# Patient Record
Sex: FEMALE | Race: WHITE | NOT HISPANIC OR LATINO | Employment: OTHER | URBAN - METROPOLITAN AREA
[De-identification: names, ages, dates, MRNs, and addresses within clinical notes are randomized per-mention and may not be internally consistent; named-entity substitution may affect disease eponyms.]

---

## 2020-09-01 ENCOUNTER — TRANSCRIBE ORDERS (OUTPATIENT)
Dept: ADMINISTRATIVE | Facility: HOSPITAL | Age: 75
End: 2020-09-01

## 2020-09-01 ENCOUNTER — LAB (OUTPATIENT)
Dept: LAB | Facility: HOSPITAL | Age: 75
End: 2020-09-01

## 2020-09-01 DIAGNOSIS — N18.9 ACUTE RENAL FAILURE SUPERIMPOSED ON CHRONIC KIDNEY DISEASE, UNSPECIFIED CKD STAGE, UNSPECIFIED ACUTE RENAL FAILURE TYPE (HCC): Primary | ICD-10-CM

## 2020-09-01 DIAGNOSIS — D64.9 ANEMIA, UNSPECIFIED TYPE: ICD-10-CM

## 2020-09-01 DIAGNOSIS — N17.9 ACUTE RENAL FAILURE SUPERIMPOSED ON CHRONIC KIDNEY DISEASE, UNSPECIFIED CKD STAGE, UNSPECIFIED ACUTE RENAL FAILURE TYPE (HCC): ICD-10-CM

## 2020-09-01 DIAGNOSIS — N18.9 ACUTE RENAL FAILURE SUPERIMPOSED ON CHRONIC KIDNEY DISEASE, UNSPECIFIED CKD STAGE, UNSPECIFIED ACUTE RENAL FAILURE TYPE (HCC): ICD-10-CM

## 2020-09-01 DIAGNOSIS — N17.9 ACUTE RENAL FAILURE SUPERIMPOSED ON CHRONIC KIDNEY DISEASE, UNSPECIFIED CKD STAGE, UNSPECIFIED ACUTE RENAL FAILURE TYPE (HCC): Primary | ICD-10-CM

## 2020-09-01 LAB
ALBUMIN SERPL-MCNC: 3.5 G/DL (ref 3.5–5.2)
ALBUMIN/GLOB SERPL: 1.1 G/DL
ALP SERPL-CCNC: 144 U/L (ref 39–117)
ALT SERPL W P-5'-P-CCNC: 14 U/L (ref 1–33)
ANION GAP SERPL CALCULATED.3IONS-SCNC: 11.3 MMOL/L (ref 5–15)
AST SERPL-CCNC: 14 U/L (ref 1–32)
BILIRUB SERPL-MCNC: 0.3 MG/DL (ref 0–1.2)
BUN SERPL-MCNC: 71 MG/DL (ref 8–23)
BUN/CREAT SERPL: 13.2 (ref 7–25)
CALCIUM SPEC-SCNC: 10.5 MG/DL (ref 8.6–10.5)
CHLORIDE SERPL-SCNC: 97 MMOL/L (ref 98–107)
CO2 SERPL-SCNC: 21.7 MMOL/L (ref 22–29)
CREAT SERPL-MCNC: 5.39 MG/DL (ref 0.57–1)
DEPRECATED RDW RBC AUTO: 40.8 FL (ref 37–54)
ERYTHROCYTE [DISTWIDTH] IN BLOOD BY AUTOMATED COUNT: 13.1 % (ref 12.3–15.4)
GFR SERPL CREATININE-BSD FRML MDRD: 8 ML/MIN/1.73
GFR SERPL CREATININE-BSD FRML MDRD: ABNORMAL ML/MIN/{1.73_M2}
GLOBULIN UR ELPH-MCNC: 3.2 GM/DL
GLUCOSE SERPL-MCNC: 195 MG/DL (ref 65–99)
HCT VFR BLD AUTO: 29.5 % (ref 34–46.6)
HGB BLD-MCNC: 9.8 G/DL (ref 12–15.9)
MCH RBC QN AUTO: 28.5 PG (ref 26.6–33)
MCHC RBC AUTO-ENTMCNC: 33.2 G/DL (ref 31.5–35.7)
MCV RBC AUTO: 85.8 FL (ref 79–97)
PLATELET # BLD AUTO: 266 10*3/MM3 (ref 140–450)
PMV BLD AUTO: 10.2 FL (ref 6–12)
POTASSIUM SERPL-SCNC: 4.3 MMOL/L (ref 3.5–5.2)
PROT SERPL-MCNC: 6.7 G/DL (ref 6–8.5)
RBC # BLD AUTO: 3.44 10*6/MM3 (ref 3.77–5.28)
SODIUM SERPL-SCNC: 130 MMOL/L (ref 136–145)
WBC # BLD AUTO: 8.39 10*3/MM3 (ref 3.4–10.8)

## 2020-09-01 PROCEDURE — 80053 COMPREHEN METABOLIC PANEL: CPT

## 2020-09-01 PROCEDURE — 85027 COMPLETE CBC AUTOMATED: CPT

## 2020-09-01 PROCEDURE — 36415 COLL VENOUS BLD VENIPUNCTURE: CPT

## 2023-07-04 PROBLEM — E11.628 DIABETIC FOOT INFECTION: Status: ACTIVE | Noted: 2023-07-04

## 2023-07-04 PROBLEM — I48.0 PAF (PAROXYSMAL ATRIAL FIBRILLATION): Status: ACTIVE | Noted: 2023-07-04

## 2023-07-04 PROBLEM — G62.9 PERIPHERAL NEUROPATHY: Status: ACTIVE | Noted: 2023-07-04

## 2023-07-04 PROBLEM — J44.9 COPD (CHRONIC OBSTRUCTIVE PULMONARY DISEASE): Status: ACTIVE | Noted: 2023-07-04

## 2023-07-04 PROBLEM — Z85.038 HISTORY OF COLON CANCER: Status: ACTIVE | Noted: 2023-07-04

## 2023-07-04 PROBLEM — E11.9 DM (DIABETES MELLITUS): Status: ACTIVE | Noted: 2023-07-04

## 2023-07-04 PROBLEM — I10 HTN (HYPERTENSION): Status: ACTIVE | Noted: 2023-07-04

## 2023-07-04 PROBLEM — L08.9 DIABETIC FOOT INFECTION: Status: ACTIVE | Noted: 2023-07-04

## 2023-07-04 PROBLEM — D64.9 ANEMIA: Status: ACTIVE | Noted: 2023-07-04

## 2023-07-04 PROBLEM — N18.6 ESRD (END STAGE RENAL DISEASE): Status: ACTIVE | Noted: 2023-07-04

## 2023-07-06 PROBLEM — S91.339A PENETRATING FOOT WOUND: Status: ACTIVE | Noted: 2023-07-06

## 2023-07-10 ENCOUNTER — APPOINTMENT (OUTPATIENT)
Dept: CT IMAGING | Facility: HOSPITAL | Age: 78
DRG: 417 | End: 2023-07-10
Payer: MEDICARE

## 2023-07-10 ENCOUNTER — HOSPITAL ENCOUNTER (INPATIENT)
Facility: HOSPITAL | Age: 78
LOS: 8 days | Discharge: NURSING, REHAB OR LONG-TERM CARE | DRG: 417 | End: 2023-07-18
Attending: EMERGENCY MEDICINE | Admitting: INTERNAL MEDICINE
Payer: MEDICARE

## 2023-07-10 DIAGNOSIS — Z99.2 ESRD ON DIALYSIS: ICD-10-CM

## 2023-07-10 DIAGNOSIS — K81.0 ACUTE CHOLECYSTITIS: ICD-10-CM

## 2023-07-10 DIAGNOSIS — R10.11 ABDOMINAL PAIN, ACUTE, RIGHT UPPER QUADRANT: Primary | ICD-10-CM

## 2023-07-10 DIAGNOSIS — N18.6 ESRD ON DIALYSIS: ICD-10-CM

## 2023-07-10 DIAGNOSIS — K80.63 CALCULUS OF GALLBLADDER AND BILE DUCT WITH ACUTE CHOLECYSTITIS, WITH OBSTRUCTION: ICD-10-CM

## 2023-07-10 PROBLEM — I50.32 DIASTOLIC CHF, CHRONIC: Status: ACTIVE | Noted: 2023-07-10

## 2023-07-10 LAB
ALBUMIN SERPL-MCNC: 3.6 G/DL (ref 3.5–5.2)
ALBUMIN/GLOB SERPL: 1.1 G/DL
ALP SERPL-CCNC: 679 U/L (ref 39–117)
ALT SERPL W P-5'-P-CCNC: 132 U/L (ref 1–33)
ANION GAP SERPL CALCULATED.3IONS-SCNC: 13.3 MMOL/L (ref 5–15)
AST SERPL-CCNC: 48 U/L (ref 1–32)
BASOPHILS # BLD AUTO: 0.05 10*3/MM3 (ref 0–0.2)
BASOPHILS NFR BLD AUTO: 0.8 % (ref 0–1.5)
BILIRUB SERPL-MCNC: 1.3 MG/DL (ref 0–1.2)
BUN SERPL-MCNC: 18 MG/DL (ref 8–23)
BUN/CREAT SERPL: 5 (ref 7–25)
CALCIUM SPEC-SCNC: 10 MG/DL (ref 8.6–10.5)
CHLORIDE SERPL-SCNC: 96 MMOL/L (ref 98–107)
CO2 SERPL-SCNC: 29.7 MMOL/L (ref 22–29)
CREAT SERPL-MCNC: 3.61 MG/DL (ref 0.57–1)
D-LACTATE SERPL-SCNC: 1.1 MMOL/L (ref 0.5–2)
DEPRECATED RDW RBC AUTO: 47.5 FL (ref 37–54)
EGFRCR SERPLBLD CKD-EPI 2021: 12.4 ML/MIN/1.73
EOSINOPHIL # BLD AUTO: 0.3 10*3/MM3 (ref 0–0.4)
EOSINOPHIL NFR BLD AUTO: 4.7 % (ref 0.3–6.2)
ERYTHROCYTE [DISTWIDTH] IN BLOOD BY AUTOMATED COUNT: 14.7 % (ref 12.3–15.4)
GEN 5 2HR TROPONIN T REFLEX: 227 NG/L
GLOBULIN UR ELPH-MCNC: 3.2 GM/DL
GLUCOSE BLDC GLUCOMTR-MCNC: 103 MG/DL (ref 70–130)
GLUCOSE BLDC GLUCOMTR-MCNC: 85 MG/DL (ref 70–130)
GLUCOSE SERPL-MCNC: 147 MG/DL (ref 65–99)
HCT VFR BLD AUTO: 35.3 % (ref 34–46.6)
HGB BLD-MCNC: 11.3 G/DL (ref 12–15.9)
IMM GRANULOCYTES # BLD AUTO: 0.04 10*3/MM3 (ref 0–0.05)
IMM GRANULOCYTES NFR BLD AUTO: 0.6 % (ref 0–0.5)
LIPASE SERPL-CCNC: 38 U/L (ref 13–60)
LYMPHOCYTES # BLD AUTO: 0.7 10*3/MM3 (ref 0.7–3.1)
LYMPHOCYTES NFR BLD AUTO: 10.9 % (ref 19.6–45.3)
MCH RBC QN AUTO: 28.8 PG (ref 26.6–33)
MCHC RBC AUTO-ENTMCNC: 32 G/DL (ref 31.5–35.7)
MCV RBC AUTO: 89.8 FL (ref 79–97)
MONOCYTES # BLD AUTO: 0.53 10*3/MM3 (ref 0.1–0.9)
MONOCYTES NFR BLD AUTO: 8.2 % (ref 5–12)
NEUTROPHILS NFR BLD AUTO: 4.81 10*3/MM3 (ref 1.7–7)
NEUTROPHILS NFR BLD AUTO: 74.8 % (ref 42.7–76)
NRBC BLD AUTO-RTO: 0 /100 WBC (ref 0–0.2)
PLATELET # BLD AUTO: 183 10*3/MM3 (ref 140–450)
PMV BLD AUTO: 10.2 FL (ref 6–12)
POTASSIUM SERPL-SCNC: 4.3 MMOL/L (ref 3.5–5.2)
PROT SERPL-MCNC: 6.8 G/DL (ref 6–8.5)
QT INTERVAL: 462 MS
QT INTERVAL: 476 MS
RBC # BLD AUTO: 3.93 10*6/MM3 (ref 3.77–5.28)
SODIUM SERPL-SCNC: 139 MMOL/L (ref 136–145)
TROPONIN T DELTA: 2 NG/L
TROPONIN T SERPL HS-MCNC: 209 NG/L
TROPONIN T SERPL HS-MCNC: 225 NG/L
WBC NRBC COR # BLD: 6.43 10*3/MM3 (ref 3.4–10.8)

## 2023-07-10 PROCEDURE — 93010 ELECTROCARDIOGRAM REPORT: CPT | Performed by: INTERNAL MEDICINE

## 2023-07-10 PROCEDURE — 93005 ELECTROCARDIOGRAM TRACING: CPT | Performed by: EMERGENCY MEDICINE

## 2023-07-10 PROCEDURE — 85025 COMPLETE CBC W/AUTO DIFF WBC: CPT | Performed by: EMERGENCY MEDICINE

## 2023-07-10 PROCEDURE — 80053 COMPREHEN METABOLIC PANEL: CPT | Performed by: EMERGENCY MEDICINE

## 2023-07-10 PROCEDURE — 25010000002 HYDROMORPHONE PER 4 MG: Performed by: EMERGENCY MEDICINE

## 2023-07-10 PROCEDURE — 83690 ASSAY OF LIPASE: CPT | Performed by: EMERGENCY MEDICINE

## 2023-07-10 PROCEDURE — 25010000002 ONDANSETRON PER 1 MG: Performed by: EMERGENCY MEDICINE

## 2023-07-10 PROCEDURE — 36415 COLL VENOUS BLD VENIPUNCTURE: CPT | Performed by: EMERGENCY MEDICINE

## 2023-07-10 PROCEDURE — 84484 ASSAY OF TROPONIN QUANT: CPT | Performed by: EMERGENCY MEDICINE

## 2023-07-10 PROCEDURE — 25010000002 CEFTRIAXONE PER 250 MG: Performed by: EMERGENCY MEDICINE

## 2023-07-10 PROCEDURE — 99221 1ST HOSP IP/OBS SF/LOW 40: CPT | Performed by: SURGERY

## 2023-07-10 PROCEDURE — 84484 ASSAY OF TROPONIN QUANT: CPT | Performed by: INTERNAL MEDICINE

## 2023-07-10 PROCEDURE — 99284 EMERGENCY DEPT VISIT MOD MDM: CPT

## 2023-07-10 PROCEDURE — 82948 REAGENT STRIP/BLOOD GLUCOSE: CPT

## 2023-07-10 PROCEDURE — 87040 BLOOD CULTURE FOR BACTERIA: CPT | Performed by: EMERGENCY MEDICINE

## 2023-07-10 PROCEDURE — 93005 ELECTROCARDIOGRAM TRACING: CPT | Performed by: INTERNAL MEDICINE

## 2023-07-10 PROCEDURE — 83605 ASSAY OF LACTIC ACID: CPT | Performed by: EMERGENCY MEDICINE

## 2023-07-10 PROCEDURE — 74176 CT ABD & PELVIS W/O CONTRAST: CPT

## 2023-07-10 PROCEDURE — 99222 1ST HOSP IP/OBS MODERATE 55: CPT | Performed by: NURSE PRACTITIONER

## 2023-07-10 RX ORDER — HYDROCODONE BITARTRATE AND ACETAMINOPHEN 7.5; 325 MG/1; MG/1
1 TABLET ORAL EVERY 4 HOURS PRN
Status: DISCONTINUED | OUTPATIENT
Start: 2023-07-10 | End: 2023-07-18 | Stop reason: HOSPADM

## 2023-07-10 RX ORDER — PRAVASTATIN SODIUM 40 MG
40 TABLET ORAL NIGHTLY
Status: DISCONTINUED | OUTPATIENT
Start: 2023-07-10 | End: 2023-07-11

## 2023-07-10 RX ORDER — ONDANSETRON 2 MG/ML
4 INJECTION INTRAMUSCULAR; INTRAVENOUS EVERY 6 HOURS PRN
Status: DISCONTINUED | OUTPATIENT
Start: 2023-07-10 | End: 2023-07-18 | Stop reason: HOSPADM

## 2023-07-10 RX ORDER — SODIUM CHLORIDE 0.9 % (FLUSH) 0.9 %
10 SYRINGE (ML) INJECTION EVERY 12 HOURS SCHEDULED
Status: DISCONTINUED | OUTPATIENT
Start: 2023-07-10 | End: 2023-07-18 | Stop reason: HOSPADM

## 2023-07-10 RX ORDER — ONDANSETRON 2 MG/ML
4 INJECTION INTRAMUSCULAR; INTRAVENOUS ONCE
Status: COMPLETED | OUTPATIENT
Start: 2023-07-10 | End: 2023-07-10

## 2023-07-10 RX ORDER — FERROUS SULFATE 325(65) MG
325 TABLET ORAL EVERY OTHER DAY
Status: DISCONTINUED | OUTPATIENT
Start: 2023-07-10 | End: 2023-07-18 | Stop reason: HOSPADM

## 2023-07-10 RX ORDER — NITROGLYCERIN 0.4 MG/1
0.4 TABLET SUBLINGUAL
Status: DISCONTINUED | OUTPATIENT
Start: 2023-07-10 | End: 2023-07-18 | Stop reason: HOSPADM

## 2023-07-10 RX ORDER — BUMETANIDE 1 MG/1
1 TABLET ORAL DAILY
Status: DISCONTINUED | OUTPATIENT
Start: 2023-07-10 | End: 2023-07-13

## 2023-07-10 RX ORDER — HYDROMORPHONE HYDROCHLORIDE 1 MG/ML
0.5 INJECTION, SOLUTION INTRAMUSCULAR; INTRAVENOUS; SUBCUTANEOUS ONCE
Status: COMPLETED | OUTPATIENT
Start: 2023-07-10 | End: 2023-07-10

## 2023-07-10 RX ORDER — SODIUM CHLORIDE 0.9 % (FLUSH) 0.9 %
10 SYRINGE (ML) INJECTION AS NEEDED
Status: DISCONTINUED | OUTPATIENT
Start: 2023-07-10 | End: 2023-07-18 | Stop reason: HOSPADM

## 2023-07-10 RX ORDER — ACETAMINOPHEN 325 MG/1
650 TABLET ORAL EVERY 4 HOURS PRN
Status: DISCONTINUED | OUTPATIENT
Start: 2023-07-10 | End: 2023-07-18 | Stop reason: HOSPADM

## 2023-07-10 RX ORDER — ACETAMINOPHEN 650 MG/1
650 SUPPOSITORY RECTAL EVERY 4 HOURS PRN
Status: DISCONTINUED | OUTPATIENT
Start: 2023-07-10 | End: 2023-07-18 | Stop reason: HOSPADM

## 2023-07-10 RX ORDER — ALBUTEROL SULFATE 90 UG/1
2 AEROSOL, METERED RESPIRATORY (INHALATION) EVERY 4 HOURS PRN
COMMUNITY
End: 2023-07-18 | Stop reason: HOSPADM

## 2023-07-10 RX ORDER — SODIUM CHLORIDE 9 MG/ML
40 INJECTION, SOLUTION INTRAVENOUS AS NEEDED
Status: DISCONTINUED | OUTPATIENT
Start: 2023-07-10 | End: 2023-07-18 | Stop reason: HOSPADM

## 2023-07-10 RX ORDER — SENNOSIDES A AND B 8.6 MG/1
1 TABLET, FILM COATED ORAL DAILY
Status: DISCONTINUED | OUTPATIENT
Start: 2023-07-10 | End: 2023-07-10 | Stop reason: SDUPTHER

## 2023-07-10 RX ORDER — BISACODYL 5 MG/1
5 TABLET, DELAYED RELEASE ORAL DAILY PRN
Status: DISCONTINUED | OUTPATIENT
Start: 2023-07-10 | End: 2023-07-18 | Stop reason: HOSPADM

## 2023-07-10 RX ORDER — CARVEDILOL 6.25 MG/1
6.25 TABLET ORAL 2 TIMES DAILY WITH MEALS
Status: DISCONTINUED | OUTPATIENT
Start: 2023-07-10 | End: 2023-07-12

## 2023-07-10 RX ORDER — HYDROMORPHONE HYDROCHLORIDE 1 MG/ML
0.5 INJECTION, SOLUTION INTRAMUSCULAR; INTRAVENOUS; SUBCUTANEOUS
Status: DISCONTINUED | OUTPATIENT
Start: 2023-07-10 | End: 2023-07-13

## 2023-07-10 RX ORDER — POLYETHYLENE GLYCOL 3350 17 G/17G
17 POWDER, FOR SOLUTION ORAL DAILY PRN
Status: DISCONTINUED | OUTPATIENT
Start: 2023-07-10 | End: 2023-07-18 | Stop reason: HOSPADM

## 2023-07-10 RX ORDER — AMOXICILLIN 250 MG
2 CAPSULE ORAL 2 TIMES DAILY
Status: DISCONTINUED | OUTPATIENT
Start: 2023-07-10 | End: 2023-07-18 | Stop reason: HOSPADM

## 2023-07-10 RX ORDER — MELATONIN
2000 DAILY
Status: DISCONTINUED | OUTPATIENT
Start: 2023-07-10 | End: 2023-07-18 | Stop reason: HOSPADM

## 2023-07-10 RX ORDER — ACETAMINOPHEN 160 MG
2000 TABLET,DISINTEGRATING ORAL DAILY
Status: DISCONTINUED | OUTPATIENT
Start: 2023-07-10 | End: 2023-07-10

## 2023-07-10 RX ORDER — BISACODYL 10 MG
10 SUPPOSITORY, RECTAL RECTAL DAILY PRN
Status: DISCONTINUED | OUTPATIENT
Start: 2023-07-10 | End: 2023-07-18 | Stop reason: HOSPADM

## 2023-07-10 RX ORDER — ACETAMINOPHEN 160 MG/5ML
650 SOLUTION ORAL EVERY 4 HOURS PRN
Status: DISCONTINUED | OUTPATIENT
Start: 2023-07-10 | End: 2023-07-18 | Stop reason: HOSPADM

## 2023-07-10 RX ADMIN — Medication 1 APPLICATION: at 17:47

## 2023-07-10 RX ADMIN — PRAVASTATIN SODIUM 40 MG: 40 TABLET ORAL at 21:08

## 2023-07-10 RX ADMIN — BUMETANIDE 1 MG: 1 TABLET ORAL at 17:48

## 2023-07-10 RX ADMIN — Medication 2000 UNITS: at 17:47

## 2023-07-10 RX ADMIN — ONDANSETRON 4 MG: 2 INJECTION INTRAMUSCULAR; INTRAVENOUS at 10:50

## 2023-07-10 RX ADMIN — HYDROMORPHONE HYDROCHLORIDE 0.5 MG: 1 INJECTION, SOLUTION INTRAMUSCULAR; INTRAVENOUS; SUBCUTANEOUS at 10:50

## 2023-07-10 RX ADMIN — FERROUS SULFATE TAB 325 MG (65 MG ELEMENTAL FE) 325 MG: 325 (65 FE) TAB at 17:47

## 2023-07-10 RX ADMIN — CARVEDILOL 6.25 MG: 6.25 TABLET, FILM COATED ORAL at 17:47

## 2023-07-10 RX ADMIN — CEFTRIAXONE SODIUM 1 G: 1 INJECTION, POWDER, FOR SOLUTION INTRAMUSCULAR; INTRAVENOUS at 14:04

## 2023-07-10 RX ADMIN — Medication 10 ML: at 21:08

## 2023-07-10 NOTE — ED NOTES
"Nursing report ED to floor  Kalyn Mejia  78 y.o.  female    HPI :   Chief Complaint   Patient presents with    Abdominal Pain       Admitting doctor:   James Kowalski MD    Admitting diagnosis:   The primary encounter diagnosis was Abdominal pain, acute, right upper quadrant. Diagnoses of Calculus of gallbladder and bile duct with acute cholecystitis, with obstruction and ESRD on dialysis were also pertinent to this visit.    Code status:   Current Code Status       Date Active Code Status Order ID Comments User Context       Prior            Allergies:   Ativan [lorazepam], Sulfa antibiotics, and Vancomycin    Isolation:   No active isolations    Intake and Output  No intake or output data in the 24 hours ending 07/10/23 1301    Weight:       07/10/23  1007   Weight: 55.3 kg (122 lb)       Most recent vitals:   Vitals:    07/10/23 1007 07/10/23 1131   BP: 108/70 (!) 185/85   Pulse: 69 75   Resp: 16 16   Temp: 97.8 °F (36.6 °C)    TempSrc: Tympanic    SpO2: 96% 98%   Weight: 55.3 kg (122 lb)    Height: 157.5 cm (62\")        Active LDAs/IV Access:   Lines, Drains & Airways       Active LDAs       Name Placement date Placement time Site Days    Peripheral IV 07/10/23 1045 Left Antecubital 07/10/23  1045  Antecubital  less than 1                    Labs (abnormal labs have a star):   Labs Reviewed   COMPREHENSIVE METABOLIC PANEL - Abnormal; Notable for the following components:       Result Value    Glucose 147 (*)     Creatinine 3.61 (*)     Chloride 96 (*)     CO2 29.7 (*)     ALT (SGPT) 132 (*)     AST (SGOT) 48 (*)     Alkaline Phosphatase 679 (*)     Total Bilirubin 1.3 (*)     BUN/Creatinine Ratio 5.0 (*)     eGFR 12.4 (*)     All other components within normal limits    Narrative:     GFR Normal >60  Chronic Kidney Disease <60  Kidney Failure <15    The GFR formula is only valid for adults with stable renal function between ages 18 and 70.   CBC WITH AUTO DIFFERENTIAL - Abnormal; Notable for the " following components:    Hemoglobin 11.3 (*)     Lymphocyte % 10.9 (*)     Immature Grans % 0.6 (*)     All other components within normal limits   TROPONIN - Abnormal; Notable for the following components:    HS Troponin T 225 (*)     All other components within normal limits    Narrative:     High Sensitive Troponin T Reference Range:  <10.0 ng/L- Negative Female for AMI  <15.0 ng/L- Negative Male for AMI  >=10 - Abnormal Female indicating possible myocardial injury.  >=15 - Abnormal Male indicating possible myocardial injury.   Clinicians would have to utilize clinical acumen, EKG, Troponin, and serial changes to determine if it is an Acute Myocardial Infarction or myocardial injury due to an underlying chronic condition.        LIPASE - Normal   BLOOD CULTURE   BLOOD CULTURE   HIGH SENSITIVITIY TROPONIN T 2HR   LACTIC ACID, PLASMA   CBC AND DIFFERENTIAL    Narrative:     The following orders were created for panel order CBC & Differential.  Procedure                               Abnormality         Status                     ---------                               -----------         ------                     CBC Auto Differential[[id number]]        Abnormal            Final result                 Please view results for these tests on the individual orders.       EKG:   ECG 12 Lead QT Measurement   Preliminary Result   HEART RATE= 74  bpm   RR Interval= 811  ms   CA Interval= 156  ms   P Horizontal Axis= -3  deg   P Front Axis= 86  deg   QRSD Interval= 103  ms   QT Interval= 476  ms   QRS Axis= 266  deg   T Wave Axis= -61  deg   - ABNORMAL ECG -   Sinus rhythm   Right superior axis   Consider RVH w/ secondary repol abnormality   Prolonged QT interval   Electronically Signed By:    Date and Time of Study: 2023-07-10 10:38:45          Meds given in ED:   Medications   sodium chloride 0.9 % flush 10 mL (has no administration in time range)   cefTRIAXone (ROCEPHIN) 1 g in sodium chloride 0.9 % 100 mL IVPB-VTB (has  no administration in time range)   ondansetron (ZOFRAN) injection 4 mg (4 mg Intravenous Given 7/10/23 1050)   HYDROmorphone (DILAUDID) injection 0.5 mg (0.5 mg Intravenous Given 7/10/23 1050)       Imaging results:  CT Abdomen Pelvis Without Contrast    Result Date: 7/10/2023  Limited in the absence of contrast and extensive artifact from patient's bilateral hip hardware. There is distention of the gallbladder with wall thickening and containing calculi. There is mild intrahepatic and proximal common bile duct dilatation with suggestion of choledocholithiasis. Correlation with serum bilirubin alkaline phosphatase and further evaluation with MRCP as indicated. 2. CT findings of gastritis. 3. Constipation.  These findings were discussed with Dr. Corona by telephone at the time of dictation.  Radiation dose reduction techniques were utilized, including automated exposure control and exposure modulation based on body size.        Ambulatory status:   Wheelchair required     Social issues:   Social History     Socioeconomic History    Marital status:    Tobacco Use    Smoking status: Former     Types: Cigarettes    Smokeless tobacco: Former   Vaping Use    Vaping Use: Never used   Substance and Sexual Activity    Drug use: Never       NIH Stroke Scale:       Stefanie Renenr RN  07/10/23 13:01 EDT

## 2023-07-10 NOTE — CONSULTS
Cc: Abdominal pain, concern for cholecystitis    History of presenting illness:   This is a nice and chronically ill 78-year-old female with a history of AFib (on eliquis), CRF on HD, stroke and DVT who presents with upper abdominal pain which began while on dialysis today. There was no radiation, but there was nausea and heaving.  Since getting dilaudid in the ER her pain is much improved.  CT obtained in the ER suggested acute cholecystitis.  She did not complete dialysis today.    Past Medical History: colon cancer, CHF, DM2, gastroparesis, DVT, GERD, HTN, CRF, stroke, spinal stenosis     Past Surgical History: Left foot incision and drainage, history of rectosigmoid resection for what sounds like a cancerous polyp done approximately 20 years ago.  She is also had an appendectomy and hysterectomy..  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    Medications: Reviewed and reconciled in epic.  Noted to include Eliquis most recently taken last night    Allergies: Ativan, sulfa drugs, vancomycin    Social History: Former smoker who quit in 1986.  She is severely debilitated, essentially unable to get out of a wheelchair.    Family History: Significant for multiple family members with gallbladder disease, negative for known colon cancer    Review of Systems:  Constitutional: Positive for decreased appetite, denies fever or chills  Neck: no swollen glands or dysphagia or odynophagia  Respiratory: negative for SOB, cough, hemoptysis or wheezing  Cardiovascular: negative for chest pain, palpitations or peripheral edema  Gastrointestinal: Positive for abdominal pain, nausea, heaving      Physical Exam:  BMI: 22.3  Temperature 97.8 heart rate 69 blood pressure 132/61 oxygenation 91% on 2 L nasal cannula  General: alert and oriented, appropriate, no acute distress  Eyes: No scleral icterus, extraocular movements are intact  Neck: Supple without lymphadenopathy or thyromegaly, trachea is in the  midline  Respiratory: There is good bilateral chest expansion, no use of accessory muscles is noted  Cardiovascular: No jugular venous distention or peripheral edema is seen  Gastrointestinal: Soft with mild tenderness in the epigastrium, no mass or hernia felt, well-healed surgical scars in the lower abdomen are noted    Laboratory data: White blood cell count of 6.4, hemoglobin 11.3 creatinine 3.6 alkaline phosphatase 679 total bilirubin 1.3 AST 48  lactate 1.1    Imaging data: CT images reviewed by me.  Lack of contrast makes interpretation challenging, but the wall of the gallbladder does appear to be distended and there is some thickening and obvious stones noted.  Difficult for me to assess for biliary ductal dilatation but it is read as such.  I cannot clearly make out any stones in the bile ducts.      Assessment and plan:   -Upper abdominal pain in the setting of elevated liver function studies and stones in a pattern consistent with that of acute cholecystitis, difficult to rule out choledocholithiasis.  -Multiple other medical illnesses including renal failure on hemodialysis, diabetes, stroke, atrial fibrillation (on Eliquis)  -I think she is going to require cholecystectomy during this admission.  I think the question is whether or not there is evidence of choledocholithiasis.  At the moment I do not see any signs of cholangitis.  I recommend we follow her total bilirubin and if it goes up from the 1.3 is currently at, GI consultation and possible MRCP could be beneficial.  -Continue to hold Eliquis for now  -Multiple prior surgeries certainly increase the risk the patient would need open cholecystectomy, though I think it is still unlikely  -Obviously a very high risk patient given her multiple illnesses and her severe debilitation    Risks associated with the procedure are noted to include, but not be limited to, bleeding, infection, injury to intra-abdominal structures including the liver,  small and large intestine, stomach, duodenum, common bile duct and major vascular structures.  Possible need for conversion to open surgery, further revisional surgery, postoperative ERCP discussed.      Rolando Ivey MD, FACS  General, Minimally Invasive and Endoscopic Surgery  [facility], [place], operated by [facility]    [address], [address]  [place], [place], [address]  P: [phone]  F: [phone]

## 2023-07-10 NOTE — ED NOTES
Pt was at dialysis and started having abd pain.  She didn't get her full treatment r/t abd pain.  It circles around her trunk.  She has nausea

## 2023-07-10 NOTE — ED NOTES
PT reports she is having abd pain while at dialysis. PT reports she is a MWF, has not missed a treatment but was unable to finish her full treatment today because of pain and SOA during her treatment. Pt reports she was seen here last week as well. Pt is A&OX4 and restricted on the R arm.    Bulmaro Laboy RN

## 2023-07-10 NOTE — ED NOTES
Received OK from MD Corona to start antibiotics w/out 2nd set of blood cultures d/t difficult stick, limb restriction.

## 2023-07-10 NOTE — CONSULTS
Patient Name: Kalyn Mejia  :1945  78 y.o.    Date of Admission: 7/10/2023  Date of Consultation:  07/10/23  Encounter Provider: CINDY Morales  Place of Service: [facility]  Referring Provider: James Kowalski MD  Patient Care Team:  Provider, No Known as PCP - General  Edward Mcdonnell MD (Inactive) as PCP - Family Medicine      Chief complaint: abdominal pain    History of Present Illness: Ms. Mejia is a 78 year old woman who lives in [place]. She is a brittle type I diabetes and has end stage renal disease on hemodialysis. She has a history of diastolic heart failure and mitral valve repair. She has paroxysmal atrial fibrillation and is anticoagulated with apixaban. She has hypertension, previous stroke, hyperlipidemia and prior DVT. Her primary cardiologist is Dr. [facility].     She had a a stress test earlier this year that had a lot of artifact and was not interpretable. She went on to have cardiac catheterization that showed moderate disease of the distal LAD but was otherwise unremarkable. She had an echo that showed preserved LV function. Mitral valve repair appeared stable.     She is visiting [place].     She was admitted last week with foot pain found to have very large callus. She underwent excisional debridement . Appears to be initiated by foreign body. She was discharged home after an uneventful hospitalization.     She was in dialysis this morning. She had a sudden onset of severe abdominal pain. Dialysis was stopped early and she was taking to the emergency room.     Imaging showed possible cholelithiasis and fluid around the gallbladder suggestive of acute cholecystitis. AC held. General surgery consulted. Normal white count ,  increased liver function tests noted.    She has not had any chest pain or pressure. No shortness of breath, PND, orthopnea. No syncope or near syncope.       Cardiac cath 23  1.  Hemodynamics    A. Right heart catheterization     1. RA: mean 12 mmHg     2. RV: 65/4     3. PA: 70/22, mean 41 mmHg, TPG 26, PVR 7     4: PCWP: mean 15 mmHg     5: Saturations: Arterial 93.9%, PA 56.2%     6: Irma CO/CI: 3.7 L/min, 2.4    B. Opening arterial pressure: 116/45    C. LVEDP: 5    D. Aortic valve: has no significant disease     2. Coronary anatomy    A. Left Main artery: The left main artery bifurcates into the left anterior descending artery and left circumflex artery. The left main artery is patent      B. Left anterior descending artery: Transapical vessel which gives rise to 2 diagonal arteries. The left anterior descending artery has mild luminal irregularities proximal and mid, with a focal 20-30% distal stenosis. The diagonal arteries have no significant disease      C. Left circumflex artery: small and non-dominant and gives rise to 2 obtuse marginal arteries. The left circumflex has no significant disease. The obtuse marginal arteries have no significant disease      D. Right coronary artery: dominant and gives rise to the posterior descending artery and posterolateral branch. The right coronary artery has no significant disease. The posterior descending artery has no significant disease. The PLB has moderate distal disease     No Intervention performed     Complications: none       Impression:   1. No significant CAD - non obstructive disease in distal LAD, distal PLB   2. Moderate pulmonary HTN, primarily pre-capillary (TPG = 26, PVR = 7)   3. Mildly elevated left and right heart filling pressures   4. Low normal cardiac output/index   5. No significant aortic valve gradient     Echo 2/3/23  STUDY CONCLUSIONS:   SUMMARY:   - Left ventricle: The cavity size was normal. Wall thickness was normal.     Global systolic function is normal. The estimated ejection fraction is 65%.     Diastolic function assessment consistent with increased left atrial     pressure.   - Aortic valve: No significant  regurgitation.   - Mitral valve: No significant regurgitation. Posterior MAC. MV repair. The     mean diastolic gradient is 9mm Hg, consistent with atleast moderate MS     (prior mean gradient 8mmHg).   - Left atrium: The atrium is dilated.   - Right ventricle: The cavity size is normal. Systolic function is normal.   - Right atrium: The atrium was dilated.   - Tricuspid valve: Moderate regurgitation.   - Pulmonary arteries: The peak systolic pressure is 65mm Hg.   - Pericardium: There is no pericardial effusion.   Past Medical History:   Diagnosis Date    Cancer     CHF (congestive heart failure)     Diabetes mellitus     DVT (deep venous thrombosis)     Gastroparesis     GERD (gastroesophageal reflux disease)     Hyperlipidemia     Hypertension     Kidney transplant failure     Neuropathy     Osteoporosis     Pulmonary nodules     Renal failure     Rheumatoid arthritis     Spinal stenosis     Stroke        Past Surgical History:   Procedure Laterality Date    INCISION AND DRAINAGE LEG Left 7/5/2023    Procedure: LEFT INCISION AND DRAINAGE FOOT;  Surgeon: Justice Rosario MD;  Location: [facility];  Service: Vascular;  Laterality: Left;         Prior to Admission medications    Medication Sig Start Date End Date Taking? Authorizing Provider   albuterol sulfate  (90 Base) MCG/ACT inhaler Inhale 2 puffs Every 4 (Four) Hours As Needed for Wheezing.   Yes Saturnino Barrow MD   apixaban (ELIQUIS) 2.5 MG tablet tablet Take 1 tablet by mouth Daily. Resume on Monday.  Patient taking differently: Take 1 tablet by mouth Every Night. Resume on Monday. 7/6/23  Yes Gt Valladares MD   Biotin 17474 MCG tablet Take 1 tablet by mouth Daily.   Yes Saturnino Barrow MD   bumetanide (BUMEX) 1 MG tablet Take 1 tablet by mouth Daily.   Yes Saturnino Barrow MD   carvedilol (COREG) 6.25 MG tablet Take 1 tablet by mouth 2 (Two) Times a Day With Meals.   Yes Saturnino Barrow MD   cetirizine (zyrTEC) 10  MG tablet Take 1 tablet by mouth Daily.   Yes Saturnino Barrow MD   Cholecalciferol (Vitamin D3) 50 MCG (2000 UT) capsule Take 1 capsule by mouth Daily.   Yes Saturnino Barrow MD   ferrous sulfate 325 (65 FE) MG tablet Take 1 tablet by mouth Every Other Day. 7/6/23  Yes Gt Valladares MD   Insulin Aspart (novoLOG) 100 UNIT/ML injection Inject  under the skin into the appropriate area as directed 3 (Three) Times a Day Before Meals. 1 unit for every 50 above 150   Yes Saturnino Barrow MD   insulin glargine (LANTUS, SEMGLEE) 100 UNIT/ML injection Inject 11 Units under the skin into the appropriate area as directed Daily.   Yes Saturnino Barrow MD   Multiple Vitamins-Minerals (b complex-C-E-zinc) tablet Take 1 tablet by mouth Daily.   Yes Saturnino Barrow MD   omeprazole (PriLOSEC) 40 MG capsule Take 1 capsule by mouth daily. NEEDS APPT FOR FURTHER REFILLS  Patient taking differently: Take 20 mg by mouth Daily. NEEDS APPT FOR FURTHER REFILLS 8/10/16  Yes Edward Mcdonnell MD   Patiromer Sorbitex Calcium (VELTASSA PO) Take 8.4 g by mouth Every Other Day.   Yes Saturnino Barrow MD   pravastatin (PRAVACHOL) 40 MG tablet Take 1 tablet by mouth daily.  Patient taking differently: Take 1 tablet by mouth Every Night. 7/26/16  Yes Federica Escalante MD   senna (SENOKOT) 8.6 MG tablet Take 1 tablet by mouth daily. 3/18/16  Yes Edward Mcdonnell MD   insulin glargine (LANTUS, SEMGLEE) 100 UNIT/ML injection Inject 10 Units under the skin into the appropriate area as directed Every Night.    ProviderSaturnino MD       Allergies   Allergen Reactions    Ativan [Lorazepam] Unknown - High Severity    Sulfa Antibiotics Nausea And Vomiting    Vancomycin Itching       Social History     Socioeconomic History    Marital status:    Tobacco Use    Smoking status: Former     Packs/day: 1.50     Years: 20.00     Pack years: 30.00     Types: Cigarettes     Quit date: 1986     Years since  quittin.5    Smokeless tobacco: Former   Vaping Use    Vaping Use: Never used   Substance and Sexual Activity    Alcohol use: Yes     Alcohol/week: 1.0 standard drink     Types: 1 Shots of liquor per week     Comment: 1 conor every other week    Drug use: Never       Family History   Problem Relation Age of Onset    Malig Hyperthermia Neg Hx        REVIEW OF SYSTEMS:   All systems reviewed.  Pertinent positives identified in HPI.  All other systems are negative.      Objective:     Vitals:    07/10/23 1450 07/10/23 1547 07/10/23 1607 07/10/23 1609   BP: 142/61      BP Location: Left arm      Patient Position: Lying      Pulse: 69      Resp: 16      Temp:       TempSrc:       SpO2:  93% (!) 82% 91%   Weight:       Height:         Body mass index is 22.31 kg/m².    Physical Exam:  Constitutional: She is oriented to person, place, and time. She appears well-developed. She does not appear ill.   HENT:   Head: Normocephalic and atraumatic. Head is without contusion.   Right Ear: Hearing normal. No drainage.   Left Ear: Hearing normal. No drainage.   Nose: No nasal deformity. No epistaxis.   Eyes: Lids are normal. Right eye exhibits no exudate. Left eye exhibits no exudate.  Neck: No JVD present. Carotid bruit is not present. No tracheal deviation present. No thyroid mass and no thyromegaly present.   Cardiovascular: Normal rate, regular rhythm and normal heart sounds.    Pulses:       Posterior tibial pulses are 2+ on the right side, and 2+ on the left side.   Pulmonary/Chest: Effort normal and breath sounds normal.   Abdominal: Soft. Normal appearance and bowel sounds are normal. There is no tenderness.   Musculoskeletal: Normal range of motion.        Right shoulder: She exhibits no deformity.        Left shoulder: She exhibits no deformity.   Neurological: She is alert and oriented to person, place, and time. She has normal strength.   Skin: Skin is warm, dry and intact. No rash noted.   Psychiatric: She has  a normal mood and affect. Her behavior is normal. Thought content normal.   Vitals reviewed      Lab Review:     Results from last 7 days   Lab Units 07/10/23  1043   SODIUM mmol/L 139   POTASSIUM mmol/L 4.3   CHLORIDE mmol/L 96*   CO2 mmol/L 29.7*   BUN mg/dL 18   CREATININE mg/dL 3.61*   CALCIUM mg/dL 10.0   BILIRUBIN mg/dL 1.3*   ALK PHOS U/L 679*   ALT (SGPT) U/L 132*   AST (SGOT) U/L 48*   GLUCOSE mg/dL 147*     Results from last 7 days   Lab Units 07/10/23  1535 07/10/23  1351 07/10/23  1044   HSTROP T ng/L 209* 227* 225*     Results from last 7 days   Lab Units 07/10/23  1043   WBC 10*3/mm3 6.43   HEMOGLOBIN g/dL 11.3*   HEMATOCRIT % 35.3   PLATELETS 10*3/mm3 183               Current Facility-Administered Medications:     acetaminophen (TYLENOL) tablet 650 mg, 650 mg, Oral, Q4H PRN **OR** acetaminophen (TYLENOL) 160 MG/5ML solution 650 mg, 650 mg, Oral, Q4H PRN **OR** acetaminophen (TYLENOL) suppository 650 mg, 650 mg, Rectal, Q4H PRN, Judy Deng APRN    sennosides-docusate (PERICOLACE) 8.6-50 MG per tablet 2 tablet, 2 tablet, Oral, BID **AND** polyethylene glycol (MIRALAX) packet 17 g, 17 g, Oral, Daily PRN **AND** bisacodyl (DULCOLAX) EC tablet 5 mg, 5 mg, Oral, Daily PRN **AND** bisacodyl (DULCOLAX) suppository 10 mg, 10 mg, Rectal, Daily PRN, Judy Deng APRN    bumetanide (BUMEX) tablet 1 mg, 1 mg, Oral, Daily, Judy Deng APRN    cadexomer iodine (IODOSORB) 0.9 % gel 1 application , 1 application , Topical, Daily, James Kowalski MD    carvedilol (COREG) tablet 6.25 mg, 6.25 mg, Oral, BID With Meals, Judy Deng APRN    cholecalciferol (VITAMIN D3) tablet 2,000 Units, 2,000 Units, Oral, Daily, James Kowalski MD    ferrous sulfate tablet 325 mg, 325 mg, Oral, Every Other Day, Judy Deng APRN    nitroglycerin (NITROSTAT) SL tablet 0.4 mg, 0.4 mg, Sublingual, Q5 Min PRN, James Kowalski MD    ondansetron (ZOFRAN) injection 4 mg, 4 mg,  Intravenous, Q6H PRN, Judy Deng APRN    pravastatin (PRAVACHOL) tablet 40 mg, 40 mg, Oral, Nightly, Judy Deng APRN    [COMPLETED] Insert Peripheral IV, , , Once **AND** sodium chloride 0.9 % flush 10 mL, 10 mL, Intravenous, PRN, Silvestre Corona MD    sodium chloride 0.9 % flush 10 mL, 10 mL, Intravenous, Q12H, Judy Deng APRN    sodium chloride 0.9 % flush 10 mL, 10 mL, Intravenous, PRN, Judy Deng APRN    sodium chloride 0.9 % infusion 40 mL, 40 mL, Intravenous, PRN, Judy Deng APRN    Assessment and Plan:       Active Hospital Problems    Diagnosis  POA    **Acute cholecystitis [K81.0]  Yes    Diastolic CHF, chronic [I50.32]  Yes    Anemia [D64.9]  Yes    COPD (chronic obstructive pulmonary disease) [J44.9]  Yes    DM (diabetes mellitus) [E11.9]  Yes    ESRD (end stage renal disease) [N18.6]  Yes    History of colon cancer [Z85.038]  Yes    HTN (hypertension) [I10]  Yes    PAF (paroxysmal atrial fibrillation) [I48.0]  Yes    Peripheral neuropathy [G62.9]  Yes      Resolved Hospital Problems   No resolved problems to display.     1. Acute cholecystitis  2. Paroxysmal atrial fibrillation - in SR , apixaban held.   3. Mitral valve regurgitation status post mitral valve repair  4. Chronic diastolic congestive heart failure  5. Coronary artery disease, cardiac catheterization in February 2023 with moderate distal LAD disease and otherwise unremarkable.   6. Hypertension  7. Diabetes mellitus type I  8. End stage renal disease on HD  9. Recent debridement of large callus due to foreign body    Ms. Mejia is a 78 year old women with insulin dependent diabetes mellitus type I, end stage renal disease on HD, chronic diastolic heart failure, PAF, and mitral valve repair.     By definition she is increased risk due to diabetes and renal disease, however this is not modifiable. No additional cardiac testing needed at this time. She had recent cardiac testing that was  stable. No evidence of decompensated heart failure or acute coronary syndrome at this time. HS troponin elevated most certainly due to kidney disease. Flat trend noted.     Volume status managed by HD and nephrology.     Acceptable risk for surgery.     Will follow.     Quyen Jeffery, CINDY  07/10/23  16:57 EDT

## 2023-07-10 NOTE — NURSING NOTE
Cwon consult received. Patient just recently here and had an excisional debridement of a callus to the left plantar foot.  This was performed on 7/5/23. She reports using Silvasorb gel with daily dressing changes. Today the wound appears stable with moderate amt of thin serous drainage. The wound edges are macerated and I feel the current wound care and making the wound too moist.  I will recommend starting Iodosorb gel with daily dressing changes and have placed orders for this wound care in [facility].  I discussed with patient and daughter and they are agreeable. I have also discussed with primary RN.  Please re-consult with any questions or needs.

## 2023-07-10 NOTE — ED PROVIDER NOTES
EMERGENCY DEPARTMENT ENCOUNTER    Room Number:  20/20  PCP: Provider, No Known  Patient Care Team:  Provider, No Known as PCP - Edward Lundberg MD (Inactive) as PCP - Family Medicine   Independent Historians: Patient, EMS    HPI:  Chief Complaint: Acute abdominal pain    A complete HPI/ROS/PMH/PSH/SH/FH are unobtainable due to: Nothing    Chronic or social conditions impacting patient care (Social Determinants of Health): None  (Financial Resource Strain / Food Insecurity / Transportation Needs / Physical Activity / Stress / Social Connections / Intimate Partner Violence / Housing Stability)    Context: Kalyn Mejia is a 78 y.o. female who presents to the ED c/o acute abdominal pain.  She reports at 730 this morning she developed upper abdominal pain in the center of her abdomen that radiated bilaterally.  She reports that it is associated with nausea.  She states that she had about an hour and 45 minutes of dialysis this morning before she had to stop due to the pain.  She reports that the pain started before dialysis.  She states that she typically gets 3-1/2-hour treatment of dialysis.  She states she has never had similar abdomen pain before.  She has not had any treatment for her symptoms.  She reports she is from [place].  She recently came here to visit and has already been admitted to the hospital 1 time.  She denies any diarrhea.  She denies any chest pain.  She does report some shortness of breath.  She states she has had a history of an appendectomy in the past but has not had a cholecystectomy.  She states the pain is sharp.  She states that it originated in her left upper quadrant.    Review of prior external notes (non-ED) -and- Review of prior external test results outside of this encounter: Discharge summary dated 7/6/2023 with a left plantar wound and a large callus.  She had excisional debridement.    Prescription drug monitoring program review:         PAST MEDICAL HISTORY  Active  Ambulatory Problems     Diagnosis Date Noted    Diabetic foot infection 07/04/2023    HTN (hypertension) 07/04/2023    ESRD (end stage renal disease) 07/04/2023    Peripheral neuropathy 07/04/2023    DM (diabetes mellitus) 07/04/2023    Anemia 07/04/2023    COPD (chronic obstructive pulmonary disease) 07/04/2023    PAF (paroxysmal atrial fibrillation) 07/04/2023    History of colon cancer 07/04/2023    Penetrating foot wound 07/06/2023     Resolved Ambulatory Problems     Diagnosis Date Noted    No Resolved Ambulatory Problems     Past Medical History:   Diagnosis Date    Cancer     CHF (congestive heart failure)     Diabetes mellitus     DVT (deep venous thrombosis)     Gastroparesis     GERD (gastroesophageal reflux disease)     Kidney transplant failure     Neuropathy     Osteoporosis     Pulmonary nodules     Renal failure     Rheumatoid arthritis     Spinal stenosis          PAST SURGICAL HISTORY  Past Surgical History:   Procedure Laterality Date    INCISION AND DRAINAGE LEG Left 7/5/2023    Procedure: LEFT INCISION AND DRAINAGE FOOT;  Surgeon: Justice Rosario MD;  Location: [facility];  Service: Vascular;  Laterality: Left;         FAMILY HISTORY  Family History   Problem Relation Age of Onset    Malig Hyperthermia Neg Hx          SOCIAL HISTORY  Social History     Socioeconomic History    Marital status:    Tobacco Use    Smoking status: Former     Types: Cigarettes    Smokeless tobacco: Former   Vaping Use    Vaping Use: Never used   Substance and Sexual Activity    Drug use: Never         ALLERGIES  Ativan [lorazepam], Sulfa antibiotics, and Vancomycin        REVIEW OF SYSTEMS  Review of Systems  Included in HPI  All systems reviewed and negative except for those discussed in HPI.      PHYSICAL EXAM    I have reviewed the triage vital signs and nursing notes.    ED Triage Vitals [07/10/23 1007]   Temp Heart Rate Resp BP SpO2   97.8 °F (36.6 °C) 69 16 108/70 96 %      Temp src Heart Rate  Source Patient Position BP Location FiO2 (%)   Tympanic Monitor -- -- --       Physical Exam  GENERAL: Awake, alert, no acute distress, appears uncomfortable  SKIN: Warm, dry  HENT: Normocephalic, atraumatic  EYES: no scleral icterus  CV: regular rhythm, regular rate  RESPIRATORY: normal effort, lungs clear  ABDOMEN: soft, moderate tenderness throughout her upper abdomen in the midline and bilaterally, nondistended.  No lower abdominal tenderness  MUSCULOSKELETAL: no deformity  NEURO: alert, moves all extremities, follows commands                                                               LAB RESULTS  Recent Results (from the past 24 hour(s))   ECG 12 Lead QT Measurement    Collection Time: 07/10/23 10:38 AM   Result Value Ref Range    QT Interval 476 ms   Comprehensive Metabolic Panel    Collection Time: 07/10/23 10:43 AM    Specimen: Blood   Result Value Ref Range    Glucose 147 (H) 65 - 99 mg/dL    BUN 18 8 - 23 mg/dL    Creatinine 3.61 (H) 0.57 - 1.00 mg/dL    Sodium 139 136 - 145 mmol/L    Potassium 4.3 3.5 - 5.2 mmol/L    Chloride 96 (L) 98 - 107 mmol/L    CO2 29.7 (H) 22.0 - 29.0 mmol/L    Calcium 10.0 8.6 - 10.5 mg/dL    Total Protein 6.8 6.0 - 8.5 g/dL    Albumin 3.6 3.5 - 5.2 g/dL    ALT (SGPT) 132 (H) 1 - 33 U/L    AST (SGOT) 48 (H) 1 - 32 U/L    Alkaline Phosphatase 679 (H) 39 - 117 U/L    Total Bilirubin 1.3 (H) 0.0 - 1.2 mg/dL    Globulin 3.2 gm/dL    A/G Ratio 1.1 g/dL    BUN/Creatinine Ratio 5.0 (L) 7.0 - 25.0    Anion Gap 13.3 5.0 - 15.0 mmol/L    eGFR 12.4 (L) >60.0 mL/min/1.73   Lipase    Collection Time: 07/10/23 10:43 AM    Specimen: Blood   Result Value Ref Range    Lipase 38 13 - 60 U/L   CBC Auto Differential    Collection Time: 07/10/23 10:43 AM    Specimen: Blood   Result Value Ref Range    WBC 6.43 3.40 - 10.80 10*3/mm3    RBC 3.93 3.77 - 5.28 10*6/mm3    Hemoglobin 11.3 (L) 12.0 - 15.9 g/dL    Hematocrit 35.3 34.0 - 46.6 %    MCV 89.8 79.0 - 97.0 fL    MCH 28.8 26.6 - 33.0 pg    MCHC  32.0 31.5 - 35.7 g/dL    RDW 14.7 12.3 - 15.4 %    RDW-SD 47.5 37.0 - 54.0 fl    MPV 10.2 6.0 - 12.0 fL    Platelets 183 140 - 450 10*3/mm3    Neutrophil % 74.8 42.7 - 76.0 %    Lymphocyte % 10.9 (L) 19.6 - 45.3 %    Monocyte % 8.2 5.0 - 12.0 %    Eosinophil % 4.7 0.3 - 6.2 %    Basophil % 0.8 0.0 - 1.5 %    Immature Grans % 0.6 (H) 0.0 - 0.5 %    Neutrophils, Absolute 4.81 1.70 - 7.00 10*3/mm3    Lymphocytes, Absolute 0.70 0.70 - 3.10 10*3/mm3    Monocytes, Absolute 0.53 0.10 - 0.90 10*3/mm3    Eosinophils, Absolute 0.30 0.00 - 0.40 10*3/mm3    Basophils, Absolute 0.05 0.00 - 0.20 10*3/mm3    Immature Grans, Absolute 0.04 0.00 - 0.05 10*3/mm3    nRBC 0.0 0.0 - 0.2 /100 WBC   High Sensitivity Troponin T    Collection Time: 07/10/23 10:44 AM    Specimen: Blood   Result Value Ref Range    HS Troponin T 225 (C) <10 ng/L       Ordered the above labs and independently reviewed the results.        RADIOLOGY  CT Abdomen Pelvis Without Contrast    Result Date: 7/10/2023  CT ABDOMEN AND PELVIS WITHOUT CONTRAST  HISTORY: 78-year-old female with left upper quadrant abdominal pain and nausea. Patient is on dialysis.  TECHNIQUE: Axial CT images of the abdomen and pelvis were obtained without administration of intravenous contrast. The patient was not given oral contrast. Coronal and sagittal reformats were obtained.  COMPARISON: None  FINDINGS: Small perihepatic fluid is present. The liver otherwise demonstrates normal attenuation. There is mild bilobar intrahepatic biliary prominence with a distended gallbladder that demonstrates wall thickening and intraluminal hyperdensity suggestive of stones. Small hyperdense foci are seen in the region of the pancreatic head may lie within the distal common bile duct as there is prominence of the proximal common bile duct and mild dilatation of the intrahepatic biliary radicles. This will need further evaluation with MRCP. The pancreatic duct is not well imaged and thus suspected to be  nondilated. The spleen is normal in size. Both native kidneys are atrophic with numerous cortical hypoattenuating foci not completely characterized due to their small size. There are in addition bilateral nonobstructing renal calculi and renal vascular calcification. A transplant kidney seen within the left superior pelvis without hydronephrosis.  Evaluation of pelvis is limited by streak artifact from patient's bilateral hip hardware. The urinary bladder is not well imaged. There is no evidence of bowel obstruction. Large amount of formed stool is seen throughout the colon suggesting constipation. Diffuse stomach wall thickening that may suggest gastritis.  Cardiomegaly is present. Bibasilar atelectatic change. Calcified atherosclerotic plaque within the abdominal aorta and its branches. Degenerative disc disease within the spine with vacuum disc phenomena at multiple levels.      Limited in the absence of contrast and extensive artifact from patient's bilateral hip hardware. There is distention of the gallbladder with wall thickening and containing calculi. There is mild intrahepatic and proximal common bile duct dilatation with suggestion of choledocholithiasis. Correlation with serum bilirubin alkaline phosphatase and further evaluation with MRCP as indicated. 2. CT findings of gastritis. 3. Constipation.  These findings were discussed with Dr. Corona by telephone at the time of dictation.  Radiation dose reduction techniques were utilized, including automated exposure control and exposure modulation based on body size.        I ordered the above noted radiological studies. Reviewed by me and discussed with radiologist.  See dictation for official radiology interpretation.      PROCEDURES    Procedures      MEDICATIONS GIVEN IN ER    Medications   sodium chloride 0.9 % flush 10 mL (has no administration in time range)   cefTRIAXone (ROCEPHIN) 1 g in sodium chloride 0.9 % 100 mL IVPB-VTB (has no administration  in time range)   ondansetron (ZOFRAN) injection 4 mg (4 mg Intravenous Given 7/10/23 1050)   HYDROmorphone (DILAUDID) injection 0.5 mg (0.5 mg Intravenous Given 7/10/23 1050)         ORDERS PLACED DURING THIS VISIT:  Orders Placed This Encounter   Procedures    Blood Culture - Blood,    Blood Culture - Blood,    CT Abdomen Pelvis Without Contrast    Comprehensive Metabolic Panel    Lipase    CBC Auto Differential    High Sensitivity Troponin T    High Sensitivity Troponin T 2Hr    Lactic Acid, Plasma    Monitor Blood Pressure    Pulse Oximetry, Continuous    Surgery (on-call MD unless specified)    [facility] (on-call MD unless specified) Details    ECG 12 Lead QT Measurement    Insert Peripheral IV    Inpatient Admission    CBC & Differential         PROGRESS, DATA ANALYSIS, CONSULTS, AND MEDICAL DECISION MAKING    All labs have been independently interpreted by me.  All radiology studies have been reviewed by me and discussed with radiologist dictating the report.   EKG's independently viewed and interpreted by me.  Discussion below represents my analysis of pertinent findings related to patient's condition, differential diagnosis, treatment plan and final disposition.    Differential diagnosis includes but is not limited to cholecystitis, pancreatitis, acute coronary syndrome, acute aortic syndrome, pneumonia.    ED Course as of 07/10/23 1351   Mon Jul 10, 2023   1049 EKG          EKG time: 1038  Rhythm/Rate: Normal sinus, rate 74  P waves and OH: Normal P, normal OH  QRS, axis: Narrow QRS, normal axis  ST and T waves: Nonspecific ST change in the lateral leads and possibly inferior leads although they are obscured by artifact and baseline wander    Independently Interpreted by me  Not significantly changed compared to prior 7/5/2023   [TR]   1212 CT Abdomen Pelvis Without Contrast  My independent interpretation of the CT of the abdomen pelvis is cholelithiasis with some pericholecystic fluid [TR]   1213 The patient now  reports she does not make any urine.  I am canceling the urinalysis. [TR]   1214 AST (SGOT)(!): 48 [TR]   1214 Alkaline Phosphatase(!): 679 [TR]   1214 Total Bilirubin(!): 1.3  New transaminase elevation from July 5. [TR]   1223 Discussing with the radiologist by phone.  CT of the abdomen pelvis shows likely cholelithiasis with cholecystitis.  It is hampered by lack of IV contrast. [TR]   1247 I reviewed the work-up and findings with the patient and family at the bedside.  Answered all questions.  Plan admission.  She has been n.p.o. since last night.  She last took her Xarelto last night.  They do request Dr. Arteaga if he is available. [TR]   1247 Discussing with Dr. Llamas with A.  He agrees to admit. [TR]   1247 HS Troponin T(!!): 225  I suspect that her troponin is related to end-stage renal disease and not related to acute coronary syndrome.  We will trend. [TR]   1351 Discussing with Dr. Ivey with general surgery.  He agrees to consult. [TR]      ED Course User Index  [TR] Silvestre Corona MD       I interpreted the cardiac monitor rhythm and my independent interpretation is: normal sinus rhythm, rate of 72. The patient presents with upper abdominal in the setting of end-stage renal disease pain and the cardiac monitor was ordered to monitor for arrhythmias.          AS OF 13:51 EDT VITALS:    BP - 163/79  HR - 72  TEMP - 97.8 °F (36.6 °C) (Tympanic)  O2 SATS - 100%        DIAGNOSIS  Final diagnoses:   Abdominal pain, acute, right upper quadrant   Calculus of gallbladder and bile duct with acute cholecystitis, with obstruction   ESRD on dialysis         DISPOSITION  ED Disposition       ED Disposition   Decision to Admit    Condition   --    Comment   Level of Care: Telemetry [5]   Diagnosis: Acute cholecystitis [575.0.ICD-9-CM]   Admitting Physician: ISMAEL LLAMAS [[id number]]   Attending Physician: ISMAEL LLAMAS [[id number]]   Certification: I Certify That Inpatient Hospital Services Are Medically  Necessary For Greater Than 2 Midnights                    Note Disclaimer: At [facility], we believe that sharing information builds trust and better relationships. You are receiving this note because you recently visited [facility]. It is possible you will see health information before a provider has talked with you about it. This kind of information can be easy to misunderstand. To help you fully understand what it means for your health, we urge you to discuss this note with your provider.         Silvestre Corona MD  07/10/23 4599

## 2023-07-10 NOTE — H&P
Patient Name:  Kalyn Mejia  YOB: 1945  MRN:  [id number]  Admit Date:  7/10/2023  Patient Care Team:  Provider, No Known as PCP - General  Edward Mcdonnell MD (Inactive) as PCP - Family Medicine      Subjective   History Present Illness     Chief Complaint   Patient presents with    Abdominal Pain       Ms. Mejia is a 78 y.o. that resides in [place] but is visiting family. She has a history of COPD, CHF, end-stage renal disease on hemodialysis, history of rejected renal transplant, HTN paroxysmal atrial fibrillation, polyneuropathy and type 1 diabetes.  Patient presents this morning with upper abdominal pain radiating to her back with no alleviating or exacerbating factors.  She was in dialysis whish had to be stopped early due to the pain.  Associated symptoms include nausea but she denies fever, chills, chest pain or diarrhea.   She was just discharged from this facility following treatment of left plantar foot wound requiring excisional debridement on 7/5/23.  There is no evidence of underlying abscess or signs of infections and she was not discharged on antibiotics due to history of C. difficile colitis.      History of Present Illness  Review of Systems   Constitutional:  Negative for appetite change and unexpected weight change.   HENT:  Negative for trouble swallowing.    Respiratory:  Negative for choking and shortness of breath.    Cardiovascular:  Negative for chest pain.   Gastrointestinal:  Positive for abdominal pain and nausea. Negative for abdominal distention, blood in stool, constipation, diarrhea and vomiting.   Musculoskeletal:  Negative for back pain.   Skin:  Negative for color change.   Neurological:  Negative for dizziness.   Hematological:  Does not bruise/bleed easily.   Psychiatric/Behavioral: Negative.        Personal History     Past Medical History:   Diagnosis Date    Cancer     CHF (congestive heart failure)     Diabetes mellitus     DVT (deep venous  thrombosis)     Gastroparesis     GERD (gastroesophageal reflux disease)     Kidney transplant failure     Neuropathy     Osteoporosis     Pulmonary nodules     Renal failure     Rheumatoid arthritis     Spinal stenosis      Past Surgical History:   Procedure Laterality Date    INCISION AND DRAINAGE LEG Left 7/5/2023    Procedure: LEFT INCISION AND DRAINAGE FOOT;  Surgeon: Justice Rosario MD;  Location: [facility];  Service: Vascular;  Laterality: Left;     Family History   Problem Relation Age of Onset    Malig Hyperthermia Neg Hx      Social History     Tobacco Use    Smoking status: Former     Types: Cigarettes    Smokeless tobacco: Former   Vaping Use    Vaping Use: Never used   Substance Use Topics    Drug use: Never     No current facility-administered medications on file prior to encounter.     Current Outpatient Medications on File Prior to Encounter   Medication Sig Dispense Refill    apixaban (ELIQUIS) 2.5 MG tablet tablet Take 1 tablet by mouth Daily. Resume on Monday. 60 tablet     Biotin 20328 MCG tablet Take 1 tablet by mouth Daily.      bumetanide (BUMEX) 1 MG tablet Take 1 tablet by mouth Daily.      carvedilol (COREG) 6.25 MG tablet Take 1 tablet by mouth 2 (Two) Times a Day With Meals.      cetirizine (zyrTEC) 10 MG tablet Take 1 tablet by mouth Daily.      Cholecalciferol (Vitamin D3) 50 MCG (2000 UT) capsule Take 1 capsule by mouth 2 (Two) Times a Day.      ferrous sulfate 325 (65 FE) MG tablet Take 1 tablet by mouth Every Other Day.      Insulin Aspart (novoLOG) 100 UNIT/ML injection Inject  under the skin into the appropriate area as directed 3 (Three) Times a Day Before Meals. 1 unit for every 50 above 150      insulin glargine (LANTUS, SEMGLEE) 100 UNIT/ML injection Inject 11 Units under the skin into the appropriate area as directed Daily.      insulin glargine (LANTUS, SEMGLEE) 100 UNIT/ML injection Inject 10 Units under the skin into the appropriate area as directed Every Night.       Multiple Vitamins-Minerals (b complex-C-E-zinc) tablet Take 1 tablet by mouth Daily.      omeprazole (PriLOSEC) 40 MG capsule Take 1 capsule by mouth daily. NEEDS APPT FOR FURTHER REFILLS 30 capsule 0    Patiromer Sorbitex Calcium (VELTASSA PO) Take 8.4 g by mouth Every Other Day.      pravastatin (PRAVACHOL) 40 MG tablet Take 1 tablet by mouth daily. 30 tablet 3    senna (SENOKOT) 8.6 MG tablet Take 1 tablet by mouth daily. 90 tablet 0     Allergies   Allergen Reactions    Ativan [Lorazepam] Unknown - High Severity    Sulfa Antibiotics Nausea And Vomiting    Vancomycin Itching       Objective    Objective     Vital Signs  Temp:  [97.8 °F (36.6 °C)] 97.8 °F (36.6 °C)  Heart Rate:  [69-75] 70  Resp:  [16] 16  BP: (108-185)/(65-85) 159/65  SpO2:  [96 %-100 %] 98 %  on   ;   Device (Oxygen Therapy): room air  Body mass index is 22.31 kg/m².    Physical Exam  Vitals and nursing note reviewed.   Constitutional:       Appearance: She is well-developed. She is ill-appearing.   HENT:      Head: Normocephalic and atraumatic.      Mouth/Throat:      Mouth: Mucous membranes are moist.   Cardiovascular:      Rate and Rhythm: Normal rate and regular rhythm.      Heart sounds: Normal heart sounds.   Pulmonary:      Effort: Pulmonary effort is normal.      Breath sounds: Normal breath sounds.   Abdominal:      General: Bowel sounds are normal. There is no distension or abdominal bruit.      Palpations: Abdomen is soft. Abdomen is not rigid. There is no shifting dullness, fluid wave, mass or pulsatile mass.      Tenderness: There is abdominal tenderness. There is no guarding.      Hernia: No hernia is present.   Musculoskeletal:         General: Normal range of motion.   Skin:     General: Skin is warm and dry.      Comments: LLE Wound, foot wrapped    Neurological:      General: No focal deficit present.      Mental Status: She is alert.   Psychiatric:         Mood and Affect: Mood normal.         Behavior: Behavior normal.          Thought Content: Thought content normal.       Results Review:  I reviewed the patient's new clinical results.  I reviewed the patient's new imaging results and agree with the interpretation.  I reviewed the patient's other test results and agree with the interpretation  I personally viewed and interpreted the patient's EKG/Telemetry data  Discussed with ED provider.    Lab Results (last 24 hours)       Procedure Component Value Units Date/Time    CBC & Differential [[id number]]  (Abnormal) Collected: 07/10/23 1043    Specimen: Blood Updated: 07/10/23 1054    Narrative:      The following orders were created for panel order CBC & Differential.  Procedure                               Abnormality         Status                     ---------                               -----------         ------                     CBC Auto Differential[[id number]]        Abnormal            Final result                 Please view results for these tests on the individual orders.    Comprehensive Metabolic Panel [[id number]]  (Abnormal) Collected: 07/10/23 1043    Specimen: Blood Updated: 07/10/23 1115     Glucose 147 mg/dL      BUN 18 mg/dL      Creatinine 3.61 mg/dL      Sodium 139 mmol/L      Potassium 4.3 mmol/L      Comment: Slight hemolysis detected by analyzer. Results may be affected.        Chloride 96 mmol/L      CO2 29.7 mmol/L      Calcium 10.0 mg/dL      Total Protein 6.8 g/dL      Albumin 3.6 g/dL      ALT (SGPT) 132 U/L      AST (SGOT) 48 U/L      Comment: Slight hemolysis detected by analyzer. Results may be affected.        Alkaline Phosphatase 679 U/L      Total Bilirubin 1.3 mg/dL      Globulin 3.2 gm/dL      A/G Ratio 1.1 g/dL      BUN/Creatinine Ratio 5.0     Anion Gap 13.3 mmol/L      eGFR 12.4 mL/min/1.73      Comment: <15 Indicative of kidney failure       Narrative:      GFR Normal >60  Chronic Kidney Disease <60  Kidney Failure <15    The GFR formula is only valid for adults with stable renal function  between ages 18 and 70.    Lipase [[id number]]  (Normal) Collected: 07/10/23 1043    Specimen: Blood Updated: 07/10/23 1114     Lipase 38 U/L     CBC Auto Differential [[id number]]  (Abnormal) Collected: 07/10/23 1043    Specimen: Blood Updated: 07/10/23 1054     WBC 6.43 10*3/mm3      RBC 3.93 10*6/mm3      Hemoglobin 11.3 g/dL      Hematocrit 35.3 %      MCV 89.8 fL      MCH 28.8 pg      MCHC 32.0 g/dL      RDW 14.7 %      RDW-SD 47.5 fl      MPV 10.2 fL      Platelets 183 10*3/mm3      Neutrophil % 74.8 %      Lymphocyte % 10.9 %      Monocyte % 8.2 %      Eosinophil % 4.7 %      Basophil % 0.8 %      Immature Grans % 0.6 %      Neutrophils, Absolute 4.81 10*3/mm3      Lymphocytes, Absolute 0.70 10*3/mm3      Monocytes, Absolute 0.53 10*3/mm3      Eosinophils, Absolute 0.30 10*3/mm3      Basophils, Absolute 0.05 10*3/mm3      Immature Grans, Absolute 0.04 10*3/mm3      nRBC 0.0 /100 WBC     High Sensitivity Troponin T [[id number]]  (Abnormal) Collected: 07/10/23 1044    Specimen: Blood Updated: 07/10/23 1245     HS Troponin T 225 ng/L     Narrative:      High Sensitive Troponin T Reference Range:  <10.0 ng/L- Negative Female for AMI  <15.0 ng/L- Negative Male for AMI  >=10 - Abnormal Female indicating possible myocardial injury.  >=15 - Abnormal Male indicating possible myocardial injury.   Clinicians would have to utilize clinical acumen, EKG, Troponin, and serial changes to determine if it is an Acute Myocardial Infarction or myocardial injury due to an underlying chronic condition.         High Sensitivity Troponin T 2Hr [[id number]]  (Abnormal) Collected: 07/10/23 1351    Specimen: Blood Updated: 07/10/23 1429     HS Troponin T 227 ng/L      Troponin T Delta 2 ng/L     Narrative:      High Sensitive Troponin T Reference Range:  <10.0 ng/L- Negative Female for AMI  <15.0 ng/L- Negative Male for AMI  >=10 - Abnormal Female indicating possible myocardial injury.  >=15 - Abnormal Male indicating possible myocardial  injury.   Clinicians would have to utilize clinical acumen, EKG, Troponin, and serial changes to determine if it is an Acute Myocardial Infarction or myocardial injury due to an underlying chronic condition.         Lactic Acid, Plasma [[id number]]  (Normal) Collected: 07/10/23 1351    Specimen: Blood Updated: 07/10/23 1423     Lactate 1.1 mmol/L     Blood Culture - Blood, Arm, Left [[id number]] Collected: 07/10/23 1351    Specimen: Blood from Arm, Left Updated: 07/10/23 1357            Imaging Results (Last 24 Hours)       Procedure Component Value Units Date/Time    CT Abdomen Pelvis Without Contrast [[id number]] Collected: 07/10/23 1246     Updated: 07/10/23 1246    Narrative:      CT ABDOMEN AND PELVIS WITHOUT CONTRAST     HISTORY: 78-year-old female with left upper quadrant abdominal pain and  nausea. Patient is on dialysis.     TECHNIQUE: Axial CT images of the abdomen and pelvis were obtained  without administration of intravenous contrast. The patient was not  given oral contrast. Coronal and sagittal reformats were obtained.     COMPARISON: None     FINDINGS: Small perihepatic fluid is present. The liver otherwise  demonstrates normal attenuation. There is mild bilobar intrahepatic  biliary prominence with a distended gallbladder that demonstrates wall  thickening and intraluminal hyperdensity suggestive of stones. Small  hyperdense foci are seen in the region of the pancreatic head may lie  within the distal common bile duct as there is prominence of the  proximal common bile duct and mild dilatation of the intrahepatic  biliary radicles. This will need further evaluation with MRCP. The  pancreatic duct is not well imaged and thus suspected to be nondilated.  The spleen is normal in size. Both native kidneys are atrophic with  numerous cortical hypoattenuating foci not completely characterized due  to their small size. There are in addition bilateral nonobstructing  renal calculi and renal vascular  calcification. A transplant kidney seen  within the left superior pelvis without hydronephrosis.     Evaluation of pelvis is limited by streak artifact from patient's  bilateral hip hardware. The urinary bladder is not well imaged. There is  no evidence of bowel obstruction. Large amount of formed stool is seen  throughout the colon suggesting constipation. Diffuse stomach wall  thickening that may suggest gastritis.     Cardiomegaly is present. Bibasilar atelectatic change. Calcified  atherosclerotic plaque within the abdominal aorta and its branches.  Degenerative disc disease within the spine with vacuum disc phenomena at  multiple levels.       Impression:      Limited in the absence of contrast and extensive artifact  from patient's bilateral hip hardware. There is distention of the  gallbladder with wall thickening and containing calculi. There is mild  intrahepatic and proximal common bile duct dilatation with suggestion of  choledocholithiasis. Correlation with serum bilirubin alkaline  phosphatase and further evaluation with MRCP as indicated.  2. CT findings of gastritis.  3. Constipation.     These findings were discussed with Dr. Corona by telephone at the time  of dictation.     Radiation dose reduction techniques were utilized, including automated  exposure control and exposure modulation based on body size.                  Results for orders placed in visit on 06/30/14    SCANNED - ECHOCARDIOGRAM      ECG 12 Lead QT Measurement   Final Result   HEART RATE= 74  bpm   RR Interval= 811  ms   DC Interval= 156  ms   P Horizontal Axis= -3  deg   P Front Axis= 86  deg   QRSD Interval= 103  ms   QT Interval= 476  ms   QRS Axis= 266  deg   T Wave Axis= -61  deg   - ABNORMAL ECG -   Sinus rhythm   Incomplete right bundle branch block   Prolonged QT interval   No change from previous tracing   Electronically Signed By: Pretty Jenkins ([facility]) 10-Jul-2023 14:29:46   Date and Time of Study: 2023-07-10 10:38:45            Assessment/Plan     Active Hospital Problems    Diagnosis  POA    **Acute cholecystitis [K81.0]  Yes    Diastolic CHF, chronic [I50.32]  Yes    Anemia [D64.9]  Yes    COPD (chronic obstructive pulmonary disease) [J44.9]  Yes    DM (diabetes mellitus) [E11.9]  Yes    ESRD (end stage renal disease) [N18.6]  Yes    History of colon cancer [Z85.038]  Yes    HTN (hypertension) [I10]  Yes    PAF (paroxysmal atrial fibrillation) [I48.0]  Yes    Peripheral neuropathy [G62.9]  Yes      Resolved Hospital Problems   No resolved problems to display.       Ms. Mejia is a 78 y.o. with ESRD admitted with abdominal pain.  CT abdomen pelvis with cholelithiasis with cholecystitis but imaging limited by lack of contrast.  She is on anticoagulation with Xarelto taken last night.  Troponin elevated 225 setting of ESRD.    Acute cholecystitis  Consult placed to general surgery. NPO. Provide symptomatic care. Normal lactic acid, WBC and afebrile. Continue IV abx    ESRD  She did not complete dialysis this monring due to pain. Consult Dr. Sheth who followed her last admission.     Left plantar foot wound with large callus:  Status post excisional debridement 7/5/2023.  Doing well postop. Wound care to follow.      Diabetes type 1/Peripheral neuropathy:  Glucose previously difficult to control. Avoid hypoglycemia.   Continue sliding scale.  hold Lantus while NPO.     Paroxysmal atrial fibrillation: Hold anticoagulation. Rate controlled    Gastritis- per CT. Add pepcid IV    Anemia: Likely secondary to ESRD.  Monitor.    COPD: Clinically stable.    Hypertension: Continue present regimen.  Monitor.     CHF:  manage with dialysis   No available echocardiogram for reference.    Continue home medications if appropriate when med rec reviewed by nursing staff.   I discussed the patient's findings and my recommendations with patient, family, ED provider, and Dr Kowalski .    VTE Prophylaxis - SCDs.  Code Status - Full code.       Judy HICKS  CINDY Deng  [facility]  07/10/23  14:39 EDT

## 2023-07-11 ENCOUNTER — APPOINTMENT (OUTPATIENT)
Dept: ULTRASOUND IMAGING | Facility: HOSPITAL | Age: 78
DRG: 417 | End: 2023-07-11
Payer: MEDICARE

## 2023-07-11 ENCOUNTER — APPOINTMENT (OUTPATIENT)
Dept: GENERAL RADIOLOGY | Facility: HOSPITAL | Age: 78
DRG: 417 | End: 2023-07-11
Payer: MEDICARE

## 2023-07-11 LAB
ALBUMIN SERPL-MCNC: 3 G/DL (ref 3.5–5.2)
ALBUMIN/GLOB SERPL: 1.2 G/DL
ALP SERPL-CCNC: 989 U/L (ref 39–117)
ALT SERPL W P-5'-P-CCNC: 226 U/L (ref 1–33)
ANION GAP SERPL CALCULATED.3IONS-SCNC: 13.1 MMOL/L (ref 5–15)
AST SERPL-CCNC: 250 U/L (ref 1–32)
BASOPHILS # BLD AUTO: 0.06 10*3/MM3 (ref 0–0.2)
BASOPHILS NFR BLD AUTO: 1.2 % (ref 0–1.5)
BILIRUB SERPL-MCNC: 2.6 MG/DL (ref 0–1.2)
BUN SERPL-MCNC: 22 MG/DL (ref 8–23)
BUN/CREAT SERPL: 4.7 (ref 7–25)
CALCIUM SPEC-SCNC: 9.1 MG/DL (ref 8.6–10.5)
CHLORIDE SERPL-SCNC: 99 MMOL/L (ref 98–107)
CO2 SERPL-SCNC: 23.9 MMOL/L (ref 22–29)
CREAT SERPL-MCNC: 4.72 MG/DL (ref 0.57–1)
DEPRECATED RDW RBC AUTO: 46.3 FL (ref 37–54)
EGFRCR SERPLBLD CKD-EPI 2021: 9 ML/MIN/1.73
EOSINOPHIL # BLD AUTO: 0.26 10*3/MM3 (ref 0–0.4)
EOSINOPHIL NFR BLD AUTO: 5.1 % (ref 0.3–6.2)
ERYTHROCYTE [DISTWIDTH] IN BLOOD BY AUTOMATED COUNT: 14.5 % (ref 12.3–15.4)
GLOBULIN UR ELPH-MCNC: 2.6 GM/DL
GLUCOSE BLDC GLUCOMTR-MCNC: 169 MG/DL (ref 70–130)
GLUCOSE BLDC GLUCOMTR-MCNC: 59 MG/DL (ref 70–130)
GLUCOSE BLDC GLUCOMTR-MCNC: 86 MG/DL (ref 70–130)
GLUCOSE SERPL-MCNC: 79 MG/DL (ref 65–99)
HCT VFR BLD AUTO: 30.2 % (ref 34–46.6)
HGB BLD-MCNC: 9.7 G/DL (ref 12–15.9)
IMM GRANULOCYTES # BLD AUTO: 0.03 10*3/MM3 (ref 0–0.05)
IMM GRANULOCYTES NFR BLD AUTO: 0.6 % (ref 0–0.5)
LYMPHOCYTES # BLD AUTO: 0.87 10*3/MM3 (ref 0.7–3.1)
LYMPHOCYTES NFR BLD AUTO: 17.1 % (ref 19.6–45.3)
MAGNESIUM SERPL-MCNC: 2.3 MG/DL (ref 1.6–2.4)
MCH RBC QN AUTO: 28.3 PG (ref 26.6–33)
MCHC RBC AUTO-ENTMCNC: 32.1 G/DL (ref 31.5–35.7)
MCV RBC AUTO: 88 FL (ref 79–97)
MONOCYTES # BLD AUTO: 0.68 10*3/MM3 (ref 0.1–0.9)
MONOCYTES NFR BLD AUTO: 13.4 % (ref 5–12)
NEUTROPHILS NFR BLD AUTO: 3.18 10*3/MM3 (ref 1.7–7)
NEUTROPHILS NFR BLD AUTO: 62.6 % (ref 42.7–76)
NRBC BLD AUTO-RTO: 0 /100 WBC (ref 0–0.2)
PHOSPHATE SERPL-MCNC: 5 MG/DL (ref 2.5–4.5)
PLATELET # BLD AUTO: 154 10*3/MM3 (ref 140–450)
PMV BLD AUTO: 10.3 FL (ref 6–12)
POTASSIUM SERPL-SCNC: 4.5 MMOL/L (ref 3.5–5.2)
PROT SERPL-MCNC: 5.6 G/DL (ref 6–8.5)
RBC # BLD AUTO: 3.43 10*6/MM3 (ref 3.77–5.28)
SODIUM SERPL-SCNC: 136 MMOL/L (ref 136–145)
WBC NRBC COR # BLD: 5.08 10*3/MM3 (ref 3.4–10.8)

## 2023-07-11 PROCEDURE — 25010000002 PIPERACILLIN SOD-TAZOBACTAM PER 1 G: Performed by: STUDENT IN AN ORGANIZED HEALTH CARE EDUCATION/TRAINING PROGRAM

## 2023-07-11 PROCEDURE — 5A1D70Z PERFORMANCE OF URINARY FILTRATION, INTERMITTENT, LESS THAN 6 HOURS PER DAY: ICD-10-PCS | Performed by: INTERNAL MEDICINE

## 2023-07-11 PROCEDURE — 99232 SBSQ HOSP IP/OBS MODERATE 35: CPT | Performed by: INTERNAL MEDICINE

## 2023-07-11 PROCEDURE — 84100 ASSAY OF PHOSPHORUS: CPT | Performed by: STUDENT IN AN ORGANIZED HEALTH CARE EDUCATION/TRAINING PROGRAM

## 2023-07-11 PROCEDURE — 71045 X-RAY EXAM CHEST 1 VIEW: CPT

## 2023-07-11 PROCEDURE — 25010000002 PROCHLORPERAZINE 10 MG/2ML SOLUTION: Performed by: NURSE PRACTITIONER

## 2023-07-11 PROCEDURE — 85025 COMPLETE CBC W/AUTO DIFF WBC: CPT | Performed by: INTERNAL MEDICINE

## 2023-07-11 PROCEDURE — 25010000002 ONDANSETRON PER 1 MG: Performed by: NURSE PRACTITIONER

## 2023-07-11 PROCEDURE — 82948 REAGENT STRIP/BLOOD GLUCOSE: CPT

## 2023-07-11 PROCEDURE — 99222 1ST HOSP IP/OBS MODERATE 55: CPT | Performed by: INTERNAL MEDICINE

## 2023-07-11 PROCEDURE — 25010000002 EPOETIN ALFA-EPBX 3000 UNIT/ML SOLUTION: Performed by: INTERNAL MEDICINE

## 2023-07-11 PROCEDURE — 83735 ASSAY OF MAGNESIUM: CPT | Performed by: STUDENT IN AN ORGANIZED HEALTH CARE EDUCATION/TRAINING PROGRAM

## 2023-07-11 PROCEDURE — 99232 SBSQ HOSP IP/OBS MODERATE 35: CPT | Performed by: SURGERY

## 2023-07-11 PROCEDURE — 80053 COMPREHEN METABOLIC PANEL: CPT | Performed by: INTERNAL MEDICINE

## 2023-07-11 PROCEDURE — 76700 US EXAM ABDOM COMPLETE: CPT

## 2023-07-11 RX ORDER — NICOTINE POLACRILEX 4 MG
15 LOZENGE BUCCAL
Status: DISCONTINUED | OUTPATIENT
Start: 2023-07-11 | End: 2023-07-18 | Stop reason: HOSPADM

## 2023-07-11 RX ORDER — MULTIPLE VITAMINS W/ MINERALS TAB 9MG-400MCG
1 TAB ORAL DAILY
Status: DISCONTINUED | OUTPATIENT
Start: 2023-07-11 | End: 2023-07-18 | Stop reason: HOSPADM

## 2023-07-11 RX ORDER — DEXTROSE MONOHYDRATE 25 G/50ML
25 INJECTION, SOLUTION INTRAVENOUS
Status: DISCONTINUED | OUTPATIENT
Start: 2023-07-11 | End: 2023-07-18 | Stop reason: HOSPADM

## 2023-07-11 RX ORDER — PROCHLORPERAZINE EDISYLATE 5 MG/ML
5 INJECTION INTRAMUSCULAR; INTRAVENOUS ONCE
Status: COMPLETED | OUTPATIENT
Start: 2023-07-11 | End: 2023-07-11

## 2023-07-11 RX ORDER — IBUPROFEN 600 MG/1
1 TABLET ORAL
Status: DISCONTINUED | OUTPATIENT
Start: 2023-07-11 | End: 2023-07-18 | Stop reason: HOSPADM

## 2023-07-11 RX ADMIN — PIPERACILLIN SODIUM AND TAZOBACTAM SODIUM 3.38 G: 3; .375 INJECTION, SOLUTION INTRAVENOUS at 17:04

## 2023-07-11 RX ADMIN — DEXTROSE MONOHYDRATE 25 G: 25 INJECTION, SOLUTION INTRAVENOUS at 21:04

## 2023-07-11 RX ADMIN — Medication 10 ML: at 08:12

## 2023-07-11 RX ADMIN — CARVEDILOL 6.25 MG: 6.25 TABLET, FILM COATED ORAL at 17:16

## 2023-07-11 RX ADMIN — Medication 2000 UNITS: at 08:09

## 2023-07-11 RX ADMIN — CARVEDILOL 6.25 MG: 6.25 TABLET, FILM COATED ORAL at 08:09

## 2023-07-11 RX ADMIN — BUMETANIDE 1 MG: 1 TABLET ORAL at 08:09

## 2023-07-11 RX ADMIN — PROCHLORPERAZINE EDISYLATE 5 MG: 5 INJECTION INTRAMUSCULAR; INTRAVENOUS at 21:29

## 2023-07-11 RX ADMIN — SENNOSIDES AND DOCUSATE SODIUM 2 TABLET: 50; 8.6 TABLET ORAL at 08:09

## 2023-07-11 RX ADMIN — EPOETIN ALFA-EPBX 6000 UNITS: 3000 INJECTION, SOLUTION INTRAVENOUS; SUBCUTANEOUS at 17:02

## 2023-07-11 RX ADMIN — ONDANSETRON 4 MG: 2 INJECTION INTRAMUSCULAR; INTRAVENOUS at 19:03

## 2023-07-11 RX ADMIN — Medication 1 APPLICATION: at 17:17

## 2023-07-11 RX ADMIN — MULTIPLE VITAMINS W/ MINERALS TAB 1 TABLET: TAB at 17:02

## 2023-07-11 NOTE — PROGRESS NOTES
Name: Kalyn Mejia ADMIT: 7/10/2023   : 1945  PCP: Provider, No Known    MRN: [id number] LOS: 1 days   AGE/SEX: 78 y.o. female  ROOM: [facility]     Subjective   Subjective   Laying in bed.  Feeling better.  Abdominal pain much better, no nausea no vomiting, fevers or chills.       Review of Systems   As above  Objective   Objective   Vital Signs  Temp:  [97.6 °F (36.4 °C)-98.3 °F (36.8 °C)] 97.6 °F (36.4 °C)  Heart Rate:  [62-75] 62  Resp:  [16] 16  BP: (121-185)/(60-85) 145/67  SpO2:  [82 %-100 %] 98 %  on  Flow (L/min):  [1-2] 2;   Device (Oxygen Therapy): nasal cannula  Body mass index is 22.31 kg/m².  Physical Exam    General alert, oriented x3, laying in bed  HEENT: Normocephalic, atraumatic  CV: Regular rate and rhythm, no murmurs   Lungs: Clear to auscultation bilaterally, no crackles or wheezes  Abdomen: Soft, symptoms, mild tenderness epigastric region,  Extremities: No significant peripheral edema , no cyanosis     Results Review     I reviewed the patient's new clinical results.  Results from last 7 days   Lab Units 07/11/23  0622 07/10/23  1043 23  0648 23  0645   WBC 10*3/mm3 5.08 6.43 6.54 10.43   HEMOGLOBIN g/dL 9.7* 11.3* 10.2* 10.5*   PLATELETS 10*3/mm3 154 183 170 203     Results from last 7 days   Lab Units 23  0622 07/10/23  1043 23  0648 23  0939   SODIUM mmol/L 136 139 139 131*   POTASSIUM mmol/L 4.5 4.3 4.2 5.0   CHLORIDE mmol/L 99 96* 100 93*   CO2 mmol/L 23.9 29.7* 25.6 23.2   BUN mg/dL 22 18 14 30*   CREATININE mg/dL 4.72* 3.61* 2.77* 4.95*   GLUCOSE mg/dL 79 147* 104* 112*   Estimated Creatinine Clearance: 8.6 mL/min (A) (by C-G formula based on SCr of 4.72 mg/dL (H)).  Results from last 7 days   Lab Units 23  0622 07/10/23  1043 23  0939 23  1829   ALBUMIN g/dL 3.0* 3.6 3.4* 3.7   BILIRUBIN mg/dL 2.6* 1.3* 0.5 0.3   ALK PHOS U/L 989* 679* 96 118*   AST (SGOT) U/L 250* 48* 17 14   ALT (SGPT) U/L 226* 132* 15 13     Results from  last 7 days   Lab Units 07/11/23  0622 07/10/23  1043 07/06/23  0648 07/05/23  0939 07/04/23  1829   CALCIUM mg/dL 9.1 10.0 9.3 8.8 9.6   ALBUMIN g/dL 3.0* 3.6  --  3.4* 3.7   MAGNESIUM mg/dL 2.3  --   --   --   --    PHOSPHORUS mg/dL 5.0*  --   --   --   --      Results from last 7 days   Lab Units 07/10/23  1351 07/04/23  2129 07/04/23  1829   LACTATE mmol/L 1.1 0.9 2.4*   No results found for: COVID19  Glucose   Date/Time Value Ref Range Status   07/11/2023 0620 86 70 - 130 mg/dL Final     Comment:     Meter: [id number] : [id number] Ramon Lucero NA   07/10/2023 2009 85 70 - 130 mg/dL Final     Comment:     Meter: [id number] : [id number] Elizabeth Sherman NA   07/10/2023 1642 103 70 - 130 mg/dL Final     Comment:     Meter: [id number] : [id number] Zahra Davenport NA           CT Abdomen Pelvis Without Contrast  Narrative: CT ABDOMEN AND PELVIS WITHOUT CONTRAST     HISTORY: 78-year-old female with left upper quadrant abdominal pain and  nausea. Patient is on dialysis.     TECHNIQUE: Axial CT images of the abdomen and pelvis were obtained  without administration of intravenous contrast. The patient was not  given oral contrast. Coronal and sagittal reformats were obtained.     COMPARISON: None     FINDINGS: Small perihepatic fluid is present. The liver otherwise  demonstrates normal attenuation. There is mild bilobar intrahepatic  biliary prominence with a distended gallbladder that demonstrates wall  thickening and intraluminal hyperdensity suggestive of stones. Small  hyperdense foci are seen in the region of the pancreatic head may lie  within the distal common bile duct as there is prominence of the  proximal common bile duct and mild dilatation of the intrahepatic  biliary radicles. This will need further evaluation with MRCP. The  pancreatic duct is not well imaged and thus suspected to be nondilated.  The spleen is normal in size. Both native kidneys are atrophic with  numerous cortical  hypoattenuating foci not completely characterized due  to their small size. There are in addition bilateral nonobstructing  renal calculi and renal vascular calcification. A transplant kidney seen  within the left superior pelvis without hydronephrosis.     Evaluation of pelvis is limited by streak artifact from patient's  bilateral hip hardware. The urinary bladder is not well imaged. There is  no evidence of bowel obstruction. Large amount of formed stool is seen  throughout the colon suggesting constipation. Diffuse stomach wall  thickening that may suggest gastritis.     Cardiomegaly is present. Bibasilar atelectatic change. Calcified  atherosclerotic plaque within the abdominal aorta and its branches.  Degenerative disc disease within the spine with vacuum disc phenomena at  multiple levels.     Impression: Limited in the absence of contrast and extensive artifact  from patient's bilateral hip hardware. There is distention of the  gallbladder with wall thickening and containing calculi. There is mild  intrahepatic and proximal common bile duct dilatation with suggestion of  choledocholithiasis. Correlation with serum bilirubin alkaline  phosphatase and further evaluation with MRCP as indicated.  2. CT findings of gastritis.  3. Constipation.     These findings were discussed with Dr. Corona by telephone at the time  of dictation.     Radiation dose reduction techniques were utilized, including automated  exposure control and exposure modulation based on body size.          Scheduled Medications  bumetanide, 1 mg, Oral, Daily  cadexomer iodine, 1 application , Topical, Daily  carvedilol, 6.25 mg, Oral, BID With Meals  cholecalciferol, 2,000 Units, Oral, Daily  ferrous sulfate, 325 mg, Oral, Every Other Day  PATIENT SUPPLIED MEDICATION, 1 tablet, Oral, Daily  piperacillin-tazobactam, 3.375 g, Intravenous, Once  piperacillin-tazobactam, 3.375 g, Intravenous, Q8H  senna-docusate sodium, 2 tablet, Oral, BID  sodium  chloride, 10 mL, Intravenous, Q12H    Infusions   Diet  Diet: Liquid Diets; Clear Liquid; Texture: Regular Texture (IDDSI 7); Fluid Consistency: Thin (IDDSI 0)    I have personally reviewed     [x]  Laboratory   [x]  Microbiology   [x]  Radiology   [x]  EKG/Telemetry  []  Cardiology/Vascular   []  Pathology    []  Records       Assessment/Plan     Active Hospital Problems    Diagnosis  POA    **Acute cholecystitis [K81.0]  Yes    Diastolic CHF, chronic [I50.32]  Yes    Anemia [D64.9]  Yes    COPD (chronic obstructive pulmonary disease) [J44.9]  Yes    DM (diabetes mellitus) [E11.9]  Yes    ESRD (end stage renal disease) [N18.6]  Yes    History of colon cancer [Z85.038]  Yes    HTN (hypertension) [I10]  Yes    PAF (paroxysmal atrial fibrillation) [I48.0]  Yes    Peripheral neuropathy [G62.9]  Yes      Resolved Hospital Problems   No resolved problems to display.       78 y.o. female admitted with Acute cholecystitis.    Ms. Mejia is a 78 y.o. with ESRD admitted with abdominal pain.  CT abdomen pelvis with cholelithiasis with cholecystitis but imaging limited by lack of contrast.       Acute cholecystitis-initiated on IV Zosyn.  Surgery and gastroenterology consults.  LFTs trending up, hold statin.  Will need cholecystectomy.  Cardiology evaluated for preop risk assessment.       ESRD-volume management dialysis, appreciate recs.  -     Left plantar foot wound with large callus:  Status post excisional debridement 7/5/2023.  Doing well postop.  Wound care following.     Diabetes type 1/Peripheral neuropathy:  Glucose previously difficult to control. Avoid hypoglycemia.   Continue sign scale insulin.  Holding long acting  insulin for now.  Blood glucose stable.     Paroxysmal atrial fibrillation: Hold anticoagulation. Rate controlled, on carvedilol     Gastritis- per CT. IV Pepcid.     Anemia: Likely secondary to ESRD.  Hemoglobin trended down today.  Monitor daily.  No signs of bleeding.     COPD: Clinically stable.      Hypertension: Continue present regimen.  Monitor.      CHF:  manage with dialysis   No available echocardiogram for reference.           CODE STATUS: Full code  DVT prophylaxis: SCDs  Position: To be determined      Copied text in this note has been reviewed and is accurate as of 07/11/23.         Dictated utilizing Dragon dictation        Leola Chacon MD  [facility]  07/11/23  11:04 EDT

## 2023-07-11 NOTE — PROGRESS NOTES
Chief complaint: Cholecystitis    Subjective: Patient feels better today.  Abdominal pain is much improved.  She feels hungry.    Review of systems:  Constitutional: Denies fever, chills, change in weight  Respiratory: Denies cough or wheezing    Physical exam:  Afebrile with stable vitals  General: Awake and alert, no distress  Head: Normocephalic, atraumatic  Eyes: Extraocular movements intact, no icterus  Neck: Supple, trachea midline  Respiratory: No use of accessory muscles, good bilateral chest expansion  Gastrointestinal: Soft, there is mild epigastric and right upper quadrant tenderness, no mass  Extremities: No peripheral edema, no deformity  Skin: Warm and dry    White blood cell count remains normal at 5.0 hemoglobin 9.7.  Chemistries show total bilirubin elevated a bit to 2.6, AST and ALT also up a bit at 250 and 226 respectively.    Assessment and plan:  -Acute cholecystitis and cholelithiasis, tentatively plan for cholecystectomy tomorrow  -Atrial fibrillation and history of DVT on Eliquis, being held, she has been off for approximately 48 hours now, should be appropriate for surgery tomorrow  -Chronic renal failure, currently on hemodialysis (seen in the dialysis unit)  -Peripheral vascular disease and coronary artery disease, seen by cardiology and felt to be reasonable risk for necessary surgery    Rolando Ivey MD  General and Endoscopic Surgery  [facility]    [address], [address]  [place], [place], [address]  P: [phone]  F: [phone]

## 2023-07-11 NOTE — CONSULTS
Patient Care Team:  Provider, No Known as PCP - Edward Lundberg MD (Inactive) as PCP - Family Medicine    Chief complaint: ESRD, abdominal pain       History of Present Illness  Patient is a 79 yo female with ESRD on HD MWF who presented to ED with abdominal pain. She was found to have cholecystitis and therefore admitted for further treatment.     She has history of failed transplant. She is from [facility] and is currently visiting. She recevied HD yesterday but only for about 1.5 hours given her abdominal pain.       Review of Systems   Review of Systems - History obtained from chart review and the patient  General ROS: negative  Psychological ROS: negative  Ophthalmic ROS: negative  ENT ROS: negative  Allergy and Immunology ROS: negative  Hematological and Lymphatic ROS: negative  Endocrine ROS: negative  Respiratory ROS: no cough, shortness of breath, or wheezing  Cardiovascular ROS: no chest pain or dyspnea on exertion  Gastrointestinal ROS: + abdominal pain per HPI. But currently improved  Musculoskeletal ROS: would in foot, improving   Neurological ROS: no TIA or stroke symptoms  Dermatological ROS: negative     Past Medical History:   Diagnosis Date    Cancer     CHF (congestive heart failure)     Diabetes mellitus     DVT (deep venous thrombosis)     Gastroparesis     GERD (gastroesophageal reflux disease)     Hyperlipidemia     Hypertension     Kidney transplant failure     Neuropathy     Osteoporosis     Pulmonary nodules     Renal failure     Rheumatoid arthritis     Spinal stenosis     Stroke    ,   Past Surgical History:   Procedure Laterality Date    INCISION AND DRAINAGE LEG Left 7/5/2023    Procedure: LEFT INCISION AND DRAINAGE FOOT;  Surgeon: Justice Rosario MD;  Location: [facility];  Service: Vascular;  Laterality: Left;   ,   Family History   Problem Relation Age of Onset    Malig Hyperthermia Neg Hx    ,   Social History     Socioeconomic History    Marital status:     Tobacco Use    Smoking status: Former     Packs/day: 1.50     Years: 20.00     Pack years: 30.00     Types: Cigarettes     Quit date:      Years since quittin.5    Smokeless tobacco: Former   Vaping Use    Vaping Use: Never used   Substance and Sexual Activity    Alcohol use: Yes     Alcohol/week: 1.0 standard drink     Types: 1 Shots of liquor per week     Comment: 1 conor every other week    Drug use: Never     E-cigarette/Vaping    E-cigarette/Vaping Use Never User      E-cigarette/Vaping Substances     E-cigarette/Vaping Devices         ,   Medications Prior to Admission   Medication Sig Dispense Refill Last Dose    albuterol sulfate  (90 Base) MCG/ACT inhaler Inhale 2 puffs Every 4 (Four) Hours As Needed for Wheezing.   Past Week    apixaban (ELIQUIS) 2.5 MG tablet tablet Take 1 tablet by mouth Daily. Resume on Monday. (Patient taking differently: Take 1 tablet by mouth Every Night. Resume on Monday.) 60 tablet  2023    Biotin 87799 MCG tablet Take 1 tablet by mouth Daily.   2023    bumetanide (BUMEX) 1 MG tablet Take 1 tablet by mouth Daily.   2023    carvedilol (COREG) 6.25 MG tablet Take 1 tablet by mouth 2 (Two) Times a Day With Meals.   2023    cetirizine (zyrTEC) 10 MG tablet Take 1 tablet by mouth Daily.   2023    Cholecalciferol (Vitamin D3) 50 MCG (2000 UT) capsule Take 1 capsule by mouth Daily.   2023    ferrous sulfate 325 (65 FE) MG tablet Take 1 tablet by mouth Every Other Day.   2023    Insulin Aspart (novoLOG) 100 UNIT/ML injection Inject  under the skin into the appropriate area as directed 3 (Three) Times a Day Before Meals. 1 unit for every 50 above 150   7/10/2023    insulin glargine (LANTUS, SEMGLEE) 100 UNIT/ML injection Inject 11 Units under the skin into the appropriate area as directed Daily.   7/10/2023    Multiple Vitamins-Minerals (b complex-C-E-zinc) tablet Take 1 tablet by mouth Daily.   2023    omeprazole (PriLOSEC) 40 MG  capsule Take 1 capsule by mouth daily. NEEDS APPT FOR FURTHER REFILLS (Patient taking differently: Take 20 mg by mouth Daily. NEEDS APPT FOR FURTHER REFILLS) 30 capsule 0 7/9/2023    Patiromer Sorbitex Calcium (VELTASSA PO) Take 8.4 g by mouth Every Other Day.   Past Week    pravastatin (PRAVACHOL) 40 MG tablet Take 1 tablet by mouth daily. (Patient taking differently: Take 1 tablet by mouth Every Night.) 30 tablet 3 7/9/2023    senna (SENOKOT) 8.6 MG tablet Take 1 tablet by mouth daily. 90 tablet 0 7/9/2023    insulin glargine (LANTUS, SEMGLEE) 100 UNIT/ML injection Inject 10 Units under the skin into the appropriate area as directed Every Night.      , Scheduled Meds:  bumetanide, 1 mg, Oral, Daily  cadexomer iodine, 1 application , Topical, Daily  carvedilol, 6.25 mg, Oral, BID With Meals  cholecalciferol, 2,000 Units, Oral, Daily  ferrous sulfate, 325 mg, Oral, Every Other Day  multivitamin with minerals, 1 tablet, Oral, Daily  piperacillin-tazobactam, 3.375 g, Intravenous, Once  piperacillin-tazobactam, 3.375 g, Intravenous, Q8H  senna-docusate sodium, 2 tablet, Oral, BID  sodium chloride, 10 mL, Intravenous, Q12H    , Continuous Infusions:   , PRN Meds:    acetaminophen **OR** acetaminophen **OR** acetaminophen    senna-docusate sodium **AND** polyethylene glycol **AND** bisacodyl **AND** bisacodyl    HYDROcodone-acetaminophen    HYDROmorphone    nitroglycerin    ondansetron    [COMPLETED] Insert Peripheral IV **AND** sodium chloride    sodium chloride    sodium chloride, and Allergies:  Ativan [lorazepam], Sulfa antibiotics, and Vancomycin    Objective     Vital Signs  Temp:  [97.6 °F (36.4 °C)-98.3 °F (36.8 °C)] 97.6 °F (36.4 °C)  Heart Rate:  [62-72] 62  Resp:  [16] 16  BP: (121-163)/(60-79) 145/67    I/O this shift:  In: 240 [P.O.:240]  Out: -   No intake/output data recorded.    Physical Exam  Physical Examination: General appearance - alert, well appearing, and in no distress  Mental status - alert,  oriented to person, place, and time  Eyes - pupils equal and reactive, extraocular eye movements intact  Chest - clear to auscultation, no wheezes, rales or rhonchi, symmetric air entry  Heart - normal rate, regular rhythm, normal S1, S2, no murmurs, rubs, clicks or gallops  Abdomen - soft, nontender, nondistended, no masses or organomegaly  Neurological - alert, oriented, normal speech, no focal findings or movement disorder noted  Extremities - peripheral pulses normal, no pedal edema, no clubbing or cyanosis    Results Review:    I reviewed the patient's new clinical results.    WBC WBC   Date Value Ref Range Status   07/11/2023 5.08 3.40 - 10.80 10*3/mm3 Final   07/10/2023 6.43 3.40 - 10.80 10*3/mm3 Final      HGB Hemoglobin   Date Value Ref Range Status   07/11/2023 9.7 (L) 12.0 - 15.9 g/dL Final   07/10/2023 11.3 (L) 12.0 - 15.9 g/dL Final      HCT Hematocrit   Date Value Ref Range Status   07/11/2023 30.2 (L) 34.0 - 46.6 % Final   07/10/2023 35.3 34.0 - 46.6 % Final      Platlets No results found for: LABPLAT   MCV MCV   Date Value Ref Range Status   07/11/2023 88.0 79.0 - 97.0 fL Final   07/10/2023 89.8 79.0 - 97.0 fL Final          Sodium Sodium   Date Value Ref Range Status   07/11/2023 136 136 - 145 mmol/L Final   07/10/2023 139 136 - 145 mmol/L Final      Potassium Potassium   Date Value Ref Range Status   07/11/2023 4.5 3.5 - 5.2 mmol/L Final   07/10/2023 4.3 3.5 - 5.2 mmol/L Final     Comment:     Slight hemolysis detected by analyzer. Results may be affected.      Chloride Chloride   Date Value Ref Range Status   07/11/2023 99 98 - 107 mmol/L Final   07/10/2023 96 (L) 98 - 107 mmol/L Final      CO2 CO2   Date Value Ref Range Status   07/11/2023 23.9 22.0 - 29.0 mmol/L Final   07/10/2023 29.7 (H) 22.0 - 29.0 mmol/L Final      BUN BUN   Date Value Ref Range Status   07/11/2023 22 8 - 23 mg/dL Final   07/10/2023 18 8 - 23 mg/dL Final      Creatinine Creatinine   Date Value Ref Range Status   07/11/2023  4.72 (H) 0.57 - 1.00 mg/dL Final   07/10/2023 3.61 (H) 0.57 - 1.00 mg/dL Final      Calcium Calcium   Date Value Ref Range Status   07/11/2023 9.1 8.6 - 10.5 mg/dL Final   07/10/2023 10.0 8.6 - 10.5 mg/dL Final      PO4 No results found for: CAPO4   Albumin Albumin   Date Value Ref Range Status   07/11/2023 3.0 (L) 3.5 - 5.2 g/dL Final   07/10/2023 3.6 3.5 - 5.2 g/dL Final      Magnesium Magnesium   Date Value Ref Range Status   07/11/2023 2.3 1.6 - 2.4 mg/dL Final      Uric Acid No results found for: URICACID         Assessment & Plan       Acute cholecystitis    HTN (hypertension)    ESRD (end stage renal disease)    Peripheral neuropathy    DM (diabetes mellitus)    Anemia    COPD (chronic obstructive pulmonary disease)    PAF (paroxysmal atrial fibrillation)    History of colon cancer    Diastolic CHF, chronic      Assessment & Plan  ESRD  Cholecystitis   Anemia of CKD  Hyperphosphatemia   HTN    Per patient, there are plans for surgery tomorrow (no notes yet about this).   For now, will plan for HD today in preparation for possible surgery tomorrow.   UF as tolerated   Phos elevated, but actually at goal for ESRD   EPO for anemia   Will follow   Thanks for referral    I discussed the patients findings and my recommendations with patient and family    Kina Hernandez MD  07/11/23  12:09 EDT

## 2023-07-11 NOTE — CONSULTS
Group: [facility]         CONSULT NOTE    Patient Identification:    Kalyn Mejia  78 y.o.  female  1945  [id number]            Patient Care Team:  Provider, No Known as PCP - Edward Lundberg MD (Inactive) as PCP - Family Medicine    Requesting physician: Dr. Nickerson    Reason for Consultation: Severe pulm HTN, preop risk eval      CC: Abd pain     History of Present Illness: Patient is a 78-year-old white female remote 30-pack-year smoker with a complex past medical history significant for mild COPD, reportedly enlarging lung nodule managed by her pulmonologist and [facility], type 1 diabetes, ESRD with failed kidney transplant currently on dialysis, severe valvular heart disease with previous mitral valve repair with residual moderate MS, diastolic heart failure, paroxysmal A-fib on anticoagulation who is managed by her cardiologist Dr. Mckay at [facility] as well and is in town visiting her family but unfortunately had 2 different admissions to the hospital previously for foot pain and callus and now with severe abdominal pain and patient is now diagnosed with acute cholecystitis and general surgery and gastroenterology have evaluated the patient and her cardiology and nephrology are on board and patient is undergoing dialysis and is doing better and does not have any new respiratory complaints.  Currently on 2 L but this was discontinued during my evaluation as she is without any symptoms and sats are in the mid 90s.  We have been asked to evaluate the patient given report of severe pulmonary hypertension per cardiology evaluation and request for preop risk assessment for the same    Patient states that she does not have any respiratory symptoms currently did have an episode of bronchitis around a month back for which she used her rescue inhaler briefly but otherwise does not use inhalers daily does have a pulmonologist whom she is supposed to see last week for enlarging lung nodule  and she states that she was not told that she needs any daily inhalers otherwise.  Denies any worsening cough or sputum unction chest pain or palpitations orthopnea or PND, syncopal or presyncopal events    I have reviewed the H&P as well as the notes from the other consultants taking care of the patient this admission as well as previous hospital notes (if any available) from our group physicians and summarized above      Objective     Review of Systems:  Constitutional: No fever, chills, weight loss or loss of appetite.   ENMT: No sinus congestion, postnasal drip, epistaxis, sore throat  Cardiovascular: No chest pain, palpitation or legs swelling.    Respiratory: No hemoptysis, orthopnea, PND.  Gastrointestinal: No constipation, diarrhea. +  abdominal pain   Neurology: No headache, focal weakness, numbness or dizziness.   Musculoskeletal: No joint stiffness or swelling.   Psychiatry: No agitation or behavioral changes  Lymphatic: No swollen neck glands.  Integumentary: No rash.    Past Medical History:  Past Medical History:   Diagnosis Date    Cancer     CHF (congestive heart failure)     Diabetes mellitus     DVT (deep venous thrombosis)     Gastroparesis     GERD (gastroesophageal reflux disease)     Hyperlipidemia     Hypertension     Kidney transplant failure     Neuropathy     Osteoporosis     Pulmonary nodules     Renal failure     Rheumatoid arthritis     Spinal stenosis     Stroke        Past Surgical History:  Past Surgical History:   Procedure Laterality Date    INCISION AND DRAINAGE LEG Left 7/5/2023    Procedure: LEFT INCISION AND DRAINAGE FOOT;  Surgeon: Justice Rosario MD;  Location: [facility];  Service: Vascular;  Laterality: Left;        Home Meds:  Medications Prior to Admission   Medication Sig Dispense Refill Last Dose    albuterol sulfate  (90 Base) MCG/ACT inhaler Inhale 2 puffs Every 4 (Four) Hours As Needed for Wheezing.   Past Week    apixaban (ELIQUIS) 2.5 MG tablet tablet  Take 1 tablet by mouth Daily. Resume on Monday. (Patient taking differently: Take 1 tablet by mouth Every Night. Resume on Monday.) 60 tablet  7/9/2023    Biotin 36424 MCG tablet Take 1 tablet by mouth Daily.   7/9/2023    bumetanide (BUMEX) 1 MG tablet Take 1 tablet by mouth Daily.   7/9/2023    carvedilol (COREG) 6.25 MG tablet Take 1 tablet by mouth 2 (Two) Times a Day With Meals.   7/9/2023    cetirizine (zyrTEC) 10 MG tablet Take 1 tablet by mouth Daily.   7/9/2023    Cholecalciferol (Vitamin D3) 50 MCG (2000 UT) capsule Take 1 capsule by mouth Daily.   7/9/2023    ferrous sulfate 325 (65 FE) MG tablet Take 1 tablet by mouth Every Other Day.   7/9/2023    Insulin Aspart (novoLOG) 100 UNIT/ML injection Inject  under the skin into the appropriate area as directed 3 (Three) Times a Day Before Meals. 1 unit for every 50 above 150   7/10/2023    insulin glargine (LANTUS, SEMGLEE) 100 UNIT/ML injection Inject 11 Units under the skin into the appropriate area as directed Daily.   7/10/2023    Multiple Vitamins-Minerals (b complex-C-E-zinc) tablet Take 1 tablet by mouth Daily.   7/9/2023    omeprazole (PriLOSEC) 40 MG capsule Take 1 capsule by mouth daily. NEEDS APPT FOR FURTHER REFILLS (Patient taking differently: Take 20 mg by mouth Daily. NEEDS APPT FOR FURTHER REFILLS) 30 capsule 0 7/9/2023    Patiromer Sorbitex Calcium (VELTASSA PO) Take 8.4 g by mouth Every Other Day.   Past Week    pravastatin (PRAVACHOL) 40 MG tablet Take 1 tablet by mouth daily. (Patient taking differently: Take 1 tablet by mouth Every Night.) 30 tablet 3 7/9/2023    senna (SENOKOT) 8.6 MG tablet Take 1 tablet by mouth daily. 90 tablet 0 7/9/2023    insulin glargine (LANTUS, SEMGLEE) 100 UNIT/ML injection Inject 10 Units under the skin into the appropriate area as directed Every Night.          Allergies:  Allergies   Allergen Reactions    Ativan [Lorazepam] Unknown - High Severity    Sulfa Antibiotics Nausea And Vomiting    Vancomycin Itching  "      Social History:   Social History     Socioeconomic History    Marital status:    Tobacco Use    Smoking status: Former     Packs/day: 1.50     Years: 20.00     Pack years: 30.00     Types: Cigarettes     Quit date:      Years since quittin.5    Smokeless tobacco: Former   Vaping Use    Vaping Use: Never used   Substance and Sexual Activity    Alcohol use: Yes     Alcohol/week: 1.0 standard drink     Types: 1 Shots of liquor per week     Comment: 1 conor every other week    Drug use: Never       Family History:  Family History   Problem Relation Age of Onset    Malig Hyperthermia Neg Hx        Physical Exam:  /60   Pulse 65   Temp 97.5 °F (36.4 °C) (Temporal)   Resp 16   Ht 157.5 cm (62\")   Wt 55.3 kg (122 lb)   LMP  (LMP Unknown)   SpO2 98%   BMI 22.31 kg/m²    Body mass index is 22.31 kg/m². 98% 55.3 kg (122 lb)    Vent Settings                                           Physical Exam     Constitutional: Middle-aged pt in bed,no accessory muscle use/resp distress   Head: - NCAT  Eyes: No pallor, anicteric conjunctivae  ENMT:  Mallampati 3, no oral thrush. Moist MM.   NECK: Trachea midline, No thyromegaly, no palpable cervical lymphadenopathy  Heart: RRR, no murmur. trace pedal edema   Lungs: NANCY +, decreased breath sounds with occasional right basilar crackles.  No wheezes heard    Abdomen: Soft. No tenderness, guarding or rigidity. No palpable masses  Extremities: Extremities warm and well perfused. No cyanosis/ clubbing. Shunt +   Neuro: Conscious, answers appropriately, no gross focal neuro deficits  Psych: Mood and affect appropriate    PPE recommended per [facility] infectious disease Isolation protocol for the current clinical scenario (as mentioned below) was followed.     LABS:  Results from last 7 days   Lab Units 23  0622 07/10/23  1043 23  0648 23  0939   SODIUM mmol/L 136 139 139 131*   POTASSIUM mmol/L 4.5 4.3 4.2 5.0   CHLORIDE mmol/L 99 " 96* 100 93*   CO2 mmol/L 23.9 29.7* 25.6 23.2   BUN mg/dL 22 18 14 30*   CREATININE mg/dL 4.72* 3.61* 2.77* 4.95*   CALCIUM mg/dL 9.1 10.0 9.3 8.8   BILIRUBIN mg/dL 2.6* 1.3*  --  0.5   ALK PHOS U/L 989* 679*  --  96   ALT (SGPT) U/L 226* 132*  --  15   AST (SGOT) U/L 250* 48*  --  17   GLUCOSE mg/dL 79 147* 104* 112*   WBC 10*3/mm3 5.08 6.43 6.54  --    HEMOGLOBIN g/dL 9.7* 11.3* 10.2*  --    PLATELETS 10*3/mm3 154 183 170  --        Lab Results   Component Value Date    CALCIUM 9.1 07/11/2023    PHOS 5.0 (H) 07/11/2023       Results from last 7 days   Lab Units 07/10/23  1710 07/10/23  1351 07/04/23  1842 07/04/23  1831   BLOODCX  No growth at 24 hours No growth at 24 hours No growth at 5 days No growth at 5 days     2/23 RHC   Right heart catheterization  1. RA: mean 12 mmHg  2. RV: 65/4  3. PA: 70/22, mean 41 mmHg, TPG 26, PVR 7  4: PCWP: mean 15 mmHg  5: Saturations: Arterial 93.9%, PA 56.2%   6: Irma CO/CI: 3.7 L/min, 2.4  B. Opening arterial pressure: 116/45  C. LVEDP: 5  D. Aortic valve: has no significant disease       ECHO 2/23   SUMMARY:   - Left ventricle: The cavity size was normal. Wall thickness was normal.     Global systolic function is normal. The estimated ejection fraction is 65%.     Diastolic function assessment consistent with increased left atrial     pressure.   - Aortic valve: No significant regurgitation.   - Mitral valve: No significant regurgitation. Posterior MAC. MV repair. The     mean diastolic gradient is 9mm Hg, consistent with atleast moderate MS     (prior mean gradient 8mmHg).   - Left atrium: The atrium is dilated.   - Right ventricle: The cavity size is normal. Systolic function is normal.   - Right atrium: The atrium was dilated.   - Tricuspid valve: Moderate regurgitation.   - Pulmonary arteries: The peak systolic pressure is 65mm Hg.   - Pericardium: There is no pericardial effusion.            Result Review      I have personally reviewed the results from the time of this  admission to 7/11/2023 17:29 EDT and agree with these findings:  [x]  Laboratory  [x]  Microbiology  [x]  Radiology  []  EKG/Telemetry   [x]  Cardiology/Vascular   []  Pathology  []  Old records  []  Other:    ASSESSMENT  Preop risk assessment  Acute cholecystitis-awaiting cholecystectomy  ESRD on hemodialysis  COPD  Enlarging lung nodules-followed up by pulm at [facility]  Moderate pulmonary hypertension s/p RHC-2/23; mPAP 40 -  @ [facility]  Paroxysmal A-fib on AC; her  Type 1 diabetes  Anemia  CHF  S/p prior mitral valve repair with residual mod MS  S/p prior failed kidney transplant  Remote 30-pack-year smoker    RECOMMENDATIONS:  Patient is currently at her baseline health and she does likely have mild COPD and otherwise does not have any pulmonary pathology.  She does have significant valvular heart disease along with congestive heart failure requiring previous valve repair with residual moderate MS and most recent echocardiogram done in February 2023 is reviewed along with right heart catheterization.  She was diagnosed with moderate pulmonary hypertension and is being followed by her cardiologist.  Suspect this is related to left heart disease and patient has not been offered any pulmonary vasodilators by her cardiologist and at this point is optimized and I do not have any new recommendations and recommend to continue with outpatient work-up for the same in [facility].    Patient does not have any contraindication from a pulmonary standpoint for planned surgery.  She is mild to moderate risk from a pulmonary standpoint for planned procedure and obviously does have other risk factors from her baseline renal failure as well as cardiac issues complicating her care. Will rpt CXR to check for improvement since she got one in 7/5. She clinically appears euvolemic.     I have discussed the same with the patient.  She is as optimized as she can be at this point.    Patient with enlarging lung nodule  being managed by her pulmonologist and is recommended to continue with the same without fail     Thank you for letting me participate in the care of this pleasant patient.  I have discussed my findings and recommendations with patient, family, and nursing staff.     Cortes Hopper MD  7/11/2023  17:29 EDT

## 2023-07-11 NOTE — SIGNIFICANT NOTE
07/11/23 1335   OTHER   Discipline physical therapist   Rehab Time/Intention   Session Not Performed patient unavailable for evaluation  (pt off floor to dialysis this pm, will follow up tomorrow.)   Recommendation   PT - Next Appointment 07/12/23

## 2023-07-11 NOTE — NURSING NOTE
Dialysis complete, removed 1.7 liters with this treatment and no complications noted.  Pre and post vitals are in epic.

## 2023-07-11 NOTE — CONSULTS
[facility], [facility]  Initial Inpatient Consult Note    Referring Provider: Uche    Reason for Consultation: Evaluate for ERCP  Subjective     History of present illness:    78 y.o. female with a history of A-fib on anticoagulation, last dose Eliquis yesterday, chronic renal insufficiency/failure on hemodialysis, history of CVA, presents with abdominal pain.  Epigastric does not radiate worse with movement better at rest.  Nausea but no vomiting.  Never had pain like this before.  ER obtained CAT scan which can be reviewed below.  Gallbladder is intact.  Does have a history of colon cancer.  Status post partial colonic resection.    Past Medical History:  Past Medical History:   Diagnosis Date    Cancer     CHF (congestive heart failure)     Diabetes mellitus     DVT (deep venous thrombosis)     Gastroparesis     GERD (gastroesophageal reflux disease)     Hyperlipidemia     Hypertension     Kidney transplant failure     Neuropathy     Osteoporosis     Pulmonary nodules     Renal failure     Rheumatoid arthritis     Spinal stenosis     Stroke      Past Surgical History:  Past Surgical History:   Procedure Laterality Date    INCISION AND DRAINAGE LEG Left 2023    Procedure: LEFT INCISION AND DRAINAGE FOOT;  Surgeon: Justice Rosario MD;  Location: [facility];  Service: Vascular;  Laterality: Left;      Social History:   Social History     Tobacco Use    Smoking status: Former     Packs/day: 1.50     Years: 20.00     Pack years: 30.00     Types: Cigarettes     Quit date:      Years since quittin.5    Smokeless tobacco: Former   Substance Use Topics    Alcohol use: Yes     Alcohol/week: 1.0 standard drink     Types: 1 Shots of liquor per week     Comment: 1 conor every other week      Family History:  Family History   Problem Relation Age of Onset    Malig Hyperthermia Neg Hx        Home Meds:  Medications Prior to Admission   Medication Sig Dispense Refill Last Dose    albuterol  sulfate  (90 Base) MCG/ACT inhaler Inhale 2 puffs Every 4 (Four) Hours As Needed for Wheezing.   Past Week    apixaban (ELIQUIS) 2.5 MG tablet tablet Take 1 tablet by mouth Daily. Resume on Monday. (Patient taking differently: Take 1 tablet by mouth Every Night. Resume on Monday.) 60 tablet  7/9/2023    Biotin 92631 MCG tablet Take 1 tablet by mouth Daily.   7/9/2023    bumetanide (BUMEX) 1 MG tablet Take 1 tablet by mouth Daily.   7/9/2023    carvedilol (COREG) 6.25 MG tablet Take 1 tablet by mouth 2 (Two) Times a Day With Meals.   7/9/2023    cetirizine (zyrTEC) 10 MG tablet Take 1 tablet by mouth Daily.   7/9/2023    Cholecalciferol (Vitamin D3) 50 MCG (2000 UT) capsule Take 1 capsule by mouth Daily.   7/9/2023    ferrous sulfate 325 (65 FE) MG tablet Take 1 tablet by mouth Every Other Day.   7/9/2023    Insulin Aspart (novoLOG) 100 UNIT/ML injection Inject  under the skin into the appropriate area as directed 3 (Three) Times a Day Before Meals. 1 unit for every 50 above 150   7/10/2023    insulin glargine (LANTUS, SEMGLEE) 100 UNIT/ML injection Inject 11 Units under the skin into the appropriate area as directed Daily.   7/10/2023    Multiple Vitamins-Minerals (b complex-C-E-zinc) tablet Take 1 tablet by mouth Daily.   7/9/2023    omeprazole (PriLOSEC) 40 MG capsule Take 1 capsule by mouth daily. NEEDS APPT FOR FURTHER REFILLS (Patient taking differently: Take 20 mg by mouth Daily. NEEDS APPT FOR FURTHER REFILLS) 30 capsule 0 7/9/2023    Patiromer Sorbitex Calcium (VELTASSA PO) Take 8.4 g by mouth Every Other Day.   Past Week    pravastatin (PRAVACHOL) 40 MG tablet Take 1 tablet by mouth daily. (Patient taking differently: Take 1 tablet by mouth Every Night.) 30 tablet 3 7/9/2023    senna (SENOKOT) 8.6 MG tablet Take 1 tablet by mouth daily. 90 tablet 0 7/9/2023    insulin glargine (LANTUS, SEMGLEE) 100 UNIT/ML injection Inject 10 Units under the skin into the appropriate area as directed Every Night.         Current Meds:   bumetanide, 1 mg, Oral, Daily  cadexomer iodine, 1 application , Topical, Daily  carvedilol, 6.25 mg, Oral, BID With Meals  cholecalciferol, 2,000 Units, Oral, Daily  ferrous sulfate, 325 mg, Oral, Every Other Day  multivitamin with minerals, 1 tablet, Oral, Daily  piperacillin-tazobactam, 3.375 g, Intravenous, Once  piperacillin-tazobactam, 3.375 g, Intravenous, Q8H  senna-docusate sodium, 2 tablet, Oral, BID  sodium chloride, 10 mL, Intravenous, Q12H      Allergies:  Allergies   Allergen Reactions    Ativan [Lorazepam] Unknown - High Severity    Sulfa Antibiotics Nausea And Vomiting    Vancomycin Itching     Review of Systems  There is weakness of fatigue all other systems reviewed and negative     Objective     Vital Signs  Temp:  [97.6 °F (36.4 °C)-98.3 °F (36.8 °C)] 97.6 °F (36.4 °C)  Heart Rate:  [62-75] 62  Resp:  [16] 16  BP: (121-185)/(60-85) 145/67  Physical Exam:  General Appearance:    Alert, cooperative, in no acute distress   Head:    Normocephalic, without obvious abnormality, atraumatic   Eyes:          conjunctivae and sclerae normal, no   icterus   Throat:   no thrush, oral mucosa moist   Neck:   Supple, no adenopathy   Lungs:     Clear to auscultation bilaterally    Heart:    Regular rhythm and normal rate    Chest Wall:    No abnormalities observed   Abdomen:     Soft, nondistended, nontender; normal bowel sounds   Extremities:   no edema, no redness   Skin:   No bruising or rash   Psychiatric:  normal mood and insight     Results Review:   I reviewed the patient's new clinical results.    Results from last 7 days   Lab Units 07/11/23  0622 07/10/23  1043 07/06/23  0648   WBC 10*3/mm3 5.08 6.43 6.54   HEMOGLOBIN g/dL 9.7* 11.3* 10.2*   HEMATOCRIT % 30.2* 35.3 31.9*   PLATELETS 10*3/mm3 154 183 170     Results from last 7 days   Lab Units 07/11/23  0622 07/10/23  1043 07/06/23  0648 07/05/23  0939   SODIUM mmol/L 136 139 139 131*   POTASSIUM mmol/L 4.5 4.3 4.2 5.0   CHLORIDE  mmol/L 99 96* 100 93*   CO2 mmol/L 23.9 29.7* 25.6 23.2   BUN mg/dL 22 18 14 30*   CREATININE mg/dL 4.72* 3.61* 2.77* 4.95*   CALCIUM mg/dL 9.1 10.0 9.3 8.8   BILIRUBIN mg/dL 2.6* 1.3*  --  0.5   ALK PHOS U/L 989* 679*  --  96   ALT (SGPT) U/L 226* 132*  --  15   AST (SGOT) U/L 250* 48*  --  17   GLUCOSE mg/dL 79 147* 104* 112*         Lab Results   Lab Value Date/Time    LIPASE 38 07/10/2023 1043       Radiology:  CT Abdomen Pelvis Without Contrast   Preliminary Result   Limited in the absence of contrast and extensive artifact   from patient's bilateral hip hardware. There is distention of the   gallbladder with wall thickening and containing calculi. There is mild   intrahepatic and proximal common bile duct dilatation with suggestion of   choledocholithiasis. Correlation with serum bilirubin alkaline   phosphatase and further evaluation with MRCP as indicated.   2. CT findings of gastritis.   3. Constipation.       These findings were discussed with Dr. Corona by telephone at the time   of dictation.       Radiation dose reduction techniques were utilized, including automated   exposure control and exposure modulation based on body size.              US Abdomen Complete    (Results Pending)       Assessment & Plan   Active Hospital Problems    Diagnosis     **Acute cholecystitis     Diastolic CHF, chronic     Anemia     COPD (chronic obstructive pulmonary disease)     DM (diabetes mellitus)     ESRD (end stage renal disease)     History of colon cancer     HTN (hypertension)     PAF (paroxysmal atrial fibrillation)     Peripheral neuropathy        Assessment:  Abdominal pain  Elevated liver tests  Acute cholecystitis suspected  Possible choledocholithiasis-imaging unclear of this  End-stage renal disease on hemodialysis  A-fib on Eliquis last dose yesterday  Cardiac clearance obtained for surgery    Plan:  Patient tells me that she is scheduled for cholecystectomy tomorrow, I would imagine an IOC will be  obtained  I will do a ultrasound today to look for choledocholithiasis  Follow liver tests  Antibiotics per primary team  Clear liquid diet okay      I discussed the patients findings and my recommendations with patient and nursing staff.    Aren Mccoy MD

## 2023-07-11 NOTE — CONSULTS
"Nutrition Services    Patient Name:  Kalyn Mejia  YOB: 1945  MRN: [id number]  Admit Date:  7/10/2023    Assessment Date:  07/11/23    Comment: Nutrition consult due to MST score of 3 per nurse admission screen.  Admitted with abdominal pain - acute cholecystitis.  ESRD on HD, history of failed renal transplant.  Recent discharge from this facility after treatment of L plantar foot wound, which required debridement 7/5.    Weight appears fairly stable per chart weight history.    Currently on clear liquid diet with tentative plans for cholecystectomy tomorrow.    RD to follow along as diet is advanced.    CLINICAL NUTRITION ASSESSMENT      Reason for Assessment MST score 2+, Nurse or Nurse Practitioner Consult     Diagnosis/Problem     Acute cholecystitis    HTN (hypertension)    ESRD (end stage renal disease)    Peripheral neuropathy    DM (diabetes mellitus)    Anemia    COPD (chronic obstructive pulmonary disease)    PAF (paroxysmal atrial fibrillation)    History of colon cancer    Diastolic CHF, chronic     Medical/Surgical History Past Medical History:   Diagnosis Date    Cancer     CHF (congestive heart failure)     Diabetes mellitus     DVT (deep venous thrombosis)     Gastroparesis     GERD (gastroesophageal reflux disease)     Hyperlipidemia     Hypertension     Kidney transplant failure     Neuropathy     Osteoporosis     Pulmonary nodules     Renal failure     Rheumatoid arthritis     Spinal stenosis     Stroke        Past Surgical History:   Procedure Laterality Date    INCISION AND DRAINAGE LEG Left 7/5/2023    Procedure: LEFT INCISION AND DRAINAGE FOOT;  Surgeon: Justice Rosario MD;  Location: [facility];  Service: Vascular;  Laterality: Left;        Encounter Information        Nutrition History:     Food Preferences:    Supplements:    Factors Affecting Intake: abdominal pain     Anthropometrics        Current Height  Current Weight  BMI kg/m2 Height: 157.5 cm (62\")  Weight: "  (refused would like weight taken in the morning) (07/11/23 0033)  Body mass index is 22.31 kg/m².   Adjusted BMI (if applicable)    BMI Category Normal/Healthy (18.4 - 24.9)       Admission Weight 122 lb (7/10)       Ideal Body Weight (IBW) 110 lb (50.1 kg)   Adjusted IBW (if applicable)        Usual Body Weight (UBW) 127 lb (2/2022)   Weight Change/Trend Stable       Weight History Wt Readings from Last 30 Encounters:   07/10/23 1007 55.3 kg (122 lb)   07/04/23 1827 55.3 kg (122 lb)   12/29/15 1124 59.9 kg (132 lb 0.2 oz)   07/30/15 1214 57.2 kg (125 lb 15.9 oz)   02/27/15 1231 59.4 kg (131 lb)   09/18/14 1202 56.3 kg (124 lb 0.1 oz)   09/16/14 1451 57.2 kg (125 lb 15.9 oz)   08/25/14 1018 58.5 kg (128 lb 15.9 oz)   06/30/14 1111 59.4 kg (131 lb)   05/27/14 1344 63 kg (139 lb)   04/08/14 1441 59.4 kg (131 lb)   02/25/14 1154 60.3 kg (133 lb 0.1 oz)   11/12/13 1118 65.8 kg (144 lb 15.9 oz)   01/16/13 1315 59 kg (130 lb 0.1 oz)           --  Tests/Procedures        Tests/Procedures Dialysis, Other: plans for cholecystectomy tomorrow     Labs       Pertinent Labs    Results from last 7 days   Lab Units 07/11/23  0622 07/10/23  1043 07/06/23  0648 07/05/23  0939   SODIUM mmol/L 136 139 139 131*   POTASSIUM mmol/L 4.5 4.3 4.2 5.0   CHLORIDE mmol/L 99 96* 100 93*   CO2 mmol/L 23.9 29.7* 25.6 23.2   BUN mg/dL 22 18 14 30*   CREATININE mg/dL 4.72* 3.61* 2.77* 4.95*   CALCIUM mg/dL 9.1 10.0 9.3 8.8   BILIRUBIN mg/dL 2.6* 1.3*  --  0.5   ALK PHOS U/L 989* 679*  --  96   ALT (SGPT) U/L 226* 132*  --  15   AST (SGOT) U/L 250* 48*  --  17   GLUCOSE mg/dL 79 147* 104* 112*     Results from last 7 days   Lab Units 07/11/23  0622   MAGNESIUM mg/dL 2.3   PHOSPHORUS mg/dL 5.0*   HEMOGLOBIN g/dL 9.7*   HEMATOCRIT % 30.2*   WBC 10*3/mm3 5.08   ALBUMIN g/dL 3.0*     Results from last 7 days   Lab Units 07/11/23  0622 07/10/23  1043 07/06/23  0648 07/05/23  0645 07/04/23  1829   PLATELETS 10*3/mm3 154 183 170 203 246     No results  found for: COVID19  Lab Results   Component Value Date    HGBA1C 9.8 (H) 01/23/2022          Medications           Scheduled Medications bumetanide, 1 mg, Oral, Daily  cadexomer iodine, 1 application , Topical, Daily  carvedilol, 6.25 mg, Oral, BID With Meals  cholecalciferol, 2,000 Units, Oral, Daily  ferrous sulfate, 325 mg, Oral, Every Other Day  PATIENT SUPPLIED MEDICATION, 1 tablet, Oral, Daily  pravastatin, 40 mg, Oral, Nightly  senna-docusate sodium, 2 tablet, Oral, BID  sodium chloride, 10 mL, Intravenous, Q12H       Infusions     PRN Medications   acetaminophen **OR** acetaminophen **OR** acetaminophen    senna-docusate sodium **AND** polyethylene glycol **AND** bisacodyl **AND** bisacodyl    HYDROcodone-acetaminophen    HYDROmorphone    nitroglycerin    ondansetron    [COMPLETED] Insert Peripheral IV **AND** sodium chloride    sodium chloride    sodium chloride     Physical Findings          Physical Appearance alert, oriented, on oxygen therapy   Oral/Mouth Cavity other:dental appliance present   Edema  lower extremity , 1+ (trace)   Gastrointestinal abdominal pain, nausea, last bowel movement: 7/9   Skin  surgical incision L foot    Tubes/Drains/Lines none   NFPE Not indicated at this time   --  Current Nutrition Orders & Evaluation of Intake       Oral Nutrition     Food Allergies NKFA   Current PO Diet Diet: Liquid Diets; Clear Liquid; Texture: Regular Texture (IDDSI 7); Fluid Consistency: Thin (IDDSI 0)   Supplement n/a   PO Evaluation     % PO Intake No intake available    # of Days Evaluated    --  PES STATEMENT / NUTRITION DIAGNOSIS      Nutrition Dx Problem  Problem: Inadequate Oral Intake  Etiology: Medical Diagnosis acute cholecystitis   Signs/Symptoms: Clear Liquid Diet    Comment:    --  NUTRITION INTERVENTION / PLAN OF CARE      Intervention Goal(s) Maintain nutrition status, Reduce/improve symptoms, Disease management/therapy, Establish PO intake, Tolerate PO , Advance diet, and Maintain  weight         RD Intervention/Action Follow Tx Progress and Care plan reviewed         Prescription/Orders:       PO Diet       Supplements       Snacks       Enteral Nutrition       Parenteral Nutrition    New Prescription Ordered? No changes at this time   --      Monitor/Evaluation Per protocol, I&O, Pertinent labs, Weight, Skin status, GI status, Symptoms   Discharge Plan/Needs Pending clinical course   Education Will instruct as appropriate   --    RD to follow per protocol.      Electronically signed by:  Amalia Whitman RD  07/11/23 10:58 EDT

## 2023-07-11 NOTE — PROGRESS NOTES
[facility]  Progress note: 2023    Patient Identification:  Name:Kalyn Mejia  Age:78 y.o.  Sex: female  :  1945  MRN: [id number]           CC:  Paroxysmal atrial fibrillation, coronary disease, mitral valve disease    Interval history:  Blood pressure slightly high.  She denies any chest pain.  She states she always has shortness of breath especially when she lies flat.  Her dyspnea is quite significant when she is supine.  Abdominal pain has improved    Vital Signs:   Temp:  [97.6 °F (36.4 °C)-98.3 °F (36.8 °C)] 97.7 °F (36.5 °C)  Heart Rate:  [61-69] 61  Resp:  [16] 16  BP: (121-157)/(60-73) 157/73    Intake/Output Summary (Last 24 hours) at 2023 1400  Last data filed at 2023 0810  Gross per 24 hour   Intake 240 ml   Output --   Net 240 ml       Physical Examination:    General Appearance No acute distress   Neck No adenopathy, supple, trachea midline, no thyromegaly, no carotid bruit, JVP elevated   Lungs Clear to auscultation,respirations regular, even and unlabored   Heart Regular rhythm and normal rate, normal S1 and S2, 2/6 murmur, no gallop, no rub, no click   Chest wall No abnormalities observed   Abdomen Normal bowel sounds, no masses, no hepatomegaly, soft   Extremities No  edema, no cyanosis, no redness   Neurological Alert and oriented x 3     Lab Review:  Personally reviewed the labs, radiology imaging and other cardiac procedures.   Results from last 7 days   Lab Units 23  0622   SODIUM mmol/L 136   POTASSIUM mmol/L 4.5   CHLORIDE mmol/L 99   CO2 mmol/L 23.9   BUN mg/dL 22   CREATININE mg/dL 4.72*   CALCIUM mg/dL 9.1   BILIRUBIN mg/dL 2.6*   ALK PHOS U/L 989*   ALT (SGPT) U/L 226*   AST (SGOT) U/L 250*   GLUCOSE mg/dL 79     Results from last 7 days   Lab Units 07/10/23  1535 07/10/23  1351 07/10/23  1044   HSTROP T ng/L 209* 227* 225*     Results from last 7 days   Lab Units 23  0622 07/10/23  1043 23  0648   WBC 10*3/mm3 5.08 6.43 6.54    HEMOGLOBIN g/dL 9.7* 11.3* 10.2*   HEMATOCRIT % 30.2* 35.3 31.9*   PLATELETS 10*3/mm3 154 183 170         Medication Review:   Meds reviewed  Scheduled Meds:bumetanide, 1 mg, Oral, Daily  cadexomer iodine, 1 application , Topical, Daily  carvedilol, 6.25 mg, Oral, BID With Meals  cholecalciferol, 2,000 Units, Oral, Daily  epoetin joceline/joceline-epbx, 6,000 Units, Subcutaneous, Once  ferrous sulfate, 325 mg, Oral, Every Other Day  multivitamin with minerals, 1 tablet, Oral, Daily  piperacillin-tazobactam, 3.375 g, Intravenous, Once  piperacillin-tazobactam, 3.375 g, Intravenous, Q8H  senna-docusate sodium, 2 tablet, Oral, BID  sodium chloride, 10 mL, Intravenous, Q12H      I personally viewed and interpreted the patient's EKG/Telemetry data    Assessment and Plan  1.  Acute cholecystitis, with plans for cholecystectomy tomorrow and remains on IV antibiotics.  We will check an urgent echocardiogram in a.m.  As long as pulmonary pressures have improved and no significant valvular stenosis, reasonable to proceed though would ask pulmonary service to review to address pulmonary toilet given severe pulmonary hypertension noted elsewhere.  2.  Elevated troponin this is flat and likely due to underlying renal disease and infectious process.  Await echo..   3.  Paroxysmal atrial fibrillation.  Currently in sinus rhythm.  Eliquis on hold  4.  Mitral valve regurgitation, status post repair with a residual gradient/stenosis of 9mmhg by outside echo 3/2023.  We will need to avoid tachycardia.  5.  Coronary artery disease with modest LAD stenosis by cath 2/2023.  No anginal symptoms  6.  CHF, diuresis per nephrology with dialysis  7.  Severe pulmonary hypertension.  Mean PA gradient of 41 mmHg noted by cath 2/2023 and by echo RVSP 65 mmHg.  8.  Hypertension controlled  9.  End-stage renal disease, on dialysis  10.  Diabetes  11.  Recent debridement of large callus.    Mini Nickerson  7/11/202314:00 EDT  45min spent in reviewing records,  discussion and examination of the patient and discussion with other members of the patient's medical team.     Dictated utilizing Dragon dictation

## 2023-07-12 ENCOUNTER — APPOINTMENT (OUTPATIENT)
Dept: GENERAL RADIOLOGY | Facility: HOSPITAL | Age: 78
DRG: 417 | End: 2023-07-12
Payer: MEDICARE

## 2023-07-12 ENCOUNTER — APPOINTMENT (OUTPATIENT)
Dept: CARDIOLOGY | Facility: HOSPITAL | Age: 78
DRG: 417 | End: 2023-07-12
Payer: MEDICARE

## 2023-07-12 LAB
ALBUMIN SERPL-MCNC: 3.2 G/DL (ref 3.5–5.2)
ALBUMIN/GLOB SERPL: 1.2 G/DL
ALP SERPL-CCNC: 879 U/L (ref 39–117)
ALT SERPL W P-5'-P-CCNC: 181 U/L (ref 1–33)
ANION GAP SERPL CALCULATED.3IONS-SCNC: 17.2 MMOL/L (ref 5–15)
ANISOCYTOSIS BLD QL: ABNORMAL
AORTIC DIMENSIONLESS INDEX: 0.7 (DI)
AST SERPL-CCNC: 99 U/L (ref 1–32)
BASOPHILS # BLD MANUAL: 0.1 10*3/MM3 (ref 0–0.2)
BASOPHILS NFR BLD MANUAL: 1 % (ref 0–1.5)
BH CV ECHO MEAS - ACS: 1.57 CM
BH CV ECHO MEAS - AO MAX PG: 11.7 MMHG
BH CV ECHO MEAS - AO MEAN PG: 5.7 MMHG
BH CV ECHO MEAS - AO ROOT DIAM: 2.8 CM
BH CV ECHO MEAS - AO V2 MAX: 171.3 CM/SEC
BH CV ECHO MEAS - AO V2 VTI: 36.1 CM
BH CV ECHO MEAS - AVA(I,D): 1.91 CM2
BH CV ECHO MEAS - EDV(CUBED): 77.8 ML
BH CV ECHO MEAS - EDV(MOD-SP2): 55 ML
BH CV ECHO MEAS - EDV(MOD-SP4): 58 ML
BH CV ECHO MEAS - EF(MOD-BP): 73.1 %
BH CV ECHO MEAS - EF(MOD-SP2): 69.1 %
BH CV ECHO MEAS - EF(MOD-SP4): 75.9 %
BH CV ECHO MEAS - ESV(CUBED): 19.3 ML
BH CV ECHO MEAS - ESV(MOD-SP2): 17 ML
BH CV ECHO MEAS - ESV(MOD-SP4): 14 ML
BH CV ECHO MEAS - FS: 37.2 %
BH CV ECHO MEAS - IVS/LVPW: 1.08 CM
BH CV ECHO MEAS - IVSD: 0.99 CM
BH CV ECHO MEAS - LAT PEAK E' VEL: 5.4 CM/SEC
BH CV ECHO MEAS - LV DIASTOLIC VOL/BSA (35-75): 36.2 CM2
BH CV ECHO MEAS - LV MASS(C)D: 132.5 GRAMS
BH CV ECHO MEAS - LV MAX PG: 6 MMHG
BH CV ECHO MEAS - LV MEAN PG: 3.1 MMHG
BH CV ECHO MEAS - LV SYSTOLIC VOL/BSA (12-30): 8.7 CM2
BH CV ECHO MEAS - LV V1 MAX: 122.7 CM/SEC
BH CV ECHO MEAS - LV V1 VTI: 27 CM
BH CV ECHO MEAS - LVIDD: 4.3 CM
BH CV ECHO MEAS - LVIDS: 2.7 CM
BH CV ECHO MEAS - LVOT AREA: 2.5 CM2
BH CV ECHO MEAS - LVOT DIAM: 1.8 CM
BH CV ECHO MEAS - LVPWD: 0.92 CM
BH CV ECHO MEAS - MED PEAK E' VEL: 3.2 CM/SEC
BH CV ECHO MEAS - MV A DUR: 0.12 SEC
BH CV ECHO MEAS - MV A MAX VEL: 94.1 CM/SEC
BH CV ECHO MEAS - MV DEC SLOPE: 351.3 CM/SEC2
BH CV ECHO MEAS - MV DEC TIME: 484 MSEC
BH CV ECHO MEAS - MV E MAX VEL: 135 CM/SEC
BH CV ECHO MEAS - MV E/A: 1.43
BH CV ECHO MEAS - MV MAX PG: 13.5 MMHG
BH CV ECHO MEAS - MV MEAN PG: 4.9 MMHG
BH CV ECHO MEAS - MV P1/2T: 164.4 MSEC
BH CV ECHO MEAS - MV V2 VTI: 58.5 CM
BH CV ECHO MEAS - MVA(P1/2T): 1.34 CM2
BH CV ECHO MEAS - MVA(VTI): 1.18 CM2
BH CV ECHO MEAS - PA ACC TIME: 0.1 SEC
BH CV ECHO MEAS - PA V2 MAX: 101 CM/SEC
BH CV ECHO MEAS - PI END-D VEL: 119.4 CM/SEC
BH CV ECHO MEAS - PULM A REVS DUR: 0.13 SEC
BH CV ECHO MEAS - PULM A REVS VEL: 37.6 CM/SEC
BH CV ECHO MEAS - PULM DIAS VEL: 44.1 CM/SEC
BH CV ECHO MEAS - PULM S/D: 0.57
BH CV ECHO MEAS - PULM SYS VEL: 25.3 CM/SEC
BH CV ECHO MEAS - QP/QS: 0.94
BH CV ECHO MEAS - RAP SYSTOLE: 15 MMHG
BH CV ECHO MEAS - RV MAX PG: 1.95 MMHG
BH CV ECHO MEAS - RV V1 MAX: 69.8 CM/SEC
BH CV ECHO MEAS - RV V1 VTI: 16.6 CM
BH CV ECHO MEAS - RVOT DIAM: 2.23 CM
BH CV ECHO MEAS - RVSP: 71 MMHG
BH CV ECHO MEAS - SI(MOD-SP2): 23.7 ML/M2
BH CV ECHO MEAS - SI(MOD-SP4): 27.5 ML/M2
BH CV ECHO MEAS - SV(LVOT): 68.9 ML
BH CV ECHO MEAS - SV(MOD-SP2): 38 ML
BH CV ECHO MEAS - SV(MOD-SP4): 44 ML
BH CV ECHO MEAS - SV(RVOT): 65 ML
BH CV ECHO MEAS - TAPSE (>1.6): 1.5 CM
BH CV ECHO MEAS - TR MAX PG: 56 MMHG
BH CV ECHO MEAS - TR MAX VEL: 374 CM/SEC
BH CV ECHO MEASUREMENTS AVERAGE E/E' RATIO: 31.4
BH CV XLRA - RV BASE: 4 CM
BH CV XLRA - RV LENGTH: 7 CM
BH CV XLRA - RV MID: 3.4 CM
BH CV XLRA - TDI S': 7.9 CM/SEC
BILIRUB SERPL-MCNC: 5.8 MG/DL (ref 0–1.2)
BUN SERPL-MCNC: 18 MG/DL (ref 8–23)
BUN/CREAT SERPL: 5.5 (ref 7–25)
CALCIUM SPEC-SCNC: 9 MG/DL (ref 8.6–10.5)
CHLORIDE SERPL-SCNC: 96 MMOL/L (ref 98–107)
CO2 SERPL-SCNC: 21.8 MMOL/L (ref 22–29)
CREAT SERPL-MCNC: 3.25 MG/DL (ref 0.57–1)
DEPRECATED RDW RBC AUTO: 47.4 FL (ref 37–54)
EGFRCR SERPLBLD CKD-EPI 2021: 14 ML/MIN/1.73
EOSINOPHIL # BLD MANUAL: 0.1 10*3/MM3 (ref 0–0.4)
EOSINOPHIL NFR BLD MANUAL: 1 % (ref 0.3–6.2)
ERYTHROCYTE [DISTWIDTH] IN BLOOD BY AUTOMATED COUNT: 14.5 % (ref 12.3–15.4)
GLOBULIN UR ELPH-MCNC: 2.7 GM/DL
GLUCOSE BLDC GLUCOMTR-MCNC: 257 MG/DL (ref 70–130)
GLUCOSE BLDC GLUCOMTR-MCNC: 268 MG/DL (ref 70–130)
GLUCOSE BLDC GLUCOMTR-MCNC: 322 MG/DL (ref 70–130)
GLUCOSE BLDC GLUCOMTR-MCNC: 323 MG/DL (ref 70–130)
GLUCOSE BLDC GLUCOMTR-MCNC: 334 MG/DL (ref 70–130)
GLUCOSE BLDC GLUCOMTR-MCNC: 361 MG/DL (ref 70–130)
GLUCOSE SERPL-MCNC: 298 MG/DL (ref 65–99)
HCT VFR BLD AUTO: 31.2 % (ref 34–46.6)
HGB BLD-MCNC: 9.9 G/DL (ref 12–15.9)
LEFT ATRIUM VOLUME INDEX: 38.4 ML/M2
LYMPHOCYTES # BLD MANUAL: 0.2 10*3/MM3 (ref 0.7–3.1)
LYMPHOCYTES NFR BLD MANUAL: 11 % (ref 5–12)
MCH RBC QN AUTO: 28.7 PG (ref 26.6–33)
MCHC RBC AUTO-ENTMCNC: 31.7 G/DL (ref 31.5–35.7)
MCV RBC AUTO: 90.4 FL (ref 79–97)
MONOCYTES # BLD: 1.11 10*3/MM3 (ref 0.1–0.9)
NEUTROPHILS # BLD AUTO: 8.58 10*3/MM3 (ref 1.7–7)
NEUTROPHILS NFR BLD MANUAL: 85 % (ref 42.7–76)
NRBC BLD AUTO-RTO: 0 /100 WBC (ref 0–0.2)
PLAT MORPH BLD: NORMAL
PLATELET # BLD AUTO: 159 10*3/MM3 (ref 140–450)
PMV BLD AUTO: 11 FL (ref 6–12)
POTASSIUM SERPL-SCNC: 4.3 MMOL/L (ref 3.5–5.2)
PROT SERPL-MCNC: 5.9 G/DL (ref 6–8.5)
RBC # BLD AUTO: 3.45 10*6/MM3 (ref 3.77–5.28)
SINUS: 2.7 CM
SODIUM SERPL-SCNC: 135 MMOL/L (ref 136–145)
STJ: 2.33 CM
VARIANT LYMPHS NFR BLD MANUAL: 2 % (ref 19.6–45.3)
WBC MORPH BLD: NORMAL
WBC NRBC COR # BLD: 10.09 10*3/MM3 (ref 3.4–10.8)

## 2023-07-12 PROCEDURE — 25010000002 FENTANYL CITRATE (PF) 50 MCG/ML SOLUTION: Performed by: NURSE ANESTHETIST, CERTIFIED REGISTERED

## 2023-07-12 PROCEDURE — 99232 SBSQ HOSP IP/OBS MODERATE 35: CPT | Performed by: INTERNAL MEDICINE

## 2023-07-12 PROCEDURE — C1758 CATHETER, URETERAL: HCPCS | Performed by: SURGERY

## 2023-07-12 PROCEDURE — 25010000002 HYDROMORPHONE PER 4 MG: Performed by: SURGERY

## 2023-07-12 PROCEDURE — 74300 X-RAY BILE DUCTS/PANCREAS: CPT

## 2023-07-12 PROCEDURE — 25010000002 INDOCYANINE GREEN 25 MG RECONSTITUTED SOLUTION: Performed by: SURGERY

## 2023-07-12 PROCEDURE — 8E0W4CZ ROBOTIC ASSISTED PROCEDURE OF TRUNK REGION, PERCUTANEOUS ENDOSCOPIC APPROACH: ICD-10-PCS | Performed by: SURGERY

## 2023-07-12 PROCEDURE — 25010000002 HYDROMORPHONE PER 4 MG: Performed by: NURSE ANESTHETIST, CERTIFIED REGISTERED

## 2023-07-12 PROCEDURE — 63710000001 INSULIN LISPRO (HUMAN) PER 5 UNITS: Performed by: SURGERY

## 2023-07-12 PROCEDURE — 63710000001 INSULIN LISPRO (HUMAN) PER 5 UNITS: Performed by: STUDENT IN AN ORGANIZED HEALTH CARE EDUCATION/TRAINING PROGRAM

## 2023-07-12 PROCEDURE — 85007 BL SMEAR W/DIFF WBC COUNT: CPT | Performed by: INTERNAL MEDICINE

## 2023-07-12 PROCEDURE — 25010000002 PIPERACILLIN SOD-TAZOBACTAM PER 1 G: Performed by: STUDENT IN AN ORGANIZED HEALTH CARE EDUCATION/TRAINING PROGRAM

## 2023-07-12 PROCEDURE — 25010000002 MIDAZOLAM PER 1 MG: Performed by: ANESTHESIOLOGY

## 2023-07-12 PROCEDURE — 0FT44ZZ RESECTION OF GALLBLADDER, PERCUTANEOUS ENDOSCOPIC APPROACH: ICD-10-PCS | Performed by: SURGERY

## 2023-07-12 PROCEDURE — 85025 COMPLETE CBC W/AUTO DIFF WBC: CPT | Performed by: INTERNAL MEDICINE

## 2023-07-12 PROCEDURE — 82948 REAGENT STRIP/BLOOD GLUCOSE: CPT

## 2023-07-12 PROCEDURE — 93306 TTE W/DOPPLER COMPLETE: CPT

## 2023-07-12 PROCEDURE — BF532Z0 OTHER IMAGING OF GALLBLADDER AND BILE DUCTS USING FLUORESCING AGENT, INTRAOPERATIVE: ICD-10-PCS | Performed by: SURGERY

## 2023-07-12 PROCEDURE — 63710000001 PROMETHAZINE PER 25 MG: Performed by: NURSE ANESTHETIST, CERTIFIED REGISTERED

## 2023-07-12 PROCEDURE — 88304 TISSUE EXAM BY PATHOLOGIST: CPT | Performed by: SURGERY

## 2023-07-12 PROCEDURE — 93306 TTE W/DOPPLER COMPLETE: CPT | Performed by: INTERNAL MEDICINE

## 2023-07-12 PROCEDURE — 47563 LAPARO CHOLECYSTECTOMY/GRAPH: CPT | Performed by: PHYSICIAN ASSISTANT

## 2023-07-12 PROCEDURE — 47563 LAPARO CHOLECYSTECTOMY/GRAPH: CPT | Performed by: SURGERY

## 2023-07-12 PROCEDURE — 25510000001 IOPAMIDOL 61 % SOLUTION: Performed by: SURGERY

## 2023-07-12 PROCEDURE — 80053 COMPREHEN METABOLIC PANEL: CPT | Performed by: INTERNAL MEDICINE

## 2023-07-12 PROCEDURE — 25010000002 ONDANSETRON PER 1 MG: Performed by: NURSE ANESTHETIST, CERTIFIED REGISTERED

## 2023-07-12 DEVICE — MEDIUM-LARGE LIGATION CLIPS 6 CLIPS/CART
Type: IMPLANTABLE DEVICE | Site: ABDOMEN | Status: FUNCTIONAL
Brand: VAS-Q-CLIP

## 2023-07-12 RX ORDER — PROMETHAZINE HYDROCHLORIDE 25 MG/1
25 TABLET ORAL ONCE AS NEEDED
Status: COMPLETED | OUTPATIENT
Start: 2023-07-12 | End: 2023-07-12

## 2023-07-12 RX ORDER — INSULIN LISPRO 100 [IU]/ML
2-7 INJECTION, SOLUTION INTRAVENOUS; SUBCUTANEOUS
Status: DISCONTINUED | OUTPATIENT
Start: 2023-07-12 | End: 2023-07-18 | Stop reason: HOSPADM

## 2023-07-12 RX ORDER — HYDROCODONE BITARTRATE AND ACETAMINOPHEN 5; 325 MG/1; MG/1
1 TABLET ORAL ONCE AS NEEDED
Status: DISCONTINUED | OUTPATIENT
Start: 2023-07-12 | End: 2023-07-12 | Stop reason: HOSPADM

## 2023-07-12 RX ORDER — DROPERIDOL 2.5 MG/ML
0.62 INJECTION, SOLUTION INTRAMUSCULAR; INTRAVENOUS
Status: DISCONTINUED | OUTPATIENT
Start: 2023-07-12 | End: 2023-07-12 | Stop reason: HOSPADM

## 2023-07-12 RX ORDER — SODIUM CHLORIDE 0.9 % (FLUSH) 0.9 %
3-10 SYRINGE (ML) INJECTION AS NEEDED
Status: DISCONTINUED | OUTPATIENT
Start: 2023-07-12 | End: 2023-07-12 | Stop reason: HOSPADM

## 2023-07-12 RX ORDER — NICOTINE POLACRILEX 4 MG
15 LOZENGE BUCCAL
Status: DISCONTINUED | OUTPATIENT
Start: 2023-07-12 | End: 2023-07-18 | Stop reason: HOSPADM

## 2023-07-12 RX ORDER — PROMETHAZINE HYDROCHLORIDE 25 MG/1
25 SUPPOSITORY RECTAL ONCE AS NEEDED
Status: COMPLETED | OUTPATIENT
Start: 2023-07-12 | End: 2023-07-12

## 2023-07-12 RX ORDER — ONDANSETRON 2 MG/ML
4 INJECTION INTRAMUSCULAR; INTRAVENOUS ONCE AS NEEDED
Status: COMPLETED | OUTPATIENT
Start: 2023-07-12 | End: 2023-07-12

## 2023-07-12 RX ORDER — FAMOTIDINE 10 MG/ML
20 INJECTION, SOLUTION INTRAVENOUS ONCE
Status: COMPLETED | OUTPATIENT
Start: 2023-07-12 | End: 2023-07-12

## 2023-07-12 RX ORDER — SODIUM CHLORIDE 9 MG/ML
9 INJECTION, SOLUTION INTRAVENOUS CONTINUOUS PRN
Status: DISCONTINUED | OUTPATIENT
Start: 2023-07-12 | End: 2023-07-18 | Stop reason: HOSPADM

## 2023-07-12 RX ORDER — FENTANYL CITRATE 50 UG/ML
50 INJECTION, SOLUTION INTRAMUSCULAR; INTRAVENOUS ONCE AS NEEDED
Status: COMPLETED | OUTPATIENT
Start: 2023-07-12 | End: 2023-07-12

## 2023-07-12 RX ORDER — DEXTROSE MONOHYDRATE 25 G/50ML
25 INJECTION, SOLUTION INTRAVENOUS
Status: DISCONTINUED | OUTPATIENT
Start: 2023-07-12 | End: 2023-07-18 | Stop reason: HOSPADM

## 2023-07-12 RX ORDER — EPHEDRINE SULFATE 50 MG/ML
5 INJECTION, SOLUTION INTRAVENOUS ONCE AS NEEDED
Status: DISCONTINUED | OUTPATIENT
Start: 2023-07-12 | End: 2023-07-12 | Stop reason: HOSPADM

## 2023-07-12 RX ORDER — SODIUM CHLORIDE 9 MG/ML
40 INJECTION, SOLUTION INTRAVENOUS AS NEEDED
Status: DISCONTINUED | OUTPATIENT
Start: 2023-07-12 | End: 2023-07-12 | Stop reason: HOSPADM

## 2023-07-12 RX ORDER — HYDROCODONE BITARTRATE AND ACETAMINOPHEN 7.5; 325 MG/1; MG/1
1 TABLET ORAL EVERY 4 HOURS PRN
Status: DISCONTINUED | OUTPATIENT
Start: 2023-07-12 | End: 2023-07-12 | Stop reason: HOSPADM

## 2023-07-12 RX ORDER — LOPERAMIDE HYDROCHLORIDE 2 MG/1
2 CAPSULE ORAL 4 TIMES DAILY PRN
Status: DISCONTINUED | OUTPATIENT
Start: 2023-07-12 | End: 2023-07-18 | Stop reason: HOSPADM

## 2023-07-12 RX ORDER — SODIUM CHLORIDE 0.9 % (FLUSH) 0.9 %
3 SYRINGE (ML) INJECTION EVERY 12 HOURS SCHEDULED
Status: DISCONTINUED | OUTPATIENT
Start: 2023-07-12 | End: 2023-07-12 | Stop reason: HOSPADM

## 2023-07-12 RX ORDER — SODIUM CHLORIDE 0.9 % (FLUSH) 0.9 %
10 SYRINGE (ML) INJECTION EVERY 12 HOURS SCHEDULED
Status: DISCONTINUED | OUTPATIENT
Start: 2023-07-12 | End: 2023-07-12 | Stop reason: HOSPADM

## 2023-07-12 RX ORDER — HYDRALAZINE HYDROCHLORIDE 20 MG/ML
5 INJECTION INTRAMUSCULAR; INTRAVENOUS
Status: DISCONTINUED | OUTPATIENT
Start: 2023-07-12 | End: 2023-07-12 | Stop reason: HOSPADM

## 2023-07-12 RX ORDER — NALOXONE HCL 0.4 MG/ML
0.2 VIAL (ML) INJECTION AS NEEDED
Status: DISCONTINUED | OUTPATIENT
Start: 2023-07-12 | End: 2023-07-12 | Stop reason: HOSPADM

## 2023-07-12 RX ORDER — LABETALOL HYDROCHLORIDE 5 MG/ML
5 INJECTION, SOLUTION INTRAVENOUS
Status: DISCONTINUED | OUTPATIENT
Start: 2023-07-12 | End: 2023-07-12 | Stop reason: HOSPADM

## 2023-07-12 RX ORDER — BUPIVACAINE HYDROCHLORIDE AND EPINEPHRINE 5; 5 MG/ML; UG/ML
INJECTION, SOLUTION EPIDURAL; INTRACAUDAL; PERINEURAL AS NEEDED
Status: DISCONTINUED | OUTPATIENT
Start: 2023-07-12 | End: 2023-07-12 | Stop reason: HOSPADM

## 2023-07-12 RX ORDER — INSULIN LISPRO 100 [IU]/ML
2 INJECTION, SOLUTION INTRAVENOUS; SUBCUTANEOUS ONCE
Status: COMPLETED | OUTPATIENT
Start: 2023-07-12 | End: 2023-07-12

## 2023-07-12 RX ORDER — DIPHENHYDRAMINE HYDROCHLORIDE 50 MG/ML
12.5 INJECTION INTRAMUSCULAR; INTRAVENOUS
Status: DISCONTINUED | OUTPATIENT
Start: 2023-07-12 | End: 2023-07-12 | Stop reason: HOSPADM

## 2023-07-12 RX ORDER — LIDOCAINE HYDROCHLORIDE 10 MG/ML
0.5 INJECTION, SOLUTION EPIDURAL; INFILTRATION; INTRACAUDAL; PERINEURAL ONCE AS NEEDED
Status: DISCONTINUED | OUTPATIENT
Start: 2023-07-12 | End: 2023-07-12 | Stop reason: HOSPADM

## 2023-07-12 RX ORDER — SODIUM CHLORIDE 9 MG/ML
INJECTION, SOLUTION INTRAVENOUS AS NEEDED
Status: DISCONTINUED | OUTPATIENT
Start: 2023-07-12 | End: 2023-07-12 | Stop reason: HOSPADM

## 2023-07-12 RX ORDER — INDOCYANINE GREEN AND WATER 25 MG
2.5 KIT INJECTION ONCE
Status: COMPLETED | OUTPATIENT
Start: 2023-07-12 | End: 2023-07-12

## 2023-07-12 RX ORDER — IBUPROFEN 600 MG/1
1 TABLET ORAL
Status: DISCONTINUED | OUTPATIENT
Start: 2023-07-12 | End: 2023-07-18 | Stop reason: HOSPADM

## 2023-07-12 RX ORDER — SODIUM CHLORIDE 0.9 % (FLUSH) 0.9 %
10 SYRINGE (ML) INJECTION AS NEEDED
Status: DISCONTINUED | OUTPATIENT
Start: 2023-07-12 | End: 2023-07-12 | Stop reason: HOSPADM

## 2023-07-12 RX ORDER — FENTANYL CITRATE 50 UG/ML
25 INJECTION, SOLUTION INTRAMUSCULAR; INTRAVENOUS
Status: DISCONTINUED | OUTPATIENT
Start: 2023-07-12 | End: 2023-07-12 | Stop reason: HOSPADM

## 2023-07-12 RX ORDER — FLUMAZENIL 0.1 MG/ML
0.2 INJECTION INTRAVENOUS AS NEEDED
Status: DISCONTINUED | OUTPATIENT
Start: 2023-07-12 | End: 2023-07-12 | Stop reason: HOSPADM

## 2023-07-12 RX ORDER — HYDROMORPHONE HYDROCHLORIDE 1 MG/ML
0.25 INJECTION, SOLUTION INTRAMUSCULAR; INTRAVENOUS; SUBCUTANEOUS
Status: DISCONTINUED | OUTPATIENT
Start: 2023-07-12 | End: 2023-07-12 | Stop reason: HOSPADM

## 2023-07-12 RX ORDER — IPRATROPIUM BROMIDE AND ALBUTEROL SULFATE 2.5; .5 MG/3ML; MG/3ML
3 SOLUTION RESPIRATORY (INHALATION) ONCE AS NEEDED
Status: DISCONTINUED | OUTPATIENT
Start: 2023-07-12 | End: 2023-07-12 | Stop reason: HOSPADM

## 2023-07-12 RX ORDER — MIDAZOLAM HYDROCHLORIDE 1 MG/ML
0.5 INJECTION INTRAMUSCULAR; INTRAVENOUS
Status: DISCONTINUED | OUTPATIENT
Start: 2023-07-12 | End: 2023-07-12 | Stop reason: HOSPADM

## 2023-07-12 RX ADMIN — MIDAZOLAM 0.5 MG: 1 INJECTION INTRAMUSCULAR; INTRAVENOUS at 15:46

## 2023-07-12 RX ADMIN — PROMETHAZINE HYDROCHLORIDE 25 MG: 25 TABLET ORAL at 19:03

## 2023-07-12 RX ADMIN — Medication 10 ML: at 10:22

## 2023-07-12 RX ADMIN — BUMETANIDE 1 MG: 1 TABLET ORAL at 13:20

## 2023-07-12 RX ADMIN — INDOCYANINE GREEN AND WATER 2.5 MG: KIT at 15:25

## 2023-07-12 RX ADMIN — FAMOTIDINE 20 MG: 10 INJECTION INTRAVENOUS at 15:46

## 2023-07-12 RX ADMIN — PIPERACILLIN SODIUM AND TAZOBACTAM SODIUM 3.38 G: 3; .375 INJECTION, SOLUTION INTRAVENOUS at 15:58

## 2023-07-12 RX ADMIN — INSULIN LISPRO 2 UNITS: 100 INJECTION, SOLUTION INTRAVENOUS; SUBCUTANEOUS at 13:09

## 2023-07-12 RX ADMIN — HYDROMORPHONE HYDROCHLORIDE 0.25 MG: 1 INJECTION, SOLUTION INTRAMUSCULAR; INTRAVENOUS; SUBCUTANEOUS at 18:09

## 2023-07-12 RX ADMIN — PIPERACILLIN SODIUM AND TAZOBACTAM SODIUM 3.38 G: 3; .375 INJECTION, SOLUTION INTRAVENOUS at 04:56

## 2023-07-12 RX ADMIN — FENTANYL CITRATE 25 MCG: 50 INJECTION, SOLUTION INTRAMUSCULAR; INTRAVENOUS at 18:05

## 2023-07-12 RX ADMIN — HYDROMORPHONE HYDROCHLORIDE 0.5 MG: 1 INJECTION, SOLUTION INTRAMUSCULAR; INTRAVENOUS; SUBCUTANEOUS at 21:29

## 2023-07-12 RX ADMIN — FENTANYL CITRATE 25 MCG: 50 INJECTION, SOLUTION INTRAMUSCULAR; INTRAVENOUS at 17:59

## 2023-07-12 RX ADMIN — INSULIN LISPRO 4 UNITS: 100 INJECTION, SOLUTION INTRAVENOUS; SUBCUTANEOUS at 20:41

## 2023-07-12 RX ADMIN — SODIUM CHLORIDE 9 ML/HR: 9 INJECTION, SOLUTION INTRAVENOUS at 15:47

## 2023-07-12 RX ADMIN — ONDANSETRON 4 MG: 2 INJECTION INTRAMUSCULAR; INTRAVENOUS at 18:50

## 2023-07-12 NOTE — PROGRESS NOTES
[facility]  Inpatient Progress Note    Reason for Follow Up:  Evaluate for ERCP     Subjective     Interval History:   She has less abdominal pain and is anxious to have cholecystectomy done today.  Her total bilirubin is 5.8 today with an ALT of 181 and alkaline phosphatase of 870 her ultrasound of the gallbladder shows gallstones and sludge bladder.  Only a portion of the common hepatic duct and common bile duct was seen and was dilated.  There was some sludge in the bile duct but no discrete stones in the bile ducts.    Current Facility-Administered Medications:     acetaminophen (TYLENOL) tablet 650 mg, 650 mg, Oral, Q4H PRN **OR** acetaminophen (TYLENOL) 160 MG/5ML solution 650 mg, 650 mg, Oral, Q4H PRN **OR** acetaminophen (TYLENOL) suppository 650 mg, 650 mg, Rectal, Q4H PRN, Judy Deng APRN    sennosides-docusate (PERICOLACE) 8.6-50 MG per tablet 2 tablet, 2 tablet, Oral, BID, 2 tablet at 07/11/23 0809 **AND** polyethylene glycol (MIRALAX) packet 17 g, 17 g, Oral, Daily PRN **AND** bisacodyl (DULCOLAX) EC tablet 5 mg, 5 mg, Oral, Daily PRN **AND** bisacodyl (DULCOLAX) suppository 10 mg, 10 mg, Rectal, Daily PRN, Judy Deng APRN    bumetanide (BUMEX) tablet 1 mg, 1 mg, Oral, Daily, Judy Deng APRN, 1 mg at 07/11/23 0809    cadexomer iodine (IODOSORB) 0.9 % gel 1 application , 1 application , Topical, Daily, James Kowalski MD, 1 application  at 07/11/23 1717    cholecalciferol (VITAMIN D3) tablet 2,000 Units, 2,000 Units, Oral, Daily, James Kowalski MD, 2,000 Units at 07/11/23 0809    dextrose (D50W) (25 g/50 mL) IV injection 25 g, 25 g, Intravenous, Q15 Min PRN, Leola Chacon MD, 25 g at 07/11/23 2104    dextrose (D50W) (25 g/50 mL) IV injection 25 g, 25 g, Intravenous, Q15 Min PRN, Leola Chacon MD    dextrose (GLUTOSE) oral gel 15 g, 15 g, Oral, Q15 Min PRN, Leola Chacon MD    dextrose (GLUTOSE) oral gel 15 g, 15 g,  Oral, Q15 Min PRN, Leola Chacon MD    [START ON 7/13/2023] epoetin joceline-epbx (RETACRIT) injection 5,000 Units, 5,000 Units, Intravenous, Once per day on Tue Thu Carly Andrea Andrew James, MD    ferrous sulfate tablet 325 mg, 325 mg, Oral, Every Other Day, Judy Deng APRN, 325 mg at 07/10/23 1747    glucagon (GLUCAGEN) injection 1 mg, 1 mg, Subcutaneous, Q15 Min PRN, Leola Chacon MD    glucagon (GLUCAGEN) injection 1 mg, 1 mg, Intramuscular, Q15 Min PRN, Leola Chacon MD    HYDROcodone-acetaminophen (NORCO) 7.5-325 MG per tablet 1 tablet, 1 tablet, Oral, Q4H PRN, Judy Deng APRN    HYDROmorphone (DILAUDID) injection 0.5 mg, 0.5 mg, Intravenous, Q3H PRN, Judy Deng APRN    insulin lispro (HUMALOG/ADMELOG) injection 2-7 Units, 2-7 Units, Subcutaneous, 4x Daily AC & at Bedtime, Leola Chacon MD    loperamide (IMODIUM) capsule 2 mg, 2 mg, Oral, 4x Daily PRN, Leola Chacon MD    multivitamin with minerals 1 tablet, 1 tablet, Oral, Daily, Leola Chacon MD, 1 tablet at 07/11/23 1702    nitroglycerin (NITROSTAT) SL tablet 0.4 mg, 0.4 mg, Sublingual, Q5 Min PRN, James Kowalski MD    ondansetron (ZOFRAN) injection 4 mg, 4 mg, Intravenous, Q6H PRN, Judy Deng APRN, 4 mg at 07/11/23 1903    piperacillin-tazobactam (ZOSYN) 3.375 g in iso-osmotic dextrose 50 ml (premix), 3.375 g, Intravenous, Q12H, Leola Chacon MD, 3.375 g at 07/12/23 0456    [COMPLETED] Insert Peripheral IV, , , Once **AND** sodium chloride 0.9 % flush 10 mL, 10 mL, Intravenous, PRN, Silvestre Corona MD    sodium chloride 0.9 % flush 10 mL, 10 mL, Intravenous, Q12H, Judy Deng APRN, 10 mL at 07/12/23 1022    sodium chloride 0.9 % flush 10 mL, 10 mL, Intravenous, PRN, Judy Deng APRN    sodium chloride 0.9 % infusion 40 mL, 40 mL, Intravenous, PRN, Judy Deng APRN  Review of Systems:    The following systems were reviewed and negative;   constitution, respiratory, cardiovascular, musculoskeletal, and neurological    Objective     Vital Signs  Temp:  [97.5 °F (36.4 °C)-98.6 °F (37 °C)] 98.6 °F (37 °C)  Heart Rate:  [59-71] 69  Resp:  [16] 16  BP: (101-157)/(39-73) 112/47  Body mass index is 24.14 kg/m².    Intake/Output Summary (Last 24 hours) at 7/12/2023 1233  Last data filed at 7/11/2023 1500  Gross per 24 hour   Intake --   Output 2000 ml   Net -2000 ml     No intake/output data recorded.     Physical Exam:   General: patient awake, alert and cooperative   Eyes: Normal lids and lashes, no scleral icterus   Neck: supple, normal ROM   Skin: warm and dry, not jaundiced   Cardiovascular: regular rhythm and rate, no murmurs auscultated   Pulm: clear to auscultation bilaterally, regular and unlabored   Abdomen: soft, mild tenderness, nondistended; normal bowel sounds   Extremities: no rash or edema   Psychiatric: Normal mood and behavior; memory intact     Results Review:     I reviewed the patient's new clinical results.    Results from last 7 days   Lab Units 07/12/23  0558 07/11/23  0622 07/10/23  1043   WBC 10*3/mm3 10.09 5.08 6.43   HEMOGLOBIN g/dL 9.9* 9.7* 11.3*   HEMATOCRIT % 31.2* 30.2* 35.3   PLATELETS 10*3/mm3 159 154 183     Results from last 7 days   Lab Units 07/12/23  0557 07/11/23  0622 07/10/23  1043   SODIUM mmol/L 135* 136 139   POTASSIUM mmol/L 4.3 4.5 4.3   CHLORIDE mmol/L 96* 99 96*   CO2 mmol/L 21.8* 23.9 29.7*   BUN mg/dL 18 22 18   CREATININE mg/dL 3.25* 4.72* 3.61*   CALCIUM mg/dL 9.0 9.1 10.0   BILIRUBIN mg/dL 5.8* 2.6* 1.3*   ALK PHOS U/L 879* 989* 679*   ALT (SGPT) U/L 181* 226* 132*   AST (SGOT) U/L 99* 250* 48*   GLUCOSE mg/dL 298* 79 147*         Lab Results   Lab Value Date/Time    LIPASE 38 07/10/2023 1043       Radiology:  XR Chest 1 View   Final Result   Cardiomegaly with mild, decreased pulmonary vascular   congestion. Minimal likely atelectasis at the right base.       This report was finalized on 7/11/2023 7:41 PM  by Dr. Itz Roe M.D.          US Abdomen Complete   Final Result      CT Abdomen Pelvis Without Contrast   Final Result   Limited in the absence of contrast and extensive artifact   from patient's bilateral hip hardware.    1. Distended gallbladder with wall thickening and containing calculi.   There is mild intrahepatic and proximal common bile duct dilatation with   suggestion of choledocholithiasis. Correlation with serum bilirubin   alkaline phosphatase and further evaluation with MRCP as indicated.   2. CT findings of gastritis.   3. Constipation.       These findings were discussed with Dr. Corona by telephone at the time   of dictation.       Radiation dose reduction techniques were utilized, including automated   exposure control and exposure modulation based on body size.       This report was finalized on 7/11/2023 12:16 PM by Dr. Chavo Fry M.D.              Assessment & Plan     Active Hospital Problems    Diagnosis     **Acute cholecystitis     Diastolic CHF, chronic     Anemia     COPD (chronic obstructive pulmonary disease)     DM (diabetes mellitus)     ESRD (end stage renal disease)     History of colon cancer     HTN (hypertension)     PAF (paroxysmal atrial fibrillation)     Peripheral neuropathy        Assessment/Recommendations:    Assessment:  Abdominal pain  Elevated liver tests  Acute cholecystitis suspected  Possible choledocholithiasis-imaging unclear of this  End-stage renal disease on hemodialysis  A-fib on Eliquis last dose 7/10  Cardiac clearance obtained for surgery     Plan:  Patient to have cholecystectomy today.  Hopefully intraoperative cholangiogram will also be obtained.  If needed an ERCP could be done if stones are seen in the bile duct.  This can be done after the cholecystectomy with intraoperative cholangiogram is done.    I discussed the patients findings and my recommendations with patient and family.    Jono Valdivia MD

## 2023-07-12 NOTE — PROGRESS NOTES
"   LOS: 2 days     Chief Complaint/ Reason for encounter: ESRD, dialysis management    Subjective   07/12/23 : Patient is doing well today with no new complaints  N.p.o. for surgery  No shortness of breath chest pain or edema  Voiding well with no dysuria  Admitted yesterday with abdominal pain nausea and vomiting and acute cholecystitis      Medical history reviewed:  History of Present Illness    Subjective    History taken from: Patient and chart    Vital Signs  Temp:  [97.5 °F (36.4 °C)-98.6 °F (37 °C)] 98.6 °F (37 °C)  Heart Rate:  [59-71] 69  Resp:  [16] 16  BP: (101-157)/(39-73) 112/47       Wt Readings from Last 1 Encounters:   07/12/23 0753 59.9 kg (132 lb)   07/12/23 0500 60 kg (132 lb 4.4 oz)   07/10/23 1007 55.3 kg (122 lb)       Objective:  Vital signs: (most recent): Blood pressure 112/47, pulse 69, temperature 98.6 °F (37 °C), temperature source Oral, resp. rate 16, height 157.5 cm (62\"), weight 59.9 kg (132 lb), SpO2 94 %.              Objective:  General Appearance:  Comfortable, well-appearing, in no acute distress and not in pain.  Awake, alert, oriented  HEENT: Mucous membranes moist, no injury, oropharynx clear  Lungs:  Normal effort and normal respiratory rate.  Breath sounds clear to auscultation.  No  respiratory distress.  No rales, decreased breath sounds or rhonchi.    Heart: Normal rate.  Regular rhythm.  S1, S2 normal.  No murmur.   Abdomen: Abdomen is soft.  Bowel sounds are normal, no abdominal tenderness.  There is no rebound or guarding  Extremities: No edema of bilateral lower extremities  Neurological: No focal motor or sensory deficits, pupils reactive  Skin:  Warm and dry.  No rash or cyanosis.       Results Review:    Intake/Output:     Intake/Output Summary (Last 24 hours) at 7/12/2023 1202  Last data filed at 7/11/2023 1500  Gross per 24 hour   Intake --   Output 2000 ml   Net -2000 ml         DATA:  Radiology and Labs:  The following labs independently reviewed by me. " Additional labs ordered for tomorrow a.m.  Interval notes, chart personally reviewed by me.   Old records independently reviewed showing ESRD on dialysis, normally receives dialysis in [place], visiting from out of town  The following radiologic studies independently viewed by me, findings CT abdomen and pelvis showed distended gallbladder with wall thickening, several gallstones  New problems include acute cholecystitis  Discussed with patient/family    Risk/ complexity of medical care/ medical decision making moderate risk, need for surgery today    Labs:   Recent Results (from the past 24 hour(s))   POC Glucose Once    Collection Time: 07/11/23  8:30 PM    Specimen: Blood   Result Value Ref Range    Glucose 59 (L) 70 - 130 mg/dL   POC Glucose Once    Collection Time: 07/11/23  9:07 PM    Specimen: Blood   Result Value Ref Range    Glucose 169 (H) 70 - 130 mg/dL   Comprehensive Metabolic Panel    Collection Time: 07/12/23  5:57 AM    Specimen: Blood   Result Value Ref Range    Glucose 298 (H) 65 - 99 mg/dL    BUN 18 8 - 23 mg/dL    Creatinine 3.25 (H) 0.57 - 1.00 mg/dL    Sodium 135 (L) 136 - 145 mmol/L    Potassium 4.3 3.5 - 5.2 mmol/L    Chloride 96 (L) 98 - 107 mmol/L    CO2 21.8 (L) 22.0 - 29.0 mmol/L    Calcium 9.0 8.6 - 10.5 mg/dL    Total Protein 5.9 (L) 6.0 - 8.5 g/dL    Albumin 3.2 (L) 3.5 - 5.2 g/dL    ALT (SGPT) 181 (H) 1 - 33 U/L    AST (SGOT) 99 (H) 1 - 32 U/L    Alkaline Phosphatase 879 (H) 39 - 117 U/L    Total Bilirubin 5.8 (H) 0.0 - 1.2 mg/dL    Globulin 2.7 gm/dL    A/G Ratio 1.2 g/dL    BUN/Creatinine Ratio 5.5 (L) 7.0 - 25.0    Anion Gap 17.2 (H) 5.0 - 15.0 mmol/L    eGFR 14.0 (L) >60.0 mL/min/1.73   CBC Auto Differential    Collection Time: 07/12/23  5:58 AM    Specimen: Blood   Result Value Ref Range    WBC 10.09 3.40 - 10.80 10*3/mm3    RBC 3.45 (L) 3.77 - 5.28 10*6/mm3    Hemoglobin 9.9 (L) 12.0 - 15.9 g/dL    Hematocrit 31.2 (L) 34.0 - 46.6 %    MCV 90.4 79.0 - 97.0 fL    MCH 28.7 26.6 -  33.0 pg    MCHC 31.7 31.5 - 35.7 g/dL    RDW 14.5 12.3 - 15.4 %    RDW-SD 47.4 37.0 - 54.0 fl    MPV 11.0 6.0 - 12.0 fL    Platelets 159 140 - 450 10*3/mm3    nRBC 0.0 0.0 - 0.2 /100 WBC   Manual Differential    Collection Time: 07/12/23  5:58 AM    Specimen: Blood   Result Value Ref Range    Neutrophil % 85.0 (H) 42.7 - 76.0 %    Lymphocyte % 2.0 (L) 19.6 - 45.3 %    Monocyte % 11.0 5.0 - 12.0 %    Eosinophil % 1.0 0.3 - 6.2 %    Basophil % 1.0 0.0 - 1.5 %    Neutrophils Absolute 8.58 (H) 1.70 - 7.00 10*3/mm3    Lymphocytes Absolute 0.20 (L) 0.70 - 3.10 10*3/mm3    Monocytes Absolute 1.11 (H) 0.10 - 0.90 10*3/mm3    Eosinophils Absolute 0.10 0.00 - 0.40 10*3/mm3    Basophils Absolute 0.10 0.00 - 0.20 10*3/mm3    Anisocytosis Mod/2+ None Seen    WBC Morphology Normal Normal    Platelet Morphology Normal Normal   Adult Transthoracic Echo Complete W/ Cont if Necessary Per Protocol    Collection Time: 07/12/23  8:21 AM   Result Value Ref Range    EF(MOD-bp) 73.1 %    LVIDd 4.3 cm    LVIDs 2.7 cm    IVSd 0.99 cm    LVPWd 0.92 cm    FS 37.2 %    IVS/LVPW 1.08 cm    ESV(cubed) 19.3 ml    LV Sys Vol (BSA corrected) 8.7 cm2    EDV(cubed) 77.8 ml    LV Salguero Vol (BSA corrected) 36.2 cm2    LVOT area 2.5 cm2    LV mass(C)d 132.5 grams    LVOT diam 1.80 cm    EDV(MOD-sp2) 55.0 ml    EDV(MOD-sp4) 58.0 ml    ESV(MOD-sp2) 17.0 ml    ESV(MOD-sp4) 14.0 ml    SV(MOD-sp2) 38.0 ml    SV(MOD-sp4) 44.0 ml    SI(MOD-sp2) 23.7 ml/m2    SI(MOD-sp4) 27.5 ml/m2    EF(MOD-sp2) 69.1 %    EF(MOD-sp4) 75.9 %    MV E max tk 135.0 cm/sec    MV A max tk 94.1 cm/sec    MV dec time 484 msec    MV E/A 1.43     Pulm A Revs Dur 0.13 sec    MV A dur 0.12 sec    LA ESV Index (BP) 38.4 ml/m2    Med Peak E' Tk 3.2 cm/sec    Lat Peak E' Tk 5.4 cm/sec    Avg E/e' ratio 31.40     SV(LVOT) 68.9 ml    SV(RVOT) 65.0 ml    Qp/Qs 0.94     RV Base 4.0 cm    RV Mid 3.4 cm    RV Length 7.0 cm    RV S' 7.9 cm/sec    Pulm Sys Tk 25.3 cm/sec    Pulm Salguero Tk 44.1  cm/sec    Pulm S/D 0.57     Pulm A Revs Tk 37.6 cm/sec    LV V1 max 122.7 cm/sec    LV V1 max PG 6.0 mmHg    LV V1 mean PG 3.1 mmHg    LV V1 VTI 27.0 cm    Ao pk tk 171.3 cm/sec    Ao max PG 11.7 mmHg    Ao mean PG 5.7 mmHg    Ao V2 VTI 36.1 cm    LEE(I,D) 1.91 cm2    MV max PG 13.5 mmHg    MV mean PG 4.9 mmHg    MV V2 VTI 58.5 cm    MV P1/2t 164.4 msec    MVA(P1/2t) 1.34 cm2    MVA(VTI) 1.18 cm2    MV dec slope 351.3 cm/sec2    TR max tk 374.0 cm/sec    TR max PG 56.0 mmHg    RVOT diam 2.23 cm    RV V1 max PG 1.95 mmHg    RV V1 max 69.8 cm/sec    RV V1 VTI 16.6 cm    PA V2 max 101.0 cm/sec    PA acc time 0.10 sec    PI end-d tk 119.4 cm/sec    Ao root diam 2.8 cm    ACS 1.57 cm    Sinus 2.7 cm    STJ 2.33 cm    TAPSE (>1.6) 1.50 cm    Dimensionless Index 0.70 (DI)    RVSP(TR) 71 mmHg    RAP systole 15 mmHg   POC Glucose Once    Collection Time: 07/12/23 10:24 AM    Specimen: Blood   Result Value Ref Range    Glucose 323 (H) 70 - 130 mg/dL   POC Glucose Once    Collection Time: 07/12/23 11:26 AM    Specimen: Blood   Result Value Ref Range    Glucose 361 (H) 70 - 130 mg/dL       Radiology:  Pertinent radiology studies were reviewed as described above      Medications have been reviewed separately in chart overview      ASSESSMENT:  ESRD, will keep on a TTS dialysis schedule this admission due to surgery today  Cholecystitis, cholecystectomy planned for today  Anemia of CKD  Hyperphosphatemia   HTN  Elevated transaminase levels due to acute cholecystitis  Metabolic acidosis      Plan:  Cholecystectomy planned for this afternoon, okay from renal standpoint  Plan dialysis tomorrow and continue Tuesday Thursday Saturday schedule  UF interval gain  Epogen with HD for anemia plus oral iron  Continue current BP regimen, vitamin D and diuretics  IV antibiotics, renally dosed for GFR less than 15      I discussed the patients findings and my recommendations with patient and family      Marv Carroll MD   [facility]   Office phone number: [phone]  Answering service phone number: [phone]    07/12/23  12:02 EDT    Dictation performed using Dragon dictation software

## 2023-07-12 NOTE — SIGNIFICANT NOTE
07/12/23 0858   OTHER   Discipline physical therapist   Rehab Time/Intention   Session Not Performed patient unavailable for evaluation  (Pt RAJAT cardiology this AM, also plans for surgery later today. Will f/u plio.)   Recommendation   PT - Next Appointment 07/13/23

## 2023-07-12 NOTE — PROGRESS NOTES
Name: Kalyn Mejia ADMIT: 7/10/2023   : 1945  PCP: Provider, No Known    MRN: [id number] LOS: 2 days   AGE/SEX: 78 y.o. female  ROOM: [facility]     Subjective   Subjective   Patient seen this morning.  Was hypoglycemic yesterday evening, will blood glucose of 59, treated per hypoglycemia protocol.  This morning blood was elevated.  Discussed with RN, patient's Dexcom reading and POC glucose checks are not correlating.  This morning patient Dexcom was reading blood glucose above 500, however BNP was 298.  Patient Denies any symptoms this morning, No Nausea Vomiting, No Fevers No Chills.  Plan for Cholecystectomy This Afternoon.    Review of Systems   As above  Objective   Objective   Vital Signs  Temp:  [97.5 °F (36.4 °C)-98.6 °F (37 °C)] 97.5 °F (36.4 °C)  Heart Rate:  [59-71] 67  Resp:  [16] 16  BP: (101-154)/(39-63) 114/48  SpO2:  [87 %-95 %] 92 %  on   ;   Device (Oxygen Therapy): room air  Body mass index is 24.14 kg/m².  Physical Exam    General alert, laying in bed, not in distress, chronically appearing  HEENT: Normocephalic, atraumatic  CV: Regular rate and rhythm, no murmurs   Lungs: Clear to auscultation bilaterally, no crackles or wheezes  Abdomen: Soft, nondistended, nontender to palpation   Extremities: No significant peripheral edema , no cyanosis     Results Review     I reviewed the patient's new clinical results.  Results from last 7 days   Lab Units 23  0558 2322 07/10/23  1043 23  0648   WBC 10*3/mm3 10.09 5.08 6.43 6.54   HEMOGLOBIN g/dL 9.9* 9.7* 11.3* 10.2*   PLATELETS 10*3/mm3 159 154 183 170       Results from last 7 days   Lab Units 23  0557 23  0622 07/10/23  1043 23  0648   SODIUM mmol/L 135* 136 139 139   POTASSIUM mmol/L 4.3 4.5 4.3 4.2   CHLORIDE mmol/L 96* 99 96* 100   CO2 mmol/L 21.8* 23.9 29.7* 25.6   BUN mg/dL 18 22 18 14   CREATININE mg/dL 3.25* 4.72* 3.61* 2.77*   GLUCOSE mg/dL 298* 79 147* 104*     Estimated Creatinine  Clearance: 13.5 mL/min (A) (by C-G formula based on SCr of 3.25 mg/dL (H)).  Results from last 7 days   Lab Units 07/12/23  0557 07/11/23  0622 07/10/23  1043   ALBUMIN g/dL 3.2* 3.0* 3.6   BILIRUBIN mg/dL 5.8* 2.6* 1.3*   ALK PHOS U/L 879* 989* 679*   AST (SGOT) U/L 99* 250* 48*   ALT (SGPT) U/L 181* 226* 132*       Results from last 7 days   Lab Units 07/12/23  0557 07/11/23  0622 07/10/23  1043 07/06/23  0648   CALCIUM mg/dL 9.0 9.1 10.0 9.3   ALBUMIN g/dL 3.2* 3.0* 3.6  --    MAGNESIUM mg/dL  --  2.3  --   --    PHOSPHORUS mg/dL  --  5.0*  --   --        Results from last 7 days   Lab Units 07/10/23  1351   LACTATE mmol/L 1.1     No results found for: COVID19  Glucose   Date/Time Value Ref Range Status   07/12/2023 1126 361 (H) 70 - 130 mg/dL Final     Comment:     Meter: [id number] : [id number] Elizabeth THOMPSON   07/12/2023 1024 323 (H) 70 - 130 mg/dL Final     Comment:     Meter: [id number] : [id number] Elizabeth THOMPSON   07/11/2023 2107 169 (H) 70 - 130 mg/dL Final     Comment:     Meter: [id number] : [id number] Demetris Dobson   07/11/2023 2030 59 (L) 70 - 130 mg/dL Final     Comment:     Meter: [id number] : [id number] Kecia Correa RN   07/11/2023 0620 86 70 - 130 mg/dL Final     Comment:     Meter: [id number] : [id number] Ramon THOMPSON   07/10/2023 2009 85 70 - 130 mg/dL Final     Comment:     Meter: [id number] : [id number] Elizabeth THOMPSON   07/10/2023 1642 103 70 - 130 mg/dL Final     Comment:     Meter: [id number] : [id number] Sweeneykaran Davenport NA           Adult Transthoracic Echo Complete W/ Cont if Necessary Per Protocol    : Left ventricular systolic function is hyperdynamic (EF > 70%).   Calculated left ventricular EF = 73.1% Septal wall motion is abnormal.   Systolic flattening of the interventricular septum consistent with right   ventricle pressure overload. Normal left ventricular cavity size and wall   thickness noted. Left ventricular diastolic function was  indeterminate.   Elevated left atrial pressure.    The right ventricular cavity is moderately dilated. Moderately reduced   right ventricular systolic function noted    Left atrial volume is moderately increased. The right atrial cavity is   severely dilated.    : No aortic valve regurgitation or stenosis is present. The aortic   valve is abnormal in structure. There is mild thickening of the aortic   valve    No mitral valve regurgitation or significant stenosis is present. Fixed   posterior leaflet findings are consistent with surgical mitral valve   repair.    Moderate tricuspid valve regurgitation is present. Estimated right   ventricular systolic pressure from tricuspid regurgitation is markedly   elevated (>55 mmHg). Calculated right ventricular systolic pressure from   tricuspid regurgitation is 71 mmHg.    Scheduled Medications  bumetanide, 1 mg, Oral, Daily  cadexomer iodine, 1 application , Topical, Daily  cholecalciferol, 2,000 Units, Oral, Daily  [START ON 7/13/2023] epoetin joceline/joceline-epbx, 5,000 Units, Intravenous, Once per day on Tue Thu Sat  ferrous sulfate, 325 mg, Oral, Every Other Day  insulin lispro, 2-7 Units, Subcutaneous, 4x Daily AC & at Bedtime  multivitamin with minerals, 1 tablet, Oral, Daily  piperacillin-tazobactam, 3.375 g, Intravenous, Q12H  senna-docusate sodium, 2 tablet, Oral, BID  sodium chloride, 10 mL, Intravenous, Q12H    Infusions   Diet  NPO Diet NPO Type: Strict NPO    I have personally reviewed     [x]  Laboratory   [x]  Microbiology   [x]  Radiology   [x]  EKG/Telemetry  []  Cardiology/Vascular   []  Pathology    []  Records       Assessment/Plan     Active Hospital Problems    Diagnosis  POA    **Acute cholecystitis [K81.0]  Yes    Diastolic CHF, chronic [I50.32]  Yes    Anemia [D64.9]  Yes    COPD (chronic obstructive pulmonary disease) [J44.9]  Yes    DM (diabetes mellitus) [E11.9]  Yes    ESRD (end stage renal disease) [N18.6]  Yes    History of colon cancer [Z85.038]   Yes    HTN (hypertension) [I10]  Yes    PAF (paroxysmal atrial fibrillation) [I48.0]  Yes    Peripheral neuropathy [G62.9]  Yes      Resolved Hospital Problems   No resolved problems to display.       78 y.o. female admitted with Acute cholecystitis.    Ms. Mejia is a 78 y.o. with ESRD admitted with abdominal pain.  CT abdomen pelvis with cholelithiasis with cholecystitis but imaging limited by lack of contrast.       Acute cholecystitis: Continue IV Zosyn, surgery and GI following, plan for cholecystectomy today.  LFTs improving, monitor daily.       ESRD-volume management dialysis, appreciate recs.  -     Left plantar foot wound with large callus:  Status post excisional debridement 7/5/2023.  Doing well postop.  Wound care following.     Diabetes type 1/Peripheral neuropathy: Brittle diabetic,.Glucose previously difficult to control. Avoid hypoglycemia.   will manage diabetes using POC blood glucose readings, although she has been  discrepancies between Dexcom reading and POC blood glucose checks.  Ordered 2 units of IV lispro once.  Ordered sliding scale insulin, 1 unit for every 50 reading above 150.     Paroxysmal atrial fibrillation: Hold anticoagulation secondary to planned surgery. Rate controlled, on carvedilol     Gastritis- per CT. IV Pepcid.     Anemia: Likely secondary to ESRD.  Hemoglobin stable.  Monitor.     COPD: Clinically stable.     Hypertension: Stable on current regimen.  Monitor.        CHF: Fluid manage with dialysis   last echocardiogram 2/23 showed EF of 65%.           CODE STATUS: Full code  DVT prophylaxis: SCDs  Position: To be determined      Copied text in this note has been reviewed and is accurate as of 07/12/23.         Dictated utilizing Dragon dictation        Leola Chacon MD  [facility]  07/12/23  15:09 EDT

## 2023-07-12 NOTE — CASE MANAGEMENT/SOCIAL WORK
Discharge Planning Assessment  [facility]     Patient Name: Kalyn Mejia  MRN: [id number]  Today's Date: 7/12/2023    Admit Date: 7/10/2023    Plan: Return home with family   Discharge Needs Assessment       Row Name 07/12/23 1137       Living Environment    People in Home child(david), adult    Name(s) of People in Home Alejo Aguero    Current Living Arrangements home    Potentially Unsafe Housing Conditions none    Primary Care Provided by self    Provides Primary Care For no one, unable/limited ability to care for self    Family Caregiver if Needed child(david), adult    Family Caregiver Names Alejo Munoz [phone]    Quality of Family Relationships helpful    Able to Return to Prior Arrangements yes  Is here visiting from [place]       Resource/Environmental Concerns    Resource/Environmental Concerns none    Transportation Concerns none       Food Insecurity    Within the past 12 months, you worried that your food would run out before you got the money to buy more. Never true    Within the past 12 months, the food you bought just didn't last and you didn't have money to get more. Never true       Transition Planning    Patient/Family Anticipates Transition to home with family    Patient/Family Anticipated Services at Transition none    Transportation Anticipated family or friend will provide       Discharge Needs Assessment    Readmission Within the Last 30 Days no previous admission in last 30 days    Equipment Currently Used at Home wheelchair;shower chair;grab bar;walker, rolling    Concerns to be Addressed basic needs    Anticipated Changes Related to Illness none    Equipment Needed After Discharge none                   Discharge Plan       Row Name 07/12/23 1138       Plan    Plan Return home with family    Patient/Family in Agreement with Plan yes    Plan Comments Spoke with patient at bedside.  She lives in [place] but was here visiting her daughter.  She is currently staying with  her sister Taisha Guzman [phone].  She uses a W/C, grab bars, shower chair, walker.  She gets HD on MWF @ [facility].  PCP is Dr. Rose Marie Lilly and she will use [facility] pharmacy in [place].  Additional emergency contact is her sister Harika Lopez [phone].  Patient plans to return home with family.  CCP will follow.  Merry SLADE                  Continued Care and Services - Admitted Since 7/10/2023    Coordination has not been started for this encounter.       Selected Continued Care - Prior Encounters Includes continued care and service providers with selected services from prior encounters from 4/11/2023 to 7/12/2023      Discharged on 7/6/2023 Admission date: 7/4/2023 - Discharge disposition: Home or Self Care      Home Medical Care       Service Provider Selected Services Address Phone Fax Patient Preferred     [facility] [address] [place] [id number] [phone] --                          Expected Discharge Date and Time       Expected Discharge Date Expected Discharge Time    Jul 12, 2023            Demographic Summary       Row Name 07/12/23 1136       General Information    Admission Type inpatient    Arrived From home    Referral Source admission list    Reason for Consult discharge planning    Preferred Language English                   Functional Status       Row Name 07/12/23 1136       Functional Status    Usual Activity Tolerance fair    Current Activity Tolerance fair       Functional Status, IADL    Medications assistive person    Meal Preparation assistive person    Housekeeping assistive person    Laundry assistive person    Shopping assistive person       Mental Status    General Appearance WDL WDL       Mental Status Summary    Recent Changes in Mental Status/Cognitive Functioning no changes                               Becky S. [place], RN

## 2023-07-12 NOTE — PROGRESS NOTES
Chart reviewed.  Patient remains on room air.  Chest x-ray shows improvement in volume status.  We will continue to keep the patient as negative as possible with dialysis.  No new recommendations from my standpoint.  We will continue to follow as needed for any new concerns.  Please see consult note yesterday for all recommendations.

## 2023-07-12 NOTE — OP NOTE
Operative Note :   Rolando Ivey MD    Kalyn Mejia  1945    Procedure Date: 07/12/23    Pre-op Diagnosis:  Acute cholecystitis [K81.0]    Post-Op Diagnosis:  Same with choledocholithiasis    Procedure:   Laparoscopic cholecystectomy with cholangiogram and da Landry robot assistance    Surgeon: Rolando Ivey MD    Assistant: Marv Maza PA-C was responsible for performing the following activities: Irrigation, suction, retraction, manipulation of the camera, closure of skin and placement of dressings as well as exchange of instruments at bedside and their skilled assistance was necessary for the success of this case.    Anesthesia:  General    EBL:   minimal    Specimens:   Gallbladder and contents    Indications:  78-year-old lady with multiple medical issues presents with acute cholecystitis and concern for possible choledocholithiasis    Findings:   Moderately inflamed gallbladder  Mild adhesions, predominantly involving the omentum against the anterior abdominal wall  Massively dilated common bile duct and proximal ducts and no contrast seen spilling into the duodenum    Recommendations:   GI consultation for possible ERCP  Continue antibiotics for now  Routine cholecystectomy care    Description of procedure:    After obtaining informed consent, patient was brought to the operating room and placed under a general anesthetic.  The abdomen was sterilely prepped and draped.  Villalta's point was identified and anesthetized with a Marcaine solution.  8 mm skin incision made and then 0 degree scope and Optiview trocar was used with direct access technique, placed through the abdominal wall layers into the abdominal cavity.  The abdomen was then insufflated and inspection of the abdomen demonstrated no evidence of intra-abdominal injury.  Additional 8 mm robotic trocars were then introduced into the abdomen with an 8 mm right lateral trocar, 8 mm right mid abdomen trocar and 8 mm left midabdomen trocar.   There were some light adhesions of the omentum against the anterior abdominal wall which had to be taken down in order to facilitate trocar placement.  The patient was positioned with the head up and right side up.  The da Landry Xi robot was then brought up and docked.  From right to left the fenestrated bipolar grasper, 30 degree scope, monopolar hook and ProGrasp forceps were introduced under direct visualization.  I then moved to the console.  The fundus of the gallbladder was grasped and retracted cephalad.  Any adhesions to the fundus or infundibulum were taken down with a combination of sharp and blunt dissection, exposing the infundibulum.  The infundibulum was then grasped and retracted laterally.  The peritoneum overlying the cystic triangle was incised.  Next using a combination of sharp and blunt dissection and using fluorescent cholangiography, the cystic duct was dissected and identified completely.  The cystic artery was identified and dissected completely.  The remainder of the cystic triangle was cleared out, creating the retrocystic window and then the lower one third of the gallbladder was mobilized off the bed of the liver, giving the critical view of safety.  Again the fluoroscopic cholangiography was used to confirm impression of the anatomy.  2 clips were placed proximally on the cystic artery.  A clip was placed on the gallbladder side of the gallbladder cystic duct junction.  An incision was made in the cystic duct and the cholangiocatheter was then introduced through the abdominal wall and clipped into place in the cystic duct.  We shot our cholangiogram.  Eventually the duct did appear to fill that it was difficult to get it to fill as it was massively dilated.  The contrast seem to extend down towards the ampulla, but no contrast entered the duodenum.  We waited for some period and attempted to do it again.  Even the cystic duct was quite dilated.  We were able to get the proximal biliary  radicles to fill and these were dilated as well.  I could not clearly see any stones within the duct, but I think it is reasonable to into it that there is an impacted distal common bile duct stone.  I did not feel that she was a good candidate for open common duct exploration and felt that she would do better with a postoperative ERCP and as such we concluded the cholangiogram.  The cholangiocatheter was withdrawn.  The cystic duct was then clipped and then divided between the clips.  The cystic artery was divided between clips.  There were 2 branches to the cystic artery which were individually clipped.  The gallbladder was removed from the bed of the liver with electrocautery and then placed in an Endo Catch bag.  The bed of the liver was inspected and any small bleeders were cauterized.  The clips on the cystic duct and cystic artery were inspected and appeared to be in good position.  The abdomen was irrigated.  Trocars were withdrawn under direct visualization after removal of the gallbladder.  The gallbladder was removed from the left mid abdominal trocar site and this had to be extended a bit in order to retrieve the specimen.  The fascia at this site was closed with 0 Vicryl.  Skin incisions were closed with 4-0 Monocryl.  Patient tolerated this well.    Rolando Ivey MD  General and Endoscopic Surgery  [facility]    [address], [address]  [place], [place], [address]  P: [phone]

## 2023-07-13 ENCOUNTER — ANESTHESIA (OUTPATIENT)
Dept: GASTROENTEROLOGY | Facility: HOSPITAL | Age: 78
DRG: 417 | End: 2023-07-13
Payer: MEDICARE

## 2023-07-13 ENCOUNTER — ANESTHESIA EVENT (OUTPATIENT)
Dept: GASTROENTEROLOGY | Facility: HOSPITAL | Age: 78
DRG: 417 | End: 2023-07-13
Payer: MEDICARE

## 2023-07-13 ENCOUNTER — APPOINTMENT (OUTPATIENT)
Dept: GENERAL RADIOLOGY | Facility: HOSPITAL | Age: 78
DRG: 417 | End: 2023-07-13
Payer: MEDICARE

## 2023-07-13 PROBLEM — K80.63 CALCULUS OF GALLBLADDER AND BILE DUCT WITH ACUTE CHOLECYSTITIS, WITH OBSTRUCTION: Status: ACTIVE | Noted: 2023-07-10

## 2023-07-13 LAB
ALBUMIN SERPL-MCNC: 3.2 G/DL (ref 3.5–5.2)
ALBUMIN/GLOB SERPL: 1.1 G/DL
ALP SERPL-CCNC: 874 U/L (ref 39–117)
ALT SERPL W P-5'-P-CCNC: 156 U/L (ref 1–33)
ANION GAP SERPL CALCULATED.3IONS-SCNC: 22.5 MMOL/L (ref 5–15)
AST SERPL-CCNC: 77 U/L (ref 1–32)
BASOPHILS # BLD MANUAL: 0.12 10*3/MM3 (ref 0–0.2)
BASOPHILS NFR BLD MANUAL: 1 % (ref 0–1.5)
BILIRUB SERPL-MCNC: 4.8 MG/DL (ref 0–1.2)
BUN SERPL-MCNC: 40 MG/DL (ref 8–23)
BUN/CREAT SERPL: 8.8 (ref 7–25)
CALCIUM SPEC-SCNC: 9.5 MG/DL (ref 8.6–10.5)
CHLORIDE SERPL-SCNC: 92 MMOL/L (ref 98–107)
CO2 SERPL-SCNC: 14.5 MMOL/L (ref 22–29)
CREAT SERPL-MCNC: 4.55 MG/DL (ref 0.57–1)
DEPRECATED RDW RBC AUTO: 48.2 FL (ref 37–54)
EGFRCR SERPLBLD CKD-EPI 2021: 9.4 ML/MIN/1.73
ERYTHROCYTE [DISTWIDTH] IN BLOOD BY AUTOMATED COUNT: 14.5 % (ref 12.3–15.4)
GLOBULIN UR ELPH-MCNC: 2.9 GM/DL
GLUCOSE BLDC GLUCOMTR-MCNC: 135 MG/DL (ref 70–130)
GLUCOSE BLDC GLUCOMTR-MCNC: 173 MG/DL (ref 70–130)
GLUCOSE BLDC GLUCOMTR-MCNC: 204 MG/DL (ref 70–130)
GLUCOSE BLDC GLUCOMTR-MCNC: 247 MG/DL (ref 70–130)
GLUCOSE BLDC GLUCOMTR-MCNC: 315 MG/DL (ref 70–130)
GLUCOSE BLDC GLUCOMTR-MCNC: 323 MG/DL (ref 70–130)
GLUCOSE SERPL-MCNC: 322 MG/DL (ref 65–99)
HCT VFR BLD AUTO: 32.2 % (ref 34–46.6)
HGB BLD-MCNC: 10.2 G/DL (ref 12–15.9)
LYMPHOCYTES # BLD MANUAL: 0.12 10*3/MM3 (ref 0.7–3.1)
LYMPHOCYTES NFR BLD MANUAL: 5 % (ref 5–12)
MCH RBC QN AUTO: 29 PG (ref 26.6–33)
MCHC RBC AUTO-ENTMCNC: 31.7 G/DL (ref 31.5–35.7)
MCV RBC AUTO: 91.5 FL (ref 79–97)
MONOCYTES # BLD: 0.58 10*3/MM3 (ref 0.1–0.9)
NEUTROPHILS # BLD AUTO: 10.72 10*3/MM3 (ref 1.7–7)
NEUTROPHILS NFR BLD MANUAL: 93 % (ref 42.7–76)
PHOSPHATE SERPL-MCNC: 5.2 MG/DL (ref 2.5–4.5)
PLAT MORPH BLD: NORMAL
PLATELET # BLD AUTO: 175 10*3/MM3 (ref 140–450)
PMV BLD AUTO: 11.6 FL (ref 6–12)
POTASSIUM SERPL-SCNC: 4.8 MMOL/L (ref 3.5–5.2)
PROT SERPL-MCNC: 6.1 G/DL (ref 6–8.5)
RBC # BLD AUTO: 3.52 10*6/MM3 (ref 3.77–5.28)
RBC MORPH BLD: NORMAL
SODIUM SERPL-SCNC: 129 MMOL/L (ref 136–145)
VARIANT LYMPHS NFR BLD MANUAL: 1 % (ref 19.6–45.3)
WBC MORPH BLD: NORMAL
WBC NRBC COR # BLD: 11.53 10*3/MM3 (ref 3.4–10.8)

## 2023-07-13 PROCEDURE — 0F798ZZ DILATION OF COMMON BILE DUCT, VIA NATURAL OR ARTIFICIAL OPENING ENDOSCOPIC: ICD-10-PCS | Performed by: INTERNAL MEDICINE

## 2023-07-13 PROCEDURE — 25010000002 NALOXONE PER 1 MG

## 2023-07-13 PROCEDURE — 99024 POSTOP FOLLOW-UP VISIT: CPT | Performed by: SURGERY

## 2023-07-13 PROCEDURE — 85025 COMPLETE CBC W/AUTO DIFF WBC: CPT | Performed by: INTERNAL MEDICINE

## 2023-07-13 PROCEDURE — 74328 X-RAY BILE DUCT ENDOSCOPY: CPT

## 2023-07-13 PROCEDURE — 25010000002 PIPERACILLIN SOD-TAZOBACTAM PER 1 G: Performed by: SURGERY

## 2023-07-13 PROCEDURE — 63710000001 INSULIN LISPRO (HUMAN) PER 5 UNITS: Performed by: SURGERY

## 2023-07-13 PROCEDURE — 84100 ASSAY OF PHOSPHORUS: CPT | Performed by: SURGERY

## 2023-07-13 PROCEDURE — 97162 PT EVAL MOD COMPLEX 30 MIN: CPT

## 2023-07-13 PROCEDURE — 25010000002 EPOETIN ALFA-EPBX 3000 UNIT/ML SOLUTION: Performed by: SURGERY

## 2023-07-13 PROCEDURE — 25010000002 HYDROMORPHONE PER 4 MG: Performed by: SURGERY

## 2023-07-13 PROCEDURE — 43273 ENDOSCOPIC PANCREATOSCOPY: CPT | Performed by: INTERNAL MEDICINE

## 2023-07-13 PROCEDURE — 43262 ENDO CHOLANGIOPANCREATOGRAPH: CPT | Performed by: INTERNAL MEDICINE

## 2023-07-13 PROCEDURE — 25010000002 PROPOFOL 10 MG/ML EMULSION: Performed by: ANESTHESIOLOGY

## 2023-07-13 PROCEDURE — 97110 THERAPEUTIC EXERCISES: CPT

## 2023-07-13 PROCEDURE — C1769 GUIDE WIRE: HCPCS | Performed by: INTERNAL MEDICINE

## 2023-07-13 PROCEDURE — 25510000001 IOPAMIDOL 61 % SOLUTION: Performed by: INTERNAL MEDICINE

## 2023-07-13 PROCEDURE — 99232 SBSQ HOSP IP/OBS MODERATE 35: CPT | Performed by: INTERNAL MEDICINE

## 2023-07-13 PROCEDURE — 85007 BL SMEAR W/DIFF WBC COUNT: CPT | Performed by: INTERNAL MEDICINE

## 2023-07-13 PROCEDURE — 82948 REAGENT STRIP/BLOOD GLUCOSE: CPT

## 2023-07-13 PROCEDURE — BF131ZZ FLUOROSCOPY OF GALLBLADDER AND BILE DUCTS USING LOW OSMOLAR CONTRAST: ICD-10-PCS | Performed by: INTERNAL MEDICINE

## 2023-07-13 PROCEDURE — 43264 ERCP REMOVE DUCT CALCULI: CPT | Performed by: INTERNAL MEDICINE

## 2023-07-13 PROCEDURE — 80053 COMPREHEN METABOLIC PANEL: CPT | Performed by: INTERNAL MEDICINE

## 2023-07-13 RX ORDER — SODIUM CHLORIDE 9 MG/ML
30 INJECTION, SOLUTION INTRAVENOUS CONTINUOUS PRN
Status: DISCONTINUED | OUTPATIENT
Start: 2023-07-13 | End: 2023-07-18 | Stop reason: HOSPADM

## 2023-07-13 RX ORDER — SODIUM CHLORIDE 9 MG/ML
INJECTION, SOLUTION INTRAVENOUS CONTINUOUS PRN
Status: DISCONTINUED | OUTPATIENT
Start: 2023-07-13 | End: 2023-07-13 | Stop reason: SURG

## 2023-07-13 RX ORDER — PROPOFOL 10 MG/ML
VIAL (ML) INTRAVENOUS AS NEEDED
Status: DISCONTINUED | OUTPATIENT
Start: 2023-07-13 | End: 2023-07-13 | Stop reason: SURG

## 2023-07-13 RX ORDER — FAMOTIDINE 10 MG/ML
20 INJECTION, SOLUTION INTRAVENOUS EVERY 12 HOURS SCHEDULED
Status: DISCONTINUED | OUTPATIENT
Start: 2023-07-13 | End: 2023-07-13 | Stop reason: DRUGHIGH

## 2023-07-13 RX ORDER — NALOXONE HYDROCHLORIDE 0.4 MG/ML
INJECTION, SOLUTION INTRAMUSCULAR; INTRAVENOUS; SUBCUTANEOUS
Status: COMPLETED
Start: 2023-07-13 | End: 2023-07-13

## 2023-07-13 RX ORDER — LIDOCAINE HYDROCHLORIDE 20 MG/ML
INJECTION, SOLUTION INFILTRATION; PERINEURAL AS NEEDED
Status: DISCONTINUED | OUTPATIENT
Start: 2023-07-13 | End: 2023-07-13 | Stop reason: SURG

## 2023-07-13 RX ORDER — NALOXONE HCL 0.4 MG/ML
0.2 VIAL (ML) INJECTION ONCE
Status: COMPLETED | OUTPATIENT
Start: 2023-07-13 | End: 2023-07-13

## 2023-07-13 RX ORDER — HYDROMORPHONE HYDROCHLORIDE 1 MG/ML
0.25 INJECTION, SOLUTION INTRAMUSCULAR; INTRAVENOUS; SUBCUTANEOUS
Status: DISCONTINUED | OUTPATIENT
Start: 2023-07-13 | End: 2023-07-14

## 2023-07-13 RX ORDER — FAMOTIDINE 10 MG/ML
10 INJECTION, SOLUTION INTRAVENOUS EVERY 12 HOURS SCHEDULED
Status: DISCONTINUED | OUTPATIENT
Start: 2023-07-13 | End: 2023-07-15

## 2023-07-13 RX ADMIN — PROPOFOL 20 MG: 10 INJECTION, EMULSION INTRAVENOUS at 18:49

## 2023-07-13 RX ADMIN — Medication 1 APPLICATION: at 16:24

## 2023-07-13 RX ADMIN — PROPOFOL 20 MG: 10 INJECTION, EMULSION INTRAVENOUS at 19:07

## 2023-07-13 RX ADMIN — FAMOTIDINE 10 MG: 10 INJECTION INTRAVENOUS at 22:31

## 2023-07-13 RX ADMIN — FAMOTIDINE 10 MG: 10 INJECTION INTRAVENOUS at 16:21

## 2023-07-13 RX ADMIN — Medication 10 ML: at 13:55

## 2023-07-13 RX ADMIN — HYDROMORPHONE HYDROCHLORIDE 0.5 MG: 1 INJECTION, SOLUTION INTRAMUSCULAR; INTRAVENOUS; SUBCUTANEOUS at 00:49

## 2023-07-13 RX ADMIN — Medication 0.2 MG: at 07:15

## 2023-07-13 RX ADMIN — PIPERACILLIN SODIUM AND TAZOBACTAM SODIUM 3.38 G: 3; .375 INJECTION, SOLUTION INTRAVENOUS at 16:21

## 2023-07-13 RX ADMIN — PROPOFOL 50 MG: 10 INJECTION, EMULSION INTRAVENOUS at 18:44

## 2023-07-13 RX ADMIN — EPOETIN ALFA-EPBX 5000 UNITS: 3000 INJECTION, SOLUTION INTRAVENOUS; SUBCUTANEOUS at 11:00

## 2023-07-13 RX ADMIN — PROPOFOL 20 MG: 10 INJECTION, EMULSION INTRAVENOUS at 18:58

## 2023-07-13 RX ADMIN — LIDOCAINE HYDROCHLORIDE 30 MG: 20 INJECTION, SOLUTION INFILTRATION; PERINEURAL at 18:44

## 2023-07-13 RX ADMIN — Medication 10 ML: at 22:32

## 2023-07-13 RX ADMIN — PROPOFOL 20 MG: 10 INJECTION, EMULSION INTRAVENOUS at 19:01

## 2023-07-13 RX ADMIN — INSULIN LISPRO 4 UNITS: 100 INJECTION, SOLUTION INTRAVENOUS; SUBCUTANEOUS at 05:34

## 2023-07-13 RX ADMIN — PROPOFOL 20 MG: 10 INJECTION, EMULSION INTRAVENOUS at 18:51

## 2023-07-13 RX ADMIN — SODIUM CHLORIDE 30 ML/HR: 9 INJECTION, SOLUTION INTRAVENOUS at 16:59

## 2023-07-13 RX ADMIN — HYDROCODONE BITARTRATE AND ACETAMINOPHEN 1 TABLET: 7.5; 325 TABLET ORAL at 13:51

## 2023-07-13 RX ADMIN — PROPOFOL 20 MG: 10 INJECTION, EMULSION INTRAVENOUS at 18:53

## 2023-07-13 RX ADMIN — SODIUM CHLORIDE: 9 INJECTION, SOLUTION INTRAVENOUS at 18:41

## 2023-07-13 RX ADMIN — INSULIN GLARGINE-YFGN 5 UNITS: 100 INJECTION, SOLUTION SUBCUTANEOUS at 07:03

## 2023-07-13 RX ADMIN — PIPERACILLIN SODIUM AND TAZOBACTAM SODIUM 3.38 G: 3; .375 INJECTION, SOLUTION INTRAVENOUS at 04:01

## 2023-07-13 RX ADMIN — NALOXONE HYDROCHLORIDE 0.2 MG: 0.4 INJECTION, SOLUTION INTRAMUSCULAR; INTRAVENOUS; SUBCUTANEOUS at 07:15

## 2023-07-13 RX ADMIN — PROPOFOL 20 MG: 10 INJECTION, EMULSION INTRAVENOUS at 18:56

## 2023-07-13 NOTE — PROGRESS NOTES
Postop day 1 robotic cholecystectomy    Subjective:  Feels weak, has some abdominal pain.    Objective:  Afebrile, little bit hypotensive on dialysis  General: Chronic ill appearance, no acute distress  Eyes: Moderate icterus  Abdomen: Soft, appropriately tender, dressings are clean    Labs reviewed, white blood cell count 11.5 hemoglobin 10.2 AST and ALT in roughly the same range, total bilirubin 4.8 from 5.8 yesterday    Assessment and plan:  -Acute cholecystitis and choledocholithiasis, now status post cholecystectomy and cholangiogram  -Discussed with Dr. Hooker, she will require an ERCP  -Okay to advance diet from my standpoint after ERCP is complete    Rolando Ivey MD  General and Endoscopic Surgery  [facility]    [address], [address]  [place], [place], [address]  P: [phone]  F: [phone]

## 2023-07-13 NOTE — BRIEF OP NOTE
ENDOSCOPIC RETROGRADE CHOLANGIOPANCREATOGRAPHY  Progress Note    Kalyn Mejia  7/13/2023    Pre-op Diagnosis:   Calculus of gallbladder and bile duct with acute cholecystitis, with obstruction [K80.63]       Post-Op Diagnosis Codes:     * Calculus of gallbladder and bile duct with acute cholecystitis, with obstruction [K80.63]    Procedure/CPT® Codes:        Procedure(s):  ENDOSCOPIC RETROGRADE CHOLANGIOPANCREATOGRAPHY WITH SPHINCTEROTOMY AND BALLOON STONE SWEEP              Surgeon(s):  Pro Hooker MD    Anesthesia: Monitored Anesthesia Care    Staff:   Radiology Technologist: Andry Funk  Endo Technician: Kay Frost RN  Endo Nurse: Trena Herring RN         Estimated Blood Loss: minimal    Urine Voided: * No values recorded between 7/13/2023  6:30 PM and 7/13/2023  7:15 PM *    Specimens:                None          Drains: * No LDAs found *    Findings: ERCP with sphincterotomy balloon stone extraction performed showing what appeared to be a single common duct stone within the dilated biliary tree.  The ampulla was entirely within the diverticulum.  Sphincterotomy was then performed and balloon sweep revealed 3 separate common duct stones removed.  Question whether there is an additional stones within the cystic duct on obstructive cholangiogram though only air bubble was seen in the final image.  12 mm balloon sweep performed revealed no resistance on passage suggesting any residual stones will pass freely.  Patient tolerated well sent to recovery.        Complications: None          Pro Hooker MD     Date: 7/13/2023  Time: 19:16 EDT

## 2023-07-13 NOTE — NURSING NOTE
HD complete, no fluid removed due to hypotension. Post /53 HR 64 T 97.5. Patient is at DW. Verbal report given.

## 2023-07-13 NOTE — THERAPY EVALUATION
Patient Name: Kalyn Mejia  : 1945    MRN: [id number]                              Today's Date: 2023       Admit Date: 7/10/2023    Visit Dx:     ICD-10-CM ICD-9-CM   1. Abdominal pain, acute, right upper quadrant  R10.11 789.01     338.19   2. Calculus of gallbladder and bile duct with acute cholecystitis, with obstruction  K80.63 574.61   3. ESRD on dialysis  N18.6 585.6    Z99.2 V45.11   4. Acute cholecystitis  K81.0 575.0     Patient Active Problem List   Diagnosis    HTN (hypertension)    ESRD (end stage renal disease)    Peripheral neuropathy    DM (diabetes mellitus)    Anemia    COPD (chronic obstructive pulmonary disease)    PAF (paroxysmal atrial fibrillation)    History of colon cancer    Penetrating foot wound    Acute cholecystitis    Diastolic CHF, chronic    Calculus of gallbladder and bile duct with acute cholecystitis, with obstruction     Past Medical History:   Diagnosis Date    Cancer     CHF (congestive heart failure)     Diabetes mellitus     DVT (deep venous thrombosis)     Gastroparesis     GERD (gastroesophageal reflux disease)     Hyperlipidemia     Hypertension     Kidney transplant failure     Neuropathy     Osteoporosis     Pulmonary nodules     Renal failure     Rheumatoid arthritis     Spinal stenosis     Stroke      Past Surgical History:   Procedure Laterality Date    CHOLECYSTECTOMY N/A 2023    Procedure: CHOLECYSTECTOMY LAPAROSCOPIC WITH DAVINCI ROBOT with cholangiogram;  Surgeon: Rolando Ivey MD;  Location: [facility];  Service: Robotics - DaVinci;  Laterality: N/A;    INCISION AND DRAINAGE LEG Left 2023    Procedure: LEFT INCISION AND DRAINAGE FOOT;  Surgeon: Justice Rosario MD;  Location: [facility] OR;  Service: Vascular;  Laterality: Left;      General Information       Row Name 23 1549          Physical Therapy Time and Intention    Document Type evaluation  -AR     Mode of Treatment physical therapy  -AR       Row Name 23  1549          General Information    Patient Profile Reviewed yes  -AR     Prior Level of Function min assist:  from [place], staying w/ dtr, mostly uses WC, normally transfers indeepndently, recent foot surgery but has been WBAT per pt and sister  -AR     Existing Precautions/Restrictions fall  -AR     Barriers to Rehab none identified  -AR       Row Name 07/13/23 1549          Living Environment    People in Home child(david), adult  -AR       Row Name 07/13/23 1549          Home Main Entrance    Number of Stairs, Main Entrance --  has a ramp  -AR     Stair Railings, Main Entrance none  -AR       Row Name 07/13/23 1549          Cognition    Orientation Status (Cognition) oriented x 3  -AR       Row Name 07/13/23 1549          Safety Issues, Functional Mobility    Impairments Affecting Function (Mobility) balance;endurance/activity tolerance;strength;cognition;pain  -AR               User Key  (r) = Recorded By, (t) = Taken By, (c) = Cosigned By      Initials Name Provider Type    AR Malu Mittal, PT Physical Therapist                   Mobility       Row Name 07/13/23 1551          Bed Mobility    Bed Mobility supine-sit;sit-supine  -AR     Supine-Sit [place] (Bed Mobility) moderate assist (50% patient effort)  -AR     Sit-Supine [place] (Bed Mobility) maximum assist (25% patient effort)  -AR     Assistive Device (Bed Mobility) bed rails;draw sheet;head of bed elevated  -AR     Comment, (Bed Mobility) cues for sequencing  -AR       Row Name 07/13/23 1551          Transfers    Comment, (Transfers) knees blocked  -AR       Row Name 07/13/23 1551          Sit-Stand Transfer    Sit-Stand [place] (Transfers) moderate assist (50% patient effort)  -AR     Comment, (Sit-Stand Transfer) R knee blocked, unable to reach full upright  -AR               User Key  (r) = Recorded By, (t) = Taken By, (c) = Cosigned By      Initials Name Provider Type    AR Malu Mittal, PT Physical Therapist                    Obj/Interventions       Row Name 07/13/23 1552          Range of Motion Comprehensive    Comment, General Range of Motion B LE WFL  -AR       Row Name 07/13/23 1552          Strength Comprehensive (MMT)    Comment, General Manual Muscle Testing (MMT) Assessment R LE 2-3/5 during mobility  -AR       Row Name 07/13/23 1552          Balance    Balance Assessment sitting static balance  -AR     Static Sitting Balance contact guard;minimal assist  -AR               User Key  (r) = Recorded By, (t) = Taken By, (c) = Cosigned By      Initials Name Provider Type    AR Malu Mittal, PT Physical Therapist                   Goals/Plan       Row Name 07/13/23 1557          Bed Mobility Goal 1 (PT)    Activity/Assistive Device (Bed Mobility Goal 1, PT) bed mobility activities, all  -AR     [place] Level/Cues Needed (Bed Mobility Goal 1, PT) minimum assist (75% or more patient effort)  -AR     Time Frame (Bed Mobility Goal 1, PT) 1 week  -AR       Row Name 07/13/23 1557          Transfer Goal 1 (PT)    Activity/Assistive Device (Transfer Goal 1, PT) sit-to-stand/stand-to-sit;bed-to-chair/chair-to-bed  -AR     [place] Level/Cues Needed (Transfer Goal 1, PT) minimum assist (75% or more patient effort)  -AR     Time Frame (Transfer Goal 1, PT) 1 week  -AR               User Key  (r) = Recorded By, (t) = Taken By, (c) = Cosigned By      Initials Name Provider Type    AR Malu Mittal, PT Physical Therapist                   Clinical Impression       Row Name 07/13/23 1553          Pain    Pretreatment Pain Rating 3/10  -AR     Posttreatment Pain Rating 3/10  -AR     Pain Location - Side/Orientation Right  -AR     Pain Location lower  -AR     Pain Location - extremity  -AR     Pain Intervention(s) Medication (See MAR);Repositioned  -AR       Row Name 07/13/23 1553          Plan of Care Review    Plan of Care Reviewed With patient  -AR     Outcome Evaluation Pt admitted with acute cholecystitis, s/p laproscopic  cholecystectomy; h/o foot wound required surgery while here but reports has been WBAT.  Pt reports living in [place], has been staying w/ dtr and her family here, normally uses WC and able to transfer independently.  Today pt demonstrates generalized weakness, limited activity tolerance and impaired balance from baseline but able tos itup w/ maximal assist and pertially stand w/ moderate assist and R knee blocked.  Limited by fatigue, frequent cues to keep eyes open.  May benefit from DC to SNU, disucssed with pt and RN.  -AR       Row Name 07/13/23 1553          Therapy Assessment/Plan (PT)    Rehab Potential (PT) good, to achieve stated therapy goals  -AR     Criteria for Skilled Interventions Met (PT) yes  -AR     Therapy Frequency (PT) 5 times/wk  -AR       Row Name 07/13/23 1553          Vital Signs    O2 Delivery Pre Treatment room air  -AR       Row Name 07/13/23 1553          Positioning and Restraints    Pre-Treatment Position in bed  -AR     Post Treatment Position bed  -AR     In Bed notified nsg;supine;call light within reach;encouraged to call for assist;exit alarm on;with family/caregiver  -AR               User Key  (r) = Recorded By, (t) = Taken By, (c) = Cosigned By      Initials Name Provider Type    Malu Felix PT Physical Therapist                   Outcome Measures    No documentation.                                Physical Therapy Education       Title: PT OT SLP Therapies (In Progress)       Topic: Physical Therapy (In Progress)       Point: Mobility training (In Progress)       Learning Progress Summary             Patient Acceptance, E, NR by AR at 7/13/2023 1557                         Point: Home exercise program (In Progress)       Learning Progress Summary             Patient Acceptance, E, NR by AR at 7/13/2023 1557                         Point: Body mechanics (In Progress)       Learning Progress Summary             Patient Acceptance, E, NR by AR at 7/13/2023 1557                          Point: Precautions (In Progress)       Learning Progress Summary             Patient Acceptance, E, NR by AR at 7/13/2023 1557                                         User Key       Initials Effective Dates Name Provider Type Discipline    AR 06/16/21 -  Malu Mittal PT Physical Therapist PT                  PT Recommendation and Plan     Plan of Care Reviewed With: patient  Outcome Evaluation: Pt admitted with acute cholecystitis, s/p laproscopic cholecystectomy; h/o foot wound required surgery while here but reports has been WBAT.  Pt reports living in [place], has been staying w/ dtr and her family here, normally uses WC and able to transfer independently.  Today pt demonstrates generalized weakness, limited activity tolerance and impaired balance from baseline but able tos itup w/ maximal assist and pertially stand w/ moderate assist and R knee blocked.  Limited by fatigue, frequent cues to keep eyes open.  May benefit from DC to SNU, disucssed with pt and RN.     Time Calculation:    PT Charges       Row Name 07/13/23 1548             Time Calculation    Start Time 1448  -AR      Stop Time 1515  -AR      Time Calculation (min) 27 min  -AR      PT Received On 07/13/23  -AR      PT - Next Appointment 07/14/23  -AR      PT Goal Re-Cert Due Date 07/20/23  -AR                User Key  (r) = Recorded By, (t) = Taken By, (c) = Cosigned By      Initials Name Provider Type    AR Malu Mittal PT Physical Therapist                  Therapy Charges for Today       Code Description Service Date Service Provider Modifiers Qty    70450643899 HC PT EVAL MOD COMPLEXITY 4 7/13/2023 Malu Mittal, PT GP 1    15676287963 HC PT THER PROC EA 15 MIN 7/13/2023 Malu Mittal PT GP 1            PT G-Codes  AM-PAC 6 Clicks Score (PT): 14  PT Discharge Summary  Anticipated Discharge Disposition (PT): skilled nursing facility    Malu Mittal PT  7/13/2023

## 2023-07-13 NOTE — PROGRESS NOTES
"   LOS: 3 days     Chief Complaint/ Reason for encounter: ESRD, dialysis management    Subjective   07/12/23 : Patient is doing well today with no new complaints  N.p.o. for surgery  No shortness of breath chest pain or edema  Voiding well with no dysuria  Admitted yesterday with abdominal pain nausea and vomiting and acute cholecystitis    7/13: Complaining of some postop pain, status post lap katelyn and now needs ERCP    Medical history reviewed:  History of Present Illness    Subjective    History taken from: Patient and chart    Vital Signs  Temp:  [97.3 °F (36.3 °C)-98 °F (36.7 °C)] 97.7 °F (36.5 °C)  Heart Rate:  [60-70] 63  Resp:  [14-16] 16  BP: ()/(35-87) 115/47       Wt Readings from Last 1 Encounters:   07/13/23 1100 54.9 kg (121 lb 0.5 oz)   07/13/23 0500 56.3 kg (124 lb 1.9 oz)   07/12/23 0753 59.9 kg (132 lb)   07/12/23 0500 60 kg (132 lb 4.4 oz)   07/10/23 1007 55.3 kg (122 lb)       Objective:  Vital signs: (most recent): Blood pressure 115/47, pulse 63, temperature 97.7 °F (36.5 °C), temperature source Oral, resp. rate 16, height 157.5 cm (62\"), weight 54.9 kg (121 lb 0.5 oz), SpO2 99 %.              Objective:  General Appearance:  Comfortable, well-appearing, in no acute distress and not in pain.  Awake, alert, oriented  HEENT: Mucous membranes moist, no injury, oropharynx clear  Lungs:  Normal effort and normal respiratory rate.  Breath sounds clear to auscultation.  No  respiratory distress.  No rales, decreased breath sounds or rhonchi.    Heart: Normal rate.  Regular rhythm.  S1, S2 normal.  No murmur.   Abdomen: Abdomen is soft.  Bowel sounds are normal, no abdominal tenderness.  There is no rebound or guarding  Extremities: No edema of bilateral lower extremities  Neurological: No focal motor or sensory deficits, pupils reactive  Skin:  Warm and dry.  No rash or cyanosis.       Results Review:    Intake/Output:     Intake/Output Summary (Last 24 hours) at 7/13/2023 1427  Last data filed " at 7/13/2023 1100  Gross per 24 hour   Intake --   Output 0 ml   Net 0 ml           DATA:  Radiology and Labs:  The following labs independently reviewed by me. Additional labs ordered for tomorrow a.m.  Interval notes, chart personally reviewed by me.   New problems include elevated LFTs  Discussed with patient/family    Risk/ complexity of medical care/ medical decision making moderate risk, postop lap katelyn    Labs:   Recent Results (from the past 24 hour(s))   POC Glucose Once    Collection Time: 07/12/23  3:17 PM    Specimen: Blood   Result Value Ref Range    Glucose 322 (H) 70 - 130 mg/dL   POC Glucose Once    Collection Time: 07/12/23  6:15 PM    Specimen: Blood   Result Value Ref Range    Glucose 268 (H) 70 - 130 mg/dL   POC Glucose Once    Collection Time: 07/12/23  6:16 PM    Specimen: Blood   Result Value Ref Range    Glucose 257 (H) 70 - 130 mg/dL   POC Glucose Once    Collection Time: 07/12/23  8:30 PM    Specimen: Blood   Result Value Ref Range    Glucose 334 (H) 70 - 130 mg/dL   POC Glucose Once    Collection Time: 07/13/23 12:43 AM    Specimen: Blood   Result Value Ref Range    Glucose 247 (H) 70 - 130 mg/dL   POC Glucose Once    Collection Time: 07/13/23  5:12 AM    Specimen: Blood   Result Value Ref Range    Glucose 315 (H) 70 - 130 mg/dL   POC Glucose Once    Collection Time: 07/13/23  6:37 AM    Specimen: Blood   Result Value Ref Range    Glucose 323 (H) 70 - 130 mg/dL   Comprehensive Metabolic Panel    Collection Time: 07/13/23  7:13 AM    Specimen: Blood   Result Value Ref Range    Glucose 322 (H) 65 - 99 mg/dL    BUN 40 (H) 8 - 23 mg/dL    Creatinine 4.55 (H) 0.57 - 1.00 mg/dL    Sodium 129 (L) 136 - 145 mmol/L    Potassium 4.8 3.5 - 5.2 mmol/L    Chloride 92 (L) 98 - 107 mmol/L    CO2 14.5 (L) 22.0 - 29.0 mmol/L    Calcium 9.5 8.6 - 10.5 mg/dL    Total Protein 6.1 6.0 - 8.5 g/dL    Albumin 3.2 (L) 3.5 - 5.2 g/dL    ALT (SGPT) 156 (H) 1 - 33 U/L    AST (SGOT) 77 (H) 1 - 32 U/L    Alkaline  Phosphatase 874 (H) 39 - 117 U/L    Total Bilirubin 4.8 (H) 0.0 - 1.2 mg/dL    Globulin 2.9 gm/dL    A/G Ratio 1.1 g/dL    BUN/Creatinine Ratio 8.8 7.0 - 25.0    Anion Gap 22.5 (H) 5.0 - 15.0 mmol/L    eGFR 9.4 (L) >60.0 mL/min/1.73   CBC Auto Differential    Collection Time: 07/13/23  7:13 AM    Specimen: Blood   Result Value Ref Range    WBC 11.53 (H) 3.40 - 10.80 10*3/mm3    RBC 3.52 (L) 3.77 - 5.28 10*6/mm3    Hemoglobin 10.2 (L) 12.0 - 15.9 g/dL    Hematocrit 32.2 (L) 34.0 - 46.6 %    MCV 91.5 79.0 - 97.0 fL    MCH 29.0 26.6 - 33.0 pg    MCHC 31.7 31.5 - 35.7 g/dL    RDW 14.5 12.3 - 15.4 %    RDW-SD 48.2 37.0 - 54.0 fl    MPV 11.6 6.0 - 12.0 fL    Platelets 175 140 - 450 10*3/mm3   Phosphorus    Collection Time: 07/13/23  7:13 AM    Specimen: Blood   Result Value Ref Range    Phosphorus 5.2 (H) 2.5 - 4.5 mg/dL   Manual Differential    Collection Time: 07/13/23  7:13 AM    Specimen: Blood   Result Value Ref Range    Neutrophil % 93.0 (H) 42.7 - 76.0 %    Lymphocyte % 1.0 (L) 19.6 - 45.3 %    Monocyte % 5.0 5.0 - 12.0 %    Basophil % 1.0 0.0 - 1.5 %    Neutrophils Absolute 10.72 (H) 1.70 - 7.00 10*3/mm3    Lymphocytes Absolute 0.12 (L) 0.70 - 3.10 10*3/mm3    Monocytes Absolute 0.58 0.10 - 0.90 10*3/mm3    Basophils Absolute 0.12 0.00 - 0.20 10*3/mm3    RBC Morphology Normal Normal    WBC Morphology Normal Normal    Platelet Morphology Normal Normal   POC Glucose Once    Collection Time: 07/13/23 12:20 PM    Specimen: Blood   Result Value Ref Range    Glucose 135 (H) 70 - 130 mg/dL       Radiology:  Pertinent radiology studies were reviewed as described above      Medications have been reviewed separately in chart overview      ASSESSMENT:  ESRD, will keep on a TTS dialysis schedule this admission due to surgery today  Cholecystitis, cholecystectomy planned for today  Anemia of CKD  Hyperphosphatemia   HTN  Elevated transaminase levels due to acute cholecystitis  Metabolic acidosis      Plan:  Dialysis earlier  today, tolerated well.  No UF due to hypotension  Continue Tuesday Thursday Saturday schedule  Status post lap katelyn, needs ERCP per surgery  Epogen with HD for anemia plus oral iron  Continue current BP regimen, vitamin D and diuretics  IV antibiotics, renally dosed for GFR less than 15        Marv Carroll MD   [facility]   Office phone number: [phone]  Answering service phone number: [phone]    07/13/23  14:27 EDT    Dictation performed using Dragon dictation software

## 2023-07-13 NOTE — PLAN OF CARE
Goal Outcome Evaluation:  Plan of Care Reviewed With: patient           Outcome Evaluation: Pt admitted with acute cholecystitis, s/p laproscopic cholecystectomy; h/o foot wound required surgery while here but reports has been WBAT.  Pt reports living in [place], has been staying w/ dtr and her family here, normally uses WC and able to transfer independently.  Today pt demonstrates generalized weakness, limited activity tolerance and impaired balance from baseline but able tos itup w/ maximal assist and pertially stand w/ moderate assist and R knee blocked.  Limited by fatigue, frequent cues to keep eyes open.  May benefit from DC to SNU, disucssed with pt and RN.      Anticipated Discharge Disposition (PT): skilled nursing facility

## 2023-07-13 NOTE — PROGRESS NOTES
"    DAILY PROGRESS NOTE  [facility]    Patient Identification:  Name: Kalyn Mejia  Age: 78 y.o.  Sex: female  :  1945  MRN: [id number]         Primary Care Physician: Provider, No Known    Subjective:  Interval History: Patient feeling a bit fatigued and tired.  She notes some nausea and some abdominal discomfort postoperatively but denies pain.  No emesis.  Denies any chest pain or troubles breathing    Objective: Patient seen in HD.  Seems a bit fatigued and tired but still conversational.  Nontoxic.    Scheduled Meds:bumetanide, 1 mg, Oral, Daily  cadexomer iodine, 1 application , Topical, Daily  cholecalciferol, 2,000 Units, Oral, Daily  epoetin joceline/joceline-epbx, 5,000 Units, Intravenous, Once per day on Tue Thu Sat  ferrous sulfate, 325 mg, Oral, Every Other Day  insulin lispro, 2-7 Units, Subcutaneous, 4x Daily AC & at Bedtime  multivitamin with minerals, 1 tablet, Oral, Daily  piperacillin-tazobactam, 3.375 g, Intravenous, Q12H  senna-docusate sodium, 2 tablet, Oral, BID  sodium chloride, 10 mL, Intravenous, Q12H      Continuous Infusions:sodium chloride, 9 mL/hr, Last Rate: 9 mL/hr (23 1604)        Vital signs in last 24 hours:  Temp:  [97.3 °F (36.3 °C)-98 °F (36.7 °C)] 97.3 °F (36.3 °C)  Heart Rate:  [60-70] 70  Resp:  [14-16] 16  BP: ()/(35-87) 104/42    Intake/Output:  No intake or output data in the 24 hours ending 23 1153    Exam:  /42   Pulse 70   Temp 97.3 °F (36.3 °C) (Oral)   Resp 16   Ht 157.5 cm (62\")   Wt 56.3 kg (124 lb 1.9 oz)   LMP  (LMP Unknown)   SpO2 96%   BMI 22.70 kg/m²     General Appearance:    Alert, cooperative/follows commands, AAOx3                         Throat:   Oral mucosa pink and moist                           Neck:   No JVD                         Lungs:    Clear to auscultation bilaterally, respirations unlabored                          Heart:    Regular rate and rhythm, S1 and S2 normal                  Abdomen:  "    Soft, postop ttp w/ mild distension, bowel sounds active, laparoscopic scars CDI                 Extremities:   No cyanosis or edema                        Pulses:   Pulses palpable in all extremities                  Neurologic:   CNII-XII intact       Data Review:  Labs in chart were reviewed.    Assessment:  Active Hospital Problems    Diagnosis  POA    **Acute cholecystitis [K81.0]  Yes    Diastolic CHF, chronic [I50.32]  Yes    Anemia [D64.9]  Yes    COPD (chronic obstructive pulmonary disease) [J44.9]  Yes    DM (diabetes mellitus) [E11.9]  Yes    ESRD (end stage renal disease) [N18.6]  Yes    History of colon cancer [Z85.038]  Yes    HTN (hypertension) [I10]  Yes    PAF (paroxysmal atrial fibrillation) [I48.0]  Yes    Peripheral neuropathy [G62.9]  Yes      Resolved Hospital Problems   No resolved problems to display.       Plan:    Acute cholecystitis/choledocholithiasis   -Status post laparoscopic cholecystectomy 7/12/2023 per    -Advance diet if no objection per surgery   -Elevated LFTs -GI consulted and awaiting recommendations in regards to +/-ERCP   -Leukocytosis resolving/afebrile   -IV Pepcid      ESRD/hyponatremia/ACD compounded by postoperative acute blood loss with Hgb at 10.2   -Per renal   -Hold Bumex for now since still n.p.o. compounded by some issues with hypotension   -on PO iron     Chronic diastolic CHF/PAF/HTN   -Seems euvolemic on exam   -RC with cardiology following    DM1 with PN/ESRD   -Currently n.p.o. and blood sugar staying in the 300 range.  I am holding getting more aggressive with basal bolus regimen until dietary advancement as noted per surgery and patient can tolerate.  Until then we will accept a higher level for now blood sugar        Addendum -patient received Narcan overnight.  IV Dilaudid further decreased to 0.25 mg until we can utilize p.o. once diet is advanced pending GI and ERCP    Rickie Bruce MD  7/13/2023  11:53 EDT

## 2023-07-13 NOTE — PROGRESS NOTES
"Nutrition Services    Patient Name:  Kalyn Mejia  YOB: 1945  MRN: [id number]  Admit Date:  7/10/2023    FOLLOW UP - CLINICAL NUTRITION    Assessment Date:  07/13/23    Comments: Patient is s/p POD 1 lap cholecystectomy and cholangiogram and  currently in ENDO for ERCP. Patient tolerated dialysis earlier today, no fluid removed. Plans to advance diet after ERCP per surgery.     RD recommends consistent carb, renal low phosphorus diet     Will continue to follow     Encounter Information         Reason for Encounter NPO/Clliq x 3 days     Current Issues Acute cholecystitis, CHF, calculus of gallbladder and bile ductwith acute cholecystitis, with obstruction      Current Nutrition Orders & Evaluation of Intake       Oral Nutrition     Current PO Diet NPO Diet NPO Type: Sips with Meds   Supplement n/a   PO Evaluation     % PO Intake     # of Days Evaluated     Factors Affecting Intake  altered GI function   --  Anthropometrics          Height    Weight Height: 157.5 cm (62\")  Weight: 56.2 kg (124 lb) (07/13/23 1655)    BMI kg/m2 Body mass index is 22.68 kg/m².  Normal/Healthy (18.4 - 24.9)    Weight trend Stable.  Documented weights    07/10/23 1007 07/12/23 0500 07/12/23 0753 07/13/23 0500   Weight: 55.3 kg (122 lb) 60 kg (132 lb 4.4 oz) 59.9 kg (132 lb) 56.3 kg (124 lb 1.9 oz)    07/13/23 1100 07/13/23 1655   Weight: 54.9 kg (121 lb 0.5 oz) 56.2 kg (124 lb)          Labs        Pertinent Labs Reviewed, listed below     Results from last 7 days   Lab Units 07/13/23  0713 07/12/23  0557 07/11/23  0622   SODIUM mmol/L 129* 135* 136   POTASSIUM mmol/L 4.8 4.3 4.5   CHLORIDE mmol/L 92* 96* 99   CO2 mmol/L 14.5* 21.8* 23.9   BUN mg/dL 40* 18 22   CREATININE mg/dL 4.55* 3.25* 4.72*   CALCIUM mg/dL 9.5 9.0 9.1   BILIRUBIN mg/dL 4.8* 5.8* 2.6*   ALK PHOS U/L 874* 879* 989*   ALT (SGPT) U/L 156* 181* 226*   AST (SGOT) U/L 77* 99* 250*   GLUCOSE mg/dL 322* 298* 79     Results from last 7 days   Lab Units " 07/13/23  0713 07/12/23  0557 07/11/23  0622   MAGNESIUM mg/dL  --   --  2.3   PHOSPHORUS mg/dL 5.2*  --  5.0*   HEMOGLOBIN g/dL 10.2*   < > 9.7*   HEMATOCRIT % 32.2*   < > 30.2*   WBC 10*3/mm3 11.53*   < > 5.08   ALBUMIN g/dL 3.2*   < > 3.0*    < > = values in this interval not displayed.     Results from last 7 days   Lab Units 07/13/23  0713 07/12/23  0558 07/11/23  0622 07/10/23  1043   PLATELETS 10*3/mm3 175 159 154 183     No results found for: COVID19  Lab Results   Component Value Date    HGBA1C 8.3 (H) 10/23/2022          Medications            Scheduled Medications cadexomer iodine, 1 application , Topical, Daily  cholecalciferol, 2,000 Units, Oral, Daily  epoetin joceline/joceline-epbx, 5,000 Units, Intravenous, Once per day on Tue Thu Sat  famotidine, 10 mg, Intravenous, Q12H  ferrous sulfate, 325 mg, Oral, Every Other Day  insulin lispro, 2-7 Units, Subcutaneous, 4x Daily AC & at Bedtime  multivitamin with minerals, 1 tablet, Oral, Daily  piperacillin-tazobactam, 3.375 g, Intravenous, Q12H  senna-docusate sodium, 2 tablet, Oral, BID  sodium chloride, 10 mL, Intravenous, Q12H        Infusions sodium chloride, 9 mL/hr, Last Rate: 9 mL/hr (07/12/23 1604)  sodium chloride, 30 mL/hr, Last Rate: 30 mL/hr (07/13/23 1659)        PRN Medications   acetaminophen **OR** acetaminophen **OR** acetaminophen    senna-docusate sodium **AND** polyethylene glycol **AND** bisacodyl **AND** bisacodyl    dextrose    dextrose    dextrose    dextrose    glucagon (human recombinant)    glucagon (human recombinant)    HYDROcodone-acetaminophen    HYDROmorphone    loperamide    nitroglycerin    ondansetron    [COMPLETED] Insert Peripheral IV **AND** sodium chloride    sodium chloride    sodium chloride    sodium chloride    sodium chloride     Physical Findings          Physical Appearance other: RN unable to assess alertness and orientation. Patient nods and follows simple commands but is very drowsy   Oral/Mouth Cavity tooth or teeth  missing   Edema  1+ (trace)   Gastrointestinal last bowel movement: 7/12   Skin  surgical incision   Tubes/Drains/Lines other:HD AV access    NFPE Not indicated at this time   --  NUTRITION INTERVENTION / PLAN OF CARE  Intervention Goal         Intervention Goal(s) Initiate feeding/diet and Maintain weight     Nutrition Intervention         RD Action Follow Tx Progress and Care plan reviewed     Nutrition Prescription         Diet Prescription     Supplement Prescription n/a   EN/PN Prescription    New Prescription Ordered? No changes at this time   --  Monitor/Evaluation        Monitor Per protocol, I&O, Pertinent labs, Weight, Skin status, GI status, Symptoms, POC/GOC   Discharge Needs Pending clinical course   Education Will instruct as appropriate   --    RD to follow up per protocol.    Electronically signed by:  Vanessa Beckett RD  07/13/23 18:05 EDT

## 2023-07-13 NOTE — ANESTHESIA PREPROCEDURE EVALUATION
Anesthesia Evaluation     NPO Solid Status: > 8 hours             Airway   Mallampati: III  TM distance: >3 FB  Neck ROM: full  Dental          Pulmonary - normal exam   (+) COPD,  Cardiovascular     Rate: normal    (+) dysrhythmias Paroxysmal Atrial Fib, CHF     ROS comment: Pulmonary hypertension    Neuro/Psych  (+) CVA  GI/Hepatic/Renal/Endo    (+) renal disease ESRD and dialysis, diabetes mellitus    Musculoskeletal     Abdominal    Substance History      OB/GYN          Other                      Anesthesia Plan    ASA 3     MAC       Anesthetic plan, risks, benefits, and alternatives have been provided, discussed and informed consent has been obtained with: patient.    CODE STATUS:    Code Status (Patient has no pulse and is not breathing): CPR (Attempt to Resuscitate)  Medical Interventions (Patient has pulse or is breathing): Full Support

## 2023-07-13 NOTE — PROGRESS NOTES
[facility]  Progress note: 2023    Patient Identification:  Name:Kalyn Mejia  Age:78 y.o.  Sex: female  :  1945  MRN: [id number]           CC:  Paroxysmal atrial fibrillation, coronary artery disease, mitral valve disease, pulmonary hypertension, preop clearance     Interval history:  Echo yesterday with normal systolic function RV dysfunction RV enlargement and RVSP of 71 mmHg noted. The mean mitral valve gradient has actually improved. Status laparoscopic robotic postcholecystectomy yesterday with removal of massive common bile duct stone.  Transaminases minimally less.  Blood pressure borderline low.  Currently in dialysis and no chest pain or shortness of breath.  Abdomen improved.  She states she is tired.    Vital Signs:   Temp:  [97.3 °F (36.3 °C)-98 °F (36.7 °C)] 97.3 °F (36.3 °C)  Heart Rate:  [60-70] 70  Resp:  [14-16] 16  BP: ()/(35-87) 104/42  No intake or output data in the 24 hours ending 23 0934    Physical Examination:    General Appearance No acute distress   Neck No adenopathy, supple, trachea midline, no thyromegaly, no carotid bruit, no JVD   Lungs Clear to auscultation,respirations regular, even and unlabored   Heart Regular rhythm and normal rate, normal S1 and S2, 1/6 systolic murmur, no gallop, no rub, no click   Chest wall No abnormalities observed   Abdomen Normal bowel sounds, no masses, no hepatomegaly, soft   Extremities Moves all extremities well, no edema, no cyanosis, no redness   Neurological Alert and oriented x 3 but sleepy     Lab Review:  Personally reviewed the labs, radiology imaging and other cardiac procedures.   Results from last 7 days   Lab Units 23  0713   SODIUM mmol/L 129*   POTASSIUM mmol/L 4.8   CHLORIDE mmol/L 92*   CO2 mmol/L 14.5*   BUN mg/dL 40*   CREATININE mg/dL 4.55*   CALCIUM mg/dL 9.5   BILIRUBIN mg/dL 4.8*   ALK PHOS U/L 874*   ALT (SGPT) U/L 156*   AST (SGOT) U/L 77*   GLUCOSE mg/dL 322*     Results from last 7  days   Lab Units 07/10/23  1535 07/10/23  1351 07/10/23  1044   HSTROP T ng/L 209* 227* 225*     Results from last 7 days   Lab Units 07/13/23  0713 07/12/23  0558 07/11/23  0622   WBC 10*3/mm3 11.53* 10.09 5.08   HEMOGLOBIN g/dL 10.2* 9.9* 9.7*   HEMATOCRIT % 32.2* 31.2* 30.2*   PLATELETS 10*3/mm3 175 159 154         Medication Review:   Meds reviewed  Scheduled Meds:bumetanide, 1 mg, Oral, Daily  cadexomer iodine, 1 application , Topical, Daily  cholecalciferol, 2,000 Units, Oral, Daily  epoetin joceline/joceline-epbx, 5,000 Units, Intravenous, Once per day on Tue Thu Sat  ferrous sulfate, 325 mg, Oral, Every Other Day  insulin lispro, 2-7 Units, Subcutaneous, 4x Daily AC & at Bedtime  multivitamin with minerals, 1 tablet, Oral, Daily  piperacillin-tazobactam, 3.375 g, Intravenous, Q12H  senna-docusate sodium, 2 tablet, Oral, BID  sodium chloride, 10 mL, Intravenous, Q12H      Continuous Infusions:sodium chloride, 9 mL/hr, Last Rate: 9 mL/hr (07/12/23 1604)      I personally viewed and interpreted the patient's EKG/Telemetry data    Assessment and Plan  1.  Acute cholecystitis, status post cholecystectomy.  Hopefully can resume anticoagulation by tomorrow.   2.  Elevated troponin this is flat and likely due to underlying renal disease and infectious process.  Treat medically.  No anginal symptoms.  3.  Paroxysmal atrial fibrillation.  Currently in sinus rhythm.  Eliquis on hold.  Currently off carvedilol due to relative hypotension. DC Bumex and attempt lower dose carvedilol once blood pressure at home improved hopefully by tomorrow.  4.  Mitral valve regurgitation, status post repair with normal function by echo yesterday.  5.  Coronary artery disease with modest LAD stenosis by cath 2/2023.  No anginal symptoms  6.  CHF, diuresis per nephrology with dialysis  7.  Severe pulmonary hypertension.  Mean PA gradient of 41 mmHg noted by cath 2/2023 and by echo RVSP 65.  Echo yesterday still with significant pulmonary  hypertension with RVSP 71 mmHg.  Clinically she remains stable.25  8.  Hypertension, slightly low.  9.  End-stage renal disease, on dialysis  10.  Diabetes  11.  Recent debridement of large callus.       Mini Nickerson  7/13/202309:34 EDT  25min spent in reviewing records, discussion and examination of the patient and discussion with other members of the patient's medical team.     Dictated utilizing Dragon dictation

## 2023-07-14 LAB
ALBUMIN SERPL-MCNC: 3 G/DL (ref 3.5–5.2)
ALBUMIN/GLOB SERPL: 1.1 G/DL
ALP SERPL-CCNC: 927 U/L (ref 39–117)
ALT SERPL W P-5'-P-CCNC: 120 U/L (ref 1–33)
ANION GAP SERPL CALCULATED.3IONS-SCNC: 18.3 MMOL/L (ref 5–15)
AST SERPL-CCNC: 54 U/L (ref 1–32)
BILIRUB SERPL-MCNC: 3.1 MG/DL (ref 0–1.2)
BUN SERPL-MCNC: 24 MG/DL (ref 8–23)
BUN/CREAT SERPL: 8.2 (ref 7–25)
CALCIUM SPEC-SCNC: 9.4 MG/DL (ref 8.6–10.5)
CHLORIDE SERPL-SCNC: 96 MMOL/L (ref 98–107)
CO2 SERPL-SCNC: 18.7 MMOL/L (ref 22–29)
CREAT SERPL-MCNC: 2.92 MG/DL (ref 0.57–1)
DEPRECATED RDW RBC AUTO: 49.2 FL (ref 37–54)
EGFRCR SERPLBLD CKD-EPI 2021: 16 ML/MIN/1.73
ERYTHROCYTE [DISTWIDTH] IN BLOOD BY AUTOMATED COUNT: 14.7 % (ref 12.3–15.4)
GLOBULIN UR ELPH-MCNC: 2.8 GM/DL
GLUCOSE BLDC GLUCOMTR-MCNC: 248 MG/DL (ref 70–130)
GLUCOSE BLDC GLUCOMTR-MCNC: 269 MG/DL (ref 70–130)
GLUCOSE BLDC GLUCOMTR-MCNC: 273 MG/DL (ref 70–130)
GLUCOSE BLDC GLUCOMTR-MCNC: 344 MG/DL (ref 70–130)
GLUCOSE SERPL-MCNC: 292 MG/DL (ref 65–99)
HBA1C MFR BLD: 7.8 % (ref 4.8–5.6)
HCT VFR BLD AUTO: 31.6 % (ref 34–46.6)
HGB BLD-MCNC: 9.9 G/DL (ref 12–15.9)
LAB AP CASE REPORT: NORMAL
MCH RBC QN AUTO: 28.7 PG (ref 26.6–33)
MCHC RBC AUTO-ENTMCNC: 31.3 G/DL (ref 31.5–35.7)
MCV RBC AUTO: 91.6 FL (ref 79–97)
PATH REPORT.FINAL DX SPEC: NORMAL
PATH REPORT.GROSS SPEC: NORMAL
PLATELET # BLD AUTO: 156 10*3/MM3 (ref 140–450)
PMV BLD AUTO: 11.3 FL (ref 6–12)
POTASSIUM SERPL-SCNC: 4.2 MMOL/L (ref 3.5–5.2)
PROT SERPL-MCNC: 5.8 G/DL (ref 6–8.5)
RBC # BLD AUTO: 3.45 10*6/MM3 (ref 3.77–5.28)
SODIUM SERPL-SCNC: 133 MMOL/L (ref 136–145)
WBC NRBC COR # BLD: 6.65 10*3/MM3 (ref 3.4–10.8)

## 2023-07-14 PROCEDURE — 99024 POSTOP FOLLOW-UP VISIT: CPT | Performed by: SURGERY

## 2023-07-14 PROCEDURE — 80053 COMPREHEN METABOLIC PANEL: CPT | Performed by: HOSPITALIST

## 2023-07-14 PROCEDURE — 83036 HEMOGLOBIN GLYCOSYLATED A1C: CPT | Performed by: HOSPITALIST

## 2023-07-14 PROCEDURE — 99231 SBSQ HOSP IP/OBS SF/LOW 25: CPT | Performed by: NURSE PRACTITIONER

## 2023-07-14 PROCEDURE — 82948 REAGENT STRIP/BLOOD GLUCOSE: CPT

## 2023-07-14 PROCEDURE — 85027 COMPLETE CBC AUTOMATED: CPT | Performed by: HOSPITALIST

## 2023-07-14 PROCEDURE — 25010000002 PIPERACILLIN SOD-TAZOBACTAM PER 1 G: Performed by: SURGERY

## 2023-07-14 PROCEDURE — 99232 SBSQ HOSP IP/OBS MODERATE 35: CPT | Performed by: NURSE PRACTITIONER

## 2023-07-14 PROCEDURE — 63710000001 INSULIN LISPRO (HUMAN) PER 5 UNITS: Performed by: SURGERY

## 2023-07-14 RX ORDER — SODIUM BICARBONATE 650 MG/1
650 TABLET ORAL 4 TIMES DAILY
Status: DISCONTINUED | OUTPATIENT
Start: 2023-07-14 | End: 2023-07-18

## 2023-07-14 RX ADMIN — INSULIN LISPRO 2 UNITS: 100 INJECTION, SOLUTION INTRAVENOUS; SUBCUTANEOUS at 21:21

## 2023-07-14 RX ADMIN — SODIUM BICARBONATE 650 MG: 650 TABLET ORAL at 17:07

## 2023-07-14 RX ADMIN — MULTIPLE VITAMINS W/ MINERALS TAB 1 TABLET: TAB at 10:00

## 2023-07-14 RX ADMIN — FERROUS SULFATE TAB 325 MG (65 MG ELEMENTAL FE) 325 MG: 325 (65 FE) TAB at 10:00

## 2023-07-14 RX ADMIN — HYDROCODONE BITARTRATE AND ACETAMINOPHEN 1 TABLET: 7.5; 325 TABLET ORAL at 03:44

## 2023-07-14 RX ADMIN — INSULIN LISPRO 4 UNITS: 100 INJECTION, SOLUTION INTRAVENOUS; SUBCUTANEOUS at 11:37

## 2023-07-14 RX ADMIN — Medication 1 APPLICATION: at 10:01

## 2023-07-14 RX ADMIN — Medication 10 ML: at 10:01

## 2023-07-14 RX ADMIN — Medication 10 ML: at 21:22

## 2023-07-14 RX ADMIN — FAMOTIDINE 10 MG: 10 INJECTION INTRAVENOUS at 21:21

## 2023-07-14 RX ADMIN — PIPERACILLIN SODIUM AND TAZOBACTAM SODIUM 3.38 G: 3; .375 INJECTION, SOLUTION INTRAVENOUS at 04:54

## 2023-07-14 RX ADMIN — Medication 2000 UNITS: at 10:00

## 2023-07-14 RX ADMIN — SODIUM BICARBONATE 650 MG: 650 TABLET ORAL at 21:21

## 2023-07-14 RX ADMIN — INSULIN LISPRO 3 UNITS: 100 INJECTION, SOLUTION INTRAVENOUS; SUBCUTANEOUS at 17:07

## 2023-07-14 RX ADMIN — FAMOTIDINE 10 MG: 10 INJECTION INTRAVENOUS at 10:00

## 2023-07-14 RX ADMIN — INSULIN GLARGINE-YFGN 8 UNITS: 100 INJECTION, SOLUTION SUBCUTANEOUS at 11:37

## 2023-07-14 RX ADMIN — SENNOSIDES AND DOCUSATE SODIUM 2 TABLET: 50; 8.6 TABLET ORAL at 21:22

## 2023-07-14 NOTE — PROGRESS NOTES
Gastroenterology   Inpatient Progress Note    Reason for Follow Up: Choledocholithiasis    Subjective  Interval History:   Status post ERCP day 1, patient states abdominal pain is less, tolerating oral intake    Current Facility-Administered Medications:     acetaminophen (TYLENOL) tablet 650 mg, 650 mg, Oral, Q4H PRN **OR** acetaminophen (TYLENOL) 160 MG/5ML solution 650 mg, 650 mg, Oral, Q4H PRN **OR** acetaminophen (TYLENOL) suppository 650 mg, 650 mg, Rectal, Q4H PRN, Rolando Ivey MD    sennosides-docusate (PERICOLACE) 8.6-50 MG per tablet 2 tablet, 2 tablet, Oral, BID, 2 tablet at 07/11/23 0809 **AND** polyethylene glycol (MIRALAX) packet 17 g, 17 g, Oral, Daily PRN **AND** bisacodyl (DULCOLAX) EC tablet 5 mg, 5 mg, Oral, Daily PRN **AND** bisacodyl (DULCOLAX) suppository 10 mg, 10 mg, Rectal, Daily PRN, Rolando Ivey MD    cadexomer iodine (IODOSORB) 0.9 % gel 1 application , 1 application , Topical, Daily, Rolando Ivey MD, 1 application  at 07/14/23 1001    cholecalciferol (VITAMIN D3) tablet 2,000 Units, 2,000 Units, Oral, Daily, Rolando Ivey MD, 2,000 Units at 07/14/23 1000    dextrose (D50W) (25 g/50 mL) IV injection 25 g, 25 g, Intravenous, Q15 Min PRN, Rolando Ivey MD, 25 g at 07/11/23 2104    dextrose (D50W) (25 g/50 mL) IV injection 25 g, 25 g, Intravenous, Q15 Min PRN, Rolando Ivey MD    dextrose (GLUTOSE) oral gel 15 g, 15 g, Oral, Q15 Min PRN, Rolando Ivey MD    dextrose (GLUTOSE) oral gel 15 g, 15 g, Oral, Q15 Min PRN, Rolando Ivey MD    epoetin joceline-epbx (RETACRIT) injection 5,000 Units, 5,000 Units, Intravenous, Once per day on Tue Thu Sat, Rolando Ivey MD, 5,000 Units at 07/13/23 1100    famotidine (PEPCID) injection 10 mg, 10 mg, Intravenous, Q12H, Rickie Bruce MD, 10 mg at 07/14/23 1000    ferrous sulfate tablet 325 mg, 325 mg, Oral, Every Other Day, Rolando Ivey MD, 325 mg at 07/14/23 1000    glucagon (GLUCAGEN) injection 1  mg, 1 mg, Subcutaneous, Q15 Min PRN, Rolando Ivey MD    glucagon (GLUCAGEN) injection 1 mg, 1 mg, Intramuscular, Q15 Min PRN, Rolando Ivey MD    HYDROcodone-acetaminophen (NORCO) 7.5-325 MG per tablet 1 tablet, 1 tablet, Oral, Q4H PRN, Rolando Ivey MD, 1 tablet at 07/14/23 0344    insulin glargine (LANTUS, SEMGLEE) injection 8 Units, 8 Units, Subcutaneous, Daily, Rickie Bruce MD, 8 Units at 07/14/23 1137    insulin lispro (HUMALOG/ADMELOG) injection 2-7 Units, 2-7 Units, Subcutaneous, 4x Daily AC & at Bedtime, Rolando Ivey MD, 4 Units at 07/14/23 1137    loperamide (IMODIUM) capsule 2 mg, 2 mg, Oral, 4x Daily PRN, Rolando Ivey MD    multivitamin with minerals 1 tablet, 1 tablet, Oral, Daily, Rolando Ivey MD, 1 tablet at 07/14/23 1000    nitroglycerin (NITROSTAT) SL tablet 0.4 mg, 0.4 mg, Sublingual, Q5 Min PRN, Rolando Ivey MD    ondansetron (ZOFRAN) injection 4 mg, 4 mg, Intravenous, Q6H PRN, Rolando Ivey MD, 4 mg at 07/12/23 1610    sodium bicarbonate tablet 650 mg, 650 mg, Oral, 4x Daily, Marv Carroll MD    [COMPLETED] Insert Peripheral IV, , , Once **AND** sodium chloride 0.9 % flush 10 mL, 10 mL, Intravenous, PRN, Rolando Ivey MD    sodium chloride 0.9 % flush 10 mL, 10 mL, Intravenous, Q12H, Rolando Ivey MD, 10 mL at 07/14/23 1001    sodium chloride 0.9 % flush 10 mL, 10 mL, Intravenous, PRN, Rolando Ivey MD    sodium chloride 0.9 % infusion 40 mL, 40 mL, Intravenous, PRN, Rolando Ivey MD    sodium chloride 0.9 % infusion, 9 mL/hr, Intravenous, Continuous PRN, Rolando Ivey MD, Last Rate: 9 mL/hr at 07/12/23 1604, Currently Infusing at 07/12/23 1604    sodium chloride 0.9 % infusion, 30 mL/hr, Intravenous, Continuous PRN, Pro Hooker MD, Last Rate: 30 mL/hr at 07/13/23 1659, 30 mL/hr at 07/13/23 1659  Review of Systems:                Gastroenterolgy positive for abdominal pain    Objective     Vital  Signs  Temp:  [97.3 °F (36.3 °C)-98.7 °F (37.1 °C)] 98.7 °F (37.1 °C)  Heart Rate:  [59-66] 66  Resp:  [14-16] 16  BP: ()/(42-52) 109/42  Body mass index is 23.19 kg/m².                  Physical Exam:              General: patient awake, alert and cooperative, no acute distress              Eyes: no scleral icterus              Skin: warm and dry, not jaundiced              Abdomen: soft, normal bowel sounds, appropriately tender postoperatively              Psychiatric: Appropriate affect and behavior                Results Review:                I reviewed the patient's new clinical results.    Results from last 7 days   Lab Units 07/14/23  0615 07/13/23  0713 07/12/23  0558   WBC 10*3/mm3 6.65 11.53* 10.09   HEMOGLOBIN g/dL 9.9* 10.2* 9.9*   HEMATOCRIT % 31.6* 32.2* 31.2*   PLATELETS 10*3/mm3 156 175 159     Results from last 7 days   Lab Units 07/14/23  0615 07/13/23  0713 07/12/23  0557   SODIUM mmol/L 133* 129* 135*   POTASSIUM mmol/L 4.2 4.8 4.3   CHLORIDE mmol/L 96* 92* 96*   CO2 mmol/L 18.7* 14.5* 21.8*   BUN mg/dL 24* 40* 18   CREATININE mg/dL 2.92* 4.55* 3.25*   CALCIUM mg/dL 9.4 9.5 9.0   BILIRUBIN mg/dL 3.1* 4.8* 5.8*   ALK PHOS U/L 927* 874* 879*   ALT (SGPT) U/L 120* 156* 181*   AST (SGOT) U/L 54* 77* 99*   GLUCOSE mg/dL 292* 322* 298*         Lab Results   Lab Value Date/Time    LIPASE 38 07/10/2023 1043       Radiology:  FL ercp biliary duct only   Final Result       As described.       This report was finalized on 7/13/2023 7:57 PM by Dr. Itz Roe M.D.          FL Cholangiogram Operative   Final Result      XR Chest 1 View   Final Result   Cardiomegaly with mild, decreased pulmonary vascular   congestion. Minimal likely atelectasis at the right base.       This report was finalized on 7/11/2023 7:41 PM by Dr. Itz Roe M.D.          US Abdomen Complete   Final Result      CT Abdomen Pelvis Without Contrast   Final Result   Limited in the absence of contrast and  extensive artifact   from patient's bilateral hip hardware.    1. Distended gallbladder with wall thickening and containing calculi.   There is mild intrahepatic and proximal common bile duct dilatation with   suggestion of choledocholithiasis. Correlation with serum bilirubin   alkaline phosphatase and further evaluation with MRCP as indicated.   2. CT findings of gastritis.   3. Constipation.       These findings were discussed with Dr. Corona by telephone at the time   of dictation.       Radiation dose reduction techniques were utilized, including automated   exposure control and exposure modulation based on body size.       This report was finalized on 7/11/2023 12:16 PM by Dr. Chavo Fry M.D.              Assessment & Plan     Active Hospital Problems    Diagnosis     **Acute cholecystitis     Diastolic CHF, chronic     Calculus of gallbladder and bile duct with acute cholecystitis, with obstruction     Anemia     COPD (chronic obstructive pulmonary disease)     DM (diabetes mellitus)     ESRD (end stage renal disease)     History of colon cancer     HTN (hypertension)     PAF (paroxysmal atrial fibrillation)     Peripheral neuropathy        Assessment:  Postop day 1 ERCP for choledocholithiasis  Elevated liver function test, trending down  Postop day 2 laparoscopic cholecystectomy      Plan:  GI will sign off, we are available if change in clinical course or he may be of any further assistance at any time.    I discussed the patients findings and my recommendations with patient, family, and nursing staff.           Kristina WHITE  [facility] [place]  [address] [place] [address]

## 2023-07-14 NOTE — PLAN OF CARE
Goal Outcome Evaluation:           Progress: improving  Outcome Evaluation: vss overnight on 1l o2. po pain medication given once for pain. IV abx.

## 2023-07-14 NOTE — PROGRESS NOTES
"    Patient Name: Kalyn Mejia  :1945  78 y.o.      Patient Care Team:  Provider, No Known as PCP - Edward Lundberg MD (Inactive) as PCP - Family Medicine    Chief Complaint: follow up pulmonary hypertension, PAF, history of MV repair.     Interval History: has some pain but over all feels ok. On room air. Reports her breathing is normal.        Objective   Vital Signs  Temp:  [97.3 °F (36.3 °C)-98.7 °F (37.1 °C)] 98.7 °F (37.1 °C)  Heart Rate:  [59-66] 66  Resp:  [14-16] 16  BP: ()/(40-52) 109/42    Intake/Output Summary (Last 24 hours) at 2023 1315  Last data filed at 2023 0605  Gross per 24 hour   Intake 360 ml   Output --   Net 360 ml     Flowsheet Rows      Flowsheet Row First Filed Value   Admission Height 157.5 cm (62\") Documented at 07/10/2023 1007   Admission Weight 55.3 kg (122 lb) Documented at 07/10/2023 1007            Physical Exam:   General Appearance:    Alert, cooperative, in no acute distress   Lungs:     Clear to auscultation.  Normal respiratory effort and rate.      Heart:    Regular rhythm and normal rate, normal S1 and S2, no murmurs, gallops or rubs.     Chest Wall:    No abnormalities observed   Abdomen:     Soft, nontender, positive bowel sounds.     Extremities:   no cyanosis, clubbing or edema.  No marked joint deformities.  Adequate musculoskeletal strength.       Results Review:    Results from last 7 days   Lab Units 23  0615   SODIUM mmol/L 133*   POTASSIUM mmol/L 4.2   CHLORIDE mmol/L 96*   CO2 mmol/L 18.7*   BUN mg/dL 24*   CREATININE mg/dL 2.92*   GLUCOSE mg/dL 292*   CALCIUM mg/dL 9.4     Results from last 7 days   Lab Units 07/10/23  1535 07/10/23  1351 07/10/23  1044   HSTROP T ng/L 209* 227* 225*     Results from last 7 days   Lab Units 23  0615   WBC 10*3/mm3 6.65   HEMOGLOBIN g/dL 9.9*   HEMATOCRIT % 31.6*   PLATELETS 10*3/mm3 156         Results from last 7 days   Lab Units 23  0622   MAGNESIUM mg/dL 2.3                 "   Medication Review:   cadexomer iodine, 1 application , Topical, Daily  cholecalciferol, 2,000 Units, Oral, Daily  epoetin joceline/joceline-epbx, 5,000 Units, Intravenous, Once per day on Tue Thu Sat  famotidine, 10 mg, Intravenous, Q12H  ferrous sulfate, 325 mg, Oral, Every Other Day  insulin glargine, 8 Units, Subcutaneous, Daily  insulin lispro, 2-7 Units, Subcutaneous, 4x Daily AC & at Bedtime  multivitamin with minerals, 1 tablet, Oral, Daily  piperacillin-tazobactam, 3.375 g, Intravenous, Q12H  senna-docusate sodium, 2 tablet, Oral, BID  sodium bicarbonate, 650 mg, Oral, 4x Daily  sodium chloride, 10 mL, Intravenous, Q12H         sodium chloride, 9 mL/hr, Last Rate: 9 mL/hr (07/12/23 1604)  sodium chloride, 30 mL/hr, Last Rate: 30 mL/hr (07/13/23 1659)    Echo     : Left ventricular systolic function is hyperdynamic (EF > 70%). Calculated left ventricular EF = 73.1% Septal wall motion is abnormal. Systolic flattening of the interventricular septum consistent with right ventricle pressure overload. Normal left ventricular cavity size and wall thickness noted. Left ventricular diastolic function was indeterminate. Elevated left atrial pressure.    The right ventricular cavity is moderately dilated. Moderately reduced right ventricular systolic function noted    Left atrial volume is moderately increased. The right atrial cavity is severely dilated.    : No aortic valve regurgitation or stenosis is present. The aortic valve is abnormal in structure. There is mild thickening of the aortic valve    No mitral valve regurgitation or significant stenosis is present. Fixed posterior leaflet findings are consistent with surgical mitral valve repair.    Moderate tricuspid valve regurgitation is present. Estimated right ventricular systolic pressure from tricuspid regurgitation is markedly elevated (>55 mmHg). Calculated right ventricular systolic pressure from tricuspid regurgitation is 71 mmHg.       Assessment & Plan   1.  Acute cholecystitis status post lap katelyn 7/12 then ERCP 7/13  2. Paroxysmal atrial fibrillation - in SR , apixaban held. Restart when able.   3. Mitral valve regurgitation status post mitral valve repair  4. Chronic diastolic congestive heart failure, volume management per HD/nephro  5. Coronary artery disease, cardiac catheterization in February 2023 with moderate distal LAD disease and otherwise unremarkable.   6. Hypertension  7. Diabetes mellitus type I  8. End stage renal disease on HD  9. Recent debridement of large callus due to foreign body  10. Pulmonary hypertension RVSP 71 mmHg.     Cardiac status seems pretty stable. Will see prn.     CINDY Morales  [facility]  07/14/23  13:15 EDT

## 2023-07-14 NOTE — PROGRESS NOTES
"   LOS: 4 days     Chief Complaint/ Reason for encounter: ESRD, dialysis management    Subjective   07/12/23 : Patient is doing well today with no new complaints  N.p.o. for surgery  No shortness of breath chest pain or edema  Voiding well with no dysuria  Admitted yesterday with abdominal pain nausea and vomiting and acute cholecystitis    7/13: Complaining of some postop pain, status post lap katelyn and now needs ERCP    7/14: She had dialysis yesterday morning, tolerated well then ERCP with findings of multiple biliary stones status post sphincterotomy and stone extraction    Medical history reviewed:  History of Present Illness    Subjective    History taken from: Patient and chart    Vital Signs  Temp:  [97.3 °F (36.3 °C)-98.4 °F (36.9 °C)] 98.4 °F (36.9 °C)  Heart Rate:  [59-66] 66  Resp:  [14-16] 16  BP: ()/(40-52) 101/42       Wt Readings from Last 1 Encounters:   07/14/23 0605 57.5 kg (126 lb 12.2 oz)   07/13/23 1655 56.2 kg (124 lb)   07/13/23 1100 54.9 kg (121 lb 0.5 oz)   07/13/23 0500 56.3 kg (124 lb 1.9 oz)   07/12/23 0753 59.9 kg (132 lb)   07/12/23 0500 60 kg (132 lb 4.4 oz)   07/10/23 1007 55.3 kg (122 lb)       Objective:  Vital signs: (most recent): Blood pressure 101/42, pulse 66, temperature 98.4 °F (36.9 °C), temperature source Oral, resp. rate 16, height 157.5 cm (62\"), weight 57.5 kg (126 lb 12.2 oz), SpO2 90 %.              Objective:  General Appearance:  Comfortable, well-appearing, in no acute distress and not in pain.  Awake, alert, oriented  HEENT: Mucous membranes moist, no injury, oropharynx clear  Lungs:  Normal effort and normal respiratory rate.  Breath sounds clear to auscultation.  No  respiratory distress.  No rales, decreased breath sounds or rhonchi.    Heart: Normal rate.  Regular rhythm.  S1, S2 normal.  No murmur.   Abdomen: Abdomen is soft.  Bowel sounds are normal, no abdominal tenderness.  There is no rebound or guarding  Extremities: No edema of bilateral lower " extremities  Neurological: No focal motor or sensory deficits, pupils reactive  Skin:  Warm and dry.  No rash or cyanosis.       Results Review:    Intake/Output:     Intake/Output Summary (Last 24 hours) at 7/14/2023 1104  Last data filed at 7/14/2023 0605  Gross per 24 hour   Intake 360 ml   Output --   Net 360 ml           DATA:  Radiology and Labs:  The following labs independently reviewed by me. Additional labs ordered for tomorrow a.m.  Interval notes, chart personally reviewed by me.   New problems include elevated LFTs  Discussed with patient/family    Risk/ complexity of medical care/ medical decision making moderate risk, postop lap katelyn    Labs:   Recent Results (from the past 24 hour(s))   POC Glucose Once    Collection Time: 07/13/23 12:20 PM    Specimen: Blood   Result Value Ref Range    Glucose 135 (H) 70 - 130 mg/dL   POC Glucose Once    Collection Time: 07/13/23  4:36 PM    Specimen: Blood   Result Value Ref Range    Glucose 173 (H) 70 - 130 mg/dL   POC Glucose Once    Collection Time: 07/13/23  9:48 PM    Specimen: Blood   Result Value Ref Range    Glucose 204 (H) 70 - 130 mg/dL   POC Glucose Once    Collection Time: 07/14/23  6:04 AM    Specimen: Blood   Result Value Ref Range    Glucose 273 (H) 70 - 130 mg/dL   Comprehensive Metabolic Panel    Collection Time: 07/14/23  6:15 AM    Specimen: Blood   Result Value Ref Range    Glucose 292 (H) 65 - 99 mg/dL    BUN 24 (H) 8 - 23 mg/dL    Creatinine 2.92 (H) 0.57 - 1.00 mg/dL    Sodium 133 (L) 136 - 145 mmol/L    Potassium 4.2 3.5 - 5.2 mmol/L    Chloride 96 (L) 98 - 107 mmol/L    CO2 18.7 (L) 22.0 - 29.0 mmol/L    Calcium 9.4 8.6 - 10.5 mg/dL    Total Protein 5.8 (L) 6.0 - 8.5 g/dL    Albumin 3.0 (L) 3.5 - 5.2 g/dL    ALT (SGPT) 120 (H) 1 - 33 U/L    AST (SGOT) 54 (H) 1 - 32 U/L    Alkaline Phosphatase 927 (H) 39 - 117 U/L    Total Bilirubin 3.1 (H) 0.0 - 1.2 mg/dL    Globulin 2.8 gm/dL    A/G Ratio 1.1 g/dL    BUN/Creatinine Ratio 8.2 7.0 - 25.0     Anion Gap 18.3 (H) 5.0 - 15.0 mmol/L    eGFR 16.0 (L) >60.0 mL/min/1.73   CBC (No Diff)    Collection Time: 07/14/23  6:15 AM    Specimen: Blood   Result Value Ref Range    WBC 6.65 3.40 - 10.80 10*3/mm3    RBC 3.45 (L) 3.77 - 5.28 10*6/mm3    Hemoglobin 9.9 (L) 12.0 - 15.9 g/dL    Hematocrit 31.6 (L) 34.0 - 46.6 %    MCV 91.6 79.0 - 97.0 fL    MCH 28.7 26.6 - 33.0 pg    MCHC 31.3 (L) 31.5 - 35.7 g/dL    RDW 14.7 12.3 - 15.4 %    RDW-SD 49.2 37.0 - 54.0 fl    MPV 11.3 6.0 - 12.0 fL    Platelets 156 140 - 450 10*3/mm3       Radiology:  Pertinent radiology studies were reviewed as described above      Medications have been reviewed separately in chart overview      ASSESSMENT:  ESRD, will keep on a TTS dialysis schedule this admission due to surgery today  Cholecystitis, cholecystectomy planned for today  Anemia of CKD  Hyperphosphatemia   HTN  Elevated transaminase levels due to acute cholecystitis  Metabolic acidosis  Choledocholithiasis status post ERCP/sphincterotomy/stone extraction      Plan:  Continue dialysis Tuesday Thursday Saturday   oral intake has been low, BP marginal.  No UF with dialysis tomorrow  Add oral sodium bicarb  BP meds and diuretics on hold due to hypotension  Continue vitamin D    IV antibiotics, renally dosed for GFR less than 15  Follow-up a.m. labs        Marv Carroll MD   [facility]   Office phone number: [phone]  Answering service phone number: [phone]    07/14/23  11:04 EDT    Dictation performed using Dragon dictation software

## 2023-07-14 NOTE — SIGNIFICANT NOTE
07/14/23 1342   OTHER   Discipline physical therapist   Rehab Time/Intention   Session Not Performed other (see comments)  (attempted to see this afternoon, however, pt declines. States  she is in pain. Educated and encouraged pt, but she continues to decline at this time.)   Recommendation   PT - Next Appointment 07/17/23

## 2023-07-14 NOTE — PROGRESS NOTES
Pod 2 lap katelyn/day 1 ERCP    Feels better, tolerating some diet, pain improved    Afeb,vss  Gen: nad, chronic ill appearance  Eyes: improving icterus  Abd: soft, appropriate tender    Hgb stable, tbili improving    A/p  -pod 2 lap katelyn/ioc  -day 1 ercp for choledocholithiasis  -diet as tolerated  -ok for oral AC from my standpoint  -ok for discharge when felt to be medically appropriate  -f/u with me 2 weeks in office    Rolando Ivey MD  General and Endoscopic Surgery  [facility]    [address], [address]  [place], [place], [address]  P: [phone]  F: [phone]

## 2023-07-14 NOTE — PROGRESS NOTES
"    DAILY PROGRESS NOTE  [facility]    Patient Identification:  Name: Kalyn Mejia  Age: 78 y.o.  Sex: female  :  1945  MRN: [id number]         Primary Care Physician: Provider, No Known    Subjective:  Interval History: Admits to abdominal discomfort.  Denies any emesis chest pain or troubles breathing.  She denies any altered mentation.    Objective: Was laying in bed completely naked upon entering room.  I grabbed the patient a blanket and covered her up.  I anticipated encephalopathy though she was completely alert and oriented x3 but seemingly a little bit sedated from postoperative anesthesia and pain meds    Scheduled Meds:cadexomer iodine, 1 application , Topical, Daily  cholecalciferol, 2,000 Units, Oral, Daily  epoetin joceline/joceline-epbx, 5,000 Units, Intravenous, Once per day on Tue Thu Sat  famotidine, 10 mg, Intravenous, Q12H  ferrous sulfate, 325 mg, Oral, Every Other Day  insulin lispro, 2-7 Units, Subcutaneous, 4x Daily AC & at Bedtime  multivitamin with minerals, 1 tablet, Oral, Daily  piperacillin-tazobactam, 3.375 g, Intravenous, Q12H  senna-docusate sodium, 2 tablet, Oral, BID  sodium chloride, 10 mL, Intravenous, Q12H      Continuous Infusions:sodium chloride, 9 mL/hr, Last Rate: 9 mL/hr (23 1604)  sodium chloride, 30 mL/hr, Last Rate: 30 mL/hr (23 1659)        Vital signs in last 24 hours:  Temp:  [97.3 °F (36.3 °C)-98.4 °F (36.9 °C)] 98.4 °F (36.9 °C)  Heart Rate:  [59-66] 66  Resp:  [14-16] 16  BP: ()/(40-52) 101/42    Intake/Output:    Intake/Output Summary (Last 24 hours) at 2023 1000  Last data filed at 2023 0605  Gross per 24 hour   Intake 360 ml   Output 0 ml   Net 360 ml       Exam:  /42 (BP Location: Left arm, Patient Position: Lying)   Pulse 66   Temp 98.4 °F (36.9 °C) (Oral)   Resp 16   Ht 157.5 cm (62\")   Wt 57.5 kg (126 lb 12.2 oz)   LMP  (LMP Unknown)   SpO2 90%   BMI 23.19 kg/m²     General Appearance:    Alert with " mild sedation otherwise alert oriented x3, cooperative/follows commands                         Throat:   Oral mucosa pink and moist                           Neck:   No JVD                         Lungs:    Decreased bases otherwise clear to auscultation bilaterally, respirations unlabored                          Heart:    Regular rate and rhythm, S1 and S2 normal                  Abdomen:     Soft, lap scars CDI, mild distention, bowel sounds noted                 Extremities: Moving all while in bed, atraumatic, no cyanosis or edema                        Pulses:   Pulses palpable in all extremities                            Skin:   Skin is warm and dry, nonjaundiced                  Neurologic:   CNII-XII intact     Data Review:  Labs in chart were reviewed.    Assessment:  Active Hospital Problems    Diagnosis  POA    **Acute cholecystitis [K81.0]  Yes    Diastolic CHF, chronic [I50.32]  Yes    Calculus of gallbladder and bile duct with acute cholecystitis, with obstruction [K80.63]  Unknown    Anemia [D64.9]  Yes    COPD (chronic obstructive pulmonary disease) [J44.9]  Yes    DM (diabetes mellitus) [E11.9]  Yes    ESRD (end stage renal disease) [N18.6]  Yes    History of colon cancer [Z85.038]  Yes    HTN (hypertension) [I10]  Yes    PAF (paroxysmal atrial fibrillation) [I48.0]  Yes    Peripheral neuropathy [G62.9]  Yes      Resolved Hospital Problems   No resolved problems to display.       Plan:    Acute cholecystitis/choledocholithiasis   -Status post laparoscopic cholecystectomy 7/12/2023 per    -Status post ERCP per Dr. Hooker on 7/13/2023   -Dewey advancement per surgery   -Elevated LFTs trending down   -Leukocytosis resolved/afebrile   -IV Pepcid   -Abdominal pain with transition to p.o. control -Dilaudid DC'd   -Anemia of chronic disease compounded by acute blood loss-Hgb 10.2-9.9   -No opposition to DC Zosyn from my perspective if no objection per surgery or GI      ESRD/hyponatremia/ACD                -Per renal              -Hold Bumex for now since still n.p.o. compounded by some issues with hypotension              -on PO iron      Chronic diastolic CHF/PAF/HTN              -Euvolemic on exam              -RC with cardiology following     DM1 with PN/ESRD              -Blood sugars decent though remain elevated   -Dietary advanced, will restart long-acting insulin with parameters in addition to sliding scale but use a lower dose of only 8 units   -Sugars trending 200s        Disposition -pending dietary tolerance of advancement of diet, I think we are getting closer to moving out of the hospital.  CCP coordinating discharge needs    Rickie Bruce MD  7/14/2023  10:00 EDT

## 2023-07-14 NOTE — CASE MANAGEMENT/SOCIAL WORK
"Continued Stay Note  [facility]     Patient Name: Kalyn Mejia  MRN: [id number]  Today's Date: 7/14/2023    Admit Date: 7/10/2023    Plan: Return home with family and OP HD vs. SNF   Discharge Plan       Row Name 07/14/23 1425       Plan    Plan Return home with family and OP HD vs. SNF    Plan Comments Spoke with patient at bedside.  Asked her if she thinks she needs SNF at [place] - she states \"maybe\" and to speak with her sister Harika.  Call to Harika and left message with request for return call.  Also spoke with sister Taisha.  She states they will discuss and talk with her daughter (who is out of town) about possible rehab at [place].  Will leave information in room and she will be here later today.  CCP following.  Merry SLADE                 Expected Discharge Date and Time       Expected Discharge Date Expected Discharge Time    Jul 17, 2023               Becky S. [place], RN    "

## 2023-07-15 LAB
ALBUMIN SERPL-MCNC: 3 G/DL (ref 3.5–5.2)
ANION GAP SERPL CALCULATED.3IONS-SCNC: 15.4 MMOL/L (ref 5–15)
BACTERIA SPEC AEROBE CULT: NORMAL
BACTERIA SPEC AEROBE CULT: NORMAL
BUN SERPL-MCNC: 36 MG/DL (ref 8–23)
BUN/CREAT SERPL: 8.3 (ref 7–25)
CALCIUM SPEC-SCNC: 9.8 MG/DL (ref 8.6–10.5)
CHLORIDE SERPL-SCNC: 96 MMOL/L (ref 98–107)
CO2 SERPL-SCNC: 20.6 MMOL/L (ref 22–29)
CREAT SERPL-MCNC: 4.35 MG/DL (ref 0.57–1)
EGFRCR SERPLBLD CKD-EPI 2021: 9.9 ML/MIN/1.73
GLUCOSE BLDC GLUCOMTR-MCNC: 116 MG/DL (ref 70–130)
GLUCOSE BLDC GLUCOMTR-MCNC: 224 MG/DL (ref 70–130)
GLUCOSE BLDC GLUCOMTR-MCNC: 313 MG/DL (ref 70–130)
GLUCOSE SERPL-MCNC: 248 MG/DL (ref 65–99)
HCT VFR BLD AUTO: 32.7 % (ref 34–46.6)
HGB BLD-MCNC: 10.4 G/DL (ref 12–15.9)
PHOSPHATE SERPL-MCNC: 3.5 MG/DL (ref 2.5–4.5)
POTASSIUM SERPL-SCNC: 4.2 MMOL/L (ref 3.5–5.2)
SODIUM SERPL-SCNC: 132 MMOL/L (ref 136–145)

## 2023-07-15 PROCEDURE — 85018 HEMOGLOBIN: CPT | Performed by: HOSPITALIST

## 2023-07-15 PROCEDURE — 85014 HEMATOCRIT: CPT | Performed by: HOSPITALIST

## 2023-07-15 PROCEDURE — 63710000001 INSULIN LISPRO (HUMAN) PER 5 UNITS: Performed by: SURGERY

## 2023-07-15 PROCEDURE — 25010000002 EPOETIN ALFA-EPBX 3000 UNIT/ML SOLUTION: Performed by: SURGERY

## 2023-07-15 PROCEDURE — 82948 REAGENT STRIP/BLOOD GLUCOSE: CPT

## 2023-07-15 PROCEDURE — 80069 RENAL FUNCTION PANEL: CPT | Performed by: INTERNAL MEDICINE

## 2023-07-15 RX ORDER — CETIRIZINE HYDROCHLORIDE 10 MG/1
5 TABLET ORAL DAILY
Status: DISCONTINUED | OUTPATIENT
Start: 2023-07-15 | End: 2023-07-18 | Stop reason: HOSPADM

## 2023-07-15 RX ORDER — FAMOTIDINE 20 MG/1
20 TABLET, FILM COATED ORAL
Status: DISCONTINUED | OUTPATIENT
Start: 2023-07-15 | End: 2023-07-18 | Stop reason: HOSPADM

## 2023-07-15 RX ADMIN — Medication 2000 UNITS: at 08:45

## 2023-07-15 RX ADMIN — ACETAMINOPHEN 650 MG: 325 TABLET, FILM COATED ORAL at 00:55

## 2023-07-15 RX ADMIN — INSULIN GLARGINE-YFGN 8 UNITS: 100 INJECTION, SOLUTION SUBCUTANEOUS at 08:45

## 2023-07-15 RX ADMIN — SENNOSIDES AND DOCUSATE SODIUM 2 TABLET: 50; 8.6 TABLET ORAL at 08:53

## 2023-07-15 RX ADMIN — EPOETIN ALFA-EPBX 5000 UNITS: 3000 INJECTION, SOLUTION INTRAVENOUS; SUBCUTANEOUS at 15:39

## 2023-07-15 RX ADMIN — Medication 10 ML: at 08:45

## 2023-07-15 RX ADMIN — Medication 1 APPLICATION: at 08:46

## 2023-07-15 RX ADMIN — Medication 10 ML: at 20:50

## 2023-07-15 RX ADMIN — ACETAMINOPHEN 650 MG: 325 TABLET, FILM COATED ORAL at 08:53

## 2023-07-15 RX ADMIN — INSULIN LISPRO 4 UNITS: 100 INJECTION, SOLUTION INTRAVENOUS; SUBCUTANEOUS at 12:20

## 2023-07-15 RX ADMIN — SODIUM BICARBONATE 650 MG: 650 TABLET ORAL at 12:20

## 2023-07-15 RX ADMIN — APIXABAN 2.5 MG: 2.5 TABLET, FILM COATED ORAL at 12:20

## 2023-07-15 RX ADMIN — ACETAMINOPHEN 650 MG: 325 TABLET, FILM COATED ORAL at 15:38

## 2023-07-15 RX ADMIN — APIXABAN 2.5 MG: 2.5 TABLET, FILM COATED ORAL at 20:49

## 2023-07-15 RX ADMIN — INSULIN LISPRO 2 UNITS: 100 INJECTION, SOLUTION INTRAVENOUS; SUBCUTANEOUS at 08:45

## 2023-07-15 RX ADMIN — CETIRIZINE HYDROCHLORIDE 5 MG: 10 TABLET ORAL at 12:20

## 2023-07-15 RX ADMIN — MULTIPLE VITAMINS W/ MINERALS TAB 1 TABLET: TAB at 08:45

## 2023-07-15 RX ADMIN — ACETAMINOPHEN 650 MG: 325 TABLET, FILM COATED ORAL at 23:56

## 2023-07-15 RX ADMIN — SODIUM BICARBONATE 650 MG: 650 TABLET ORAL at 08:45

## 2023-07-15 RX ADMIN — FAMOTIDINE 10 MG: 10 INJECTION INTRAVENOUS at 08:57

## 2023-07-15 RX ADMIN — SODIUM BICARBONATE 650 MG: 650 TABLET ORAL at 20:49

## 2023-07-15 NOTE — PROGRESS NOTES
"   LOS: 5 days     Chief Complaint/ Reason for encounter: ESRD, dialysis management    Subjective   07/12/23 : Patient is doing well today with no new complaints  N.p.o. for surgery  No shortness of breath chest pain or edema  Voiding well with no dysuria  Admitted yesterday with abdominal pain nausea and vomiting and acute cholecystitis    7/13: Complaining of some postop pain, status post lap katelyn and now needs ERCP    7/14: She had dialysis yesterday morning, tolerated well then ERCP with findings of multiple biliary stones status post sphincterotomy and stone extraction    7/15: Seen on dialysis, tolerating treatment well.  She looks and feels much better and states her appetite is returning  No UF today, 3K bath.  Blood flow rate 350, dialysate flow rate 500.  Arterial and venous pressure around 140.  Discussed orders with dialysis RN, Uma Wheeler    Medical history reviewed:  History of Present Illness    Subjective    History taken from: Patient and chart    Vital Signs  Temp:  [97.6 °F (36.4 °C)-98.1 °F (36.7 °C)] 98.1 °F (36.7 °C)  Heart Rate:  [67-68] 67  Resp:  [16-18] 18  BP: (130-155)/(57-64) 130/58       Wt Readings from Last 1 Encounters:   07/15/23 0304 56.3 kg (124 lb 1.9 oz)   07/14/23 0605 57.5 kg (126 lb 12.2 oz)   07/13/23 1655 56.2 kg (124 lb)   07/13/23 1100 54.9 kg (121 lb 0.5 oz)   07/13/23 0500 56.3 kg (124 lb 1.9 oz)   07/12/23 0753 59.9 kg (132 lb)   07/12/23 0500 60 kg (132 lb 4.4 oz)   07/10/23 1007 55.3 kg (122 lb)       Objective:  Vital signs: (most recent): Blood pressure 130/58, pulse 67, temperature 98.1 °F (36.7 °C), temperature source Oral, resp. rate 18, height 157.5 cm (62\"), weight 56.3 kg (124 lb 1.9 oz), SpO2 94 %.              Objective:  General Appearance:  Comfortable, well-appearing, in no acute distress and not in pain.  Awake, alert, oriented  HEENT: Mucous membranes moist, no injury, oropharynx clear  Lungs:  Normal effort and normal respiratory rate.  Breath " sounds clear to auscultation.  No  respiratory distress.  No rales, decreased breath sounds or rhonchi.    Heart: Normal rate.  Regular rhythm.  S1, S2 normal.  No murmur.   Abdomen: Abdomen is soft.  Bowel sounds are normal, no abdominal tenderness.  There is no rebound or guarding  Extremities: No edema of bilateral lower extremities  Neurological: No focal motor or sensory deficits, pupils reactive  Skin:  Warm and dry.  No rash or cyanosis.       Results Review:    Intake/Output:   No intake or output data in the 24 hours ending 07/15/23 1807        DATA:  Radiology and Labs:  The following labs independently reviewed by me. Additional labs ordered for tomorrow a.m.  Interval notes, chart personally reviewed by me.   Discussed with patient/family    Risk/ complexity of medical care/ medical decision making moderate risk, postop lap katelyn    Labs:   Recent Results (from the past 24 hour(s))   POC Glucose Once    Collection Time: 07/14/23  9:02 PM    Specimen: Blood   Result Value Ref Range    Glucose 248 (H) 70 - 130 mg/dL   POC Glucose Once    Collection Time: 07/15/23  6:07 AM    Specimen: Blood   Result Value Ref Range    Glucose 224 (H) 70 - 130 mg/dL   Hemoglobin & Hematocrit, Blood    Collection Time: 07/15/23  7:10 AM    Specimen: Blood   Result Value Ref Range    Hemoglobin 10.4 (L) 12.0 - 15.9 g/dL    Hematocrit 32.7 (L) 34.0 - 46.6 %   Renal Function Panel    Collection Time: 07/15/23  7:10 AM    Specimen: Blood   Result Value Ref Range    Glucose 248 (H) 65 - 99 mg/dL    BUN 36 (H) 8 - 23 mg/dL    Creatinine 4.35 (H) 0.57 - 1.00 mg/dL    Sodium 132 (L) 136 - 145 mmol/L    Potassium 4.2 3.5 - 5.2 mmol/L    Chloride 96 (L) 98 - 107 mmol/L    CO2 20.6 (L) 22.0 - 29.0 mmol/L    Calcium 9.8 8.6 - 10.5 mg/dL    Albumin 3.0 (L) 3.5 - 5.2 g/dL    Phosphorus 3.5 2.5 - 4.5 mg/dL    Anion Gap 15.4 (H) 5.0 - 15.0 mmol/L    BUN/Creatinine Ratio 8.3 7.0 - 25.0    eGFR 9.9 (L) >60.0 mL/min/1.73   POC Glucose Once     Collection Time: 07/15/23 11:38 AM    Specimen: Blood   Result Value Ref Range    Glucose 313 (H) 70 - 130 mg/dL       Radiology:  Pertinent radiology studies were reviewed as described above      Medications have been reviewed separately in chart overview      ASSESSMENT:  ESRD, will keep on a TTS dialysis schedule this admission due to surgery today  Cholecystitis, cholecystectomy planned for today  Anemia of CKD  Hyperphosphatemia   HTN  Elevated transaminase levels due to acute cholecystitis  Metabolic acidosis  Choledocholithiasis status post ERCP/sphincterotomy/stone extraction      Plan:  Seen on dialysis, no UF.  Tolerating treatment well  Continue dialysis Tuesday Thursday Saturday schedule for now  Continue sodium bicarb  Holding BP meds and diuretics.  Continue to monitor blood pressure which seems to be rebounding a bit  Continue vitamin D    IV antibiotics, renally dosed for GFR less than 15  Follow-up a.m. labs        Marv Carroll MD   [facility]   Office phone number: [phone]  Answering service phone number: [phone]    07/15/23  18:07 EDT    Dictation performed using Dragon dictation software

## 2023-07-15 NOTE — PROGRESS NOTES
"    DAILY PROGRESS NOTE  [facility]    Patient Identification:  Name: Kalyn Mejia  Age: 78 y.o.  Sex: female  :  1945  MRN: [id number]         Primary Care Physician: Provider, No Known    Subjective:  Interval History:  Much better today.  Abdominal pain improved.  No nausea no vomiting and tolerating dietary advancement.  She denies any bowel movement today.  Otherwise passing gas.  Denies chest pain or troubles breathing    Objective: Clinically ooking much much better.  Approaching baseline.  More awake animated and conversational.  Certainly nontoxic    Scheduled Meds:apixaban, 2.5 mg, Oral, Q12H  cadexomer iodine, 1 application , Topical, Daily  cetirizine, 5 mg, Oral, Daily  cholecalciferol, 2,000 Units, Oral, Daily  epoetin joceline/joceline-epbx, 5,000 Units, Intravenous, Once per day on Tue Thu Sat  famotidine, 10 mg, Intravenous, Q12H  ferrous sulfate, 325 mg, Oral, Every Other Day  insulin glargine, 8 Units, Subcutaneous, Daily  insulin lispro, 2-7 Units, Subcutaneous, 4x Daily AC & at Bedtime  multivitamin with minerals, 1 tablet, Oral, Daily  senna-docusate sodium, 2 tablet, Oral, BID  sodium bicarbonate, 650 mg, Oral, 4x Daily  sodium chloride, 10 mL, Intravenous, Q12H      Continuous Infusions:sodium chloride, 9 mL/hr, Last Rate: 9 mL/hr (23 1604)  sodium chloride, 30 mL/hr, Last Rate: 30 mL/hr (23 1659)        Vital signs in last 24 hours:  Temp:  [97.6 °F (36.4 °C)-98.7 °F (37.1 °C)] 98 °F (36.7 °C)  Heart Rate:  [66-68] 67  Resp:  [16-18] 18  BP: (109-155)/(42-64) 155/64    Intake/Output:    Intake/Output Summary (Last 24 hours) at 7/15/2023 1107  Last data filed at 2023 1149  Gross per 24 hour   Intake 210 ml   Output --   Net 210 ml       Exam:  /64 (BP Location: Left arm, Patient Position: Lying)   Pulse 67   Temp 98 °F (36.7 °C) (Oral)   Resp 18   Ht 157.5 cm (62\")   Wt 56.3 kg (124 lb 1.9 oz)   LMP  (LMP Unknown)   SpO2 94%   BMI 22.70 kg/m² "     General Appearance:    Alert, cooperative, nontoxic, AAOx3                           Eyes:  No scleral icterus                         throat:   Oral mucosa pink and moist                           Neck:   No JVD                         Lungs:    Clear to auscultation bilaterally, respirations unlabored                          Heart:    Regular rate and rhythm, S1 and S2 normal                  Abdomen:     Soft, nontender except for anticipated postoperative, bowel sounds active, lap scars CDI                 Extremities: Generalized weakness though moving all, no cyanosis or edema                        Pulses:   Pulses palpable in all extremities                            Skin:   Skin is warm and dry, nonjaundiced                  neurologic:   CNII-XII intact     Data Review:  Labs in chart were reviewed.    Assessment:  Active Hospital Problems    Diagnosis  POA    **Acute cholecystitis [K81.0]  Yes    Diastolic CHF, chronic [I50.32]  Yes    Calculus of gallbladder and bile duct with acute cholecystitis, with obstruction [K80.63]  Unknown    Anemia [D64.9]  Yes    COPD (chronic obstructive pulmonary disease) [J44.9]  Yes    DM (diabetes mellitus) [E11.9]  Yes    ESRD (end stage renal disease) [N18.6]  Yes    History of colon cancer [Z85.038]  Yes    HTN (hypertension) [I10]  Yes    PAF (paroxysmal atrial fibrillation) [I48.0]  Yes    Peripheral neuropathy [G62.9]  Yes      Resolved Hospital Problems   No resolved problems to display.       Plan:    Acute cholecystitis/choledocholithiasis              -Status post laparoscopic cholecystectomy 7/12/2023 per               -Status post ERCP per Dr. Hooker on 7/13/2023              -Tolerating dietary advancement              -Elevated LFTs trended down              -Leukocytosis resolved/afebrile              -Pepcid-switch to p.o.              -Abdominal pain with transition to p.o. control               -Anemia of chronic disease compounded by  acute blood loss-Hgb 10.2-9.9-10.4              -Zosyn DC'd   -Eliquis resumed        ESRD/hyponatremia/ACD               -Per renal              -Await renal recommendations regarding resuming Bumex since p.o. intake improving and vitals overall stable              -on PO iron      Chronic diastolic CHF/PAF/HTN              -Euvolemic on exam              -RC with cardiology following     DM1 with PN/ESRD              -Blood sugars decent though remain elevated              -Dietary advanced, other titrate basal insulin back to home level               Daily labs DC'd     Disposition -skilled importance of PT evaluation to patient as well as RN.  Pending their input, will consider discharge to home tomorrow.  If any type of skilled care is needed then ultimately will have to wait till Monday    Rickie Bruce MD  7/15/2023  11:07 EDT

## 2023-07-15 NOTE — PLAN OF CARE
Pt more A&O today. Eating good on RA most of the day, O2 when asleep. HD today. Eliquis started today. Pain is manageable, Tylenol for pain. Surgical sites clean, and dry, no evidence of infection or irritation.  Possible d/c tomorrow.

## 2023-07-15 NOTE — NURSING NOTE
hd without incident or c/o. tolerated well. removed no volume as per orders. retacrit as ordered. avf needles removed x 2. hemostasis achieved. stable, no c/o post completion of hd. seen per dr. kathleen during hd, no new orders.

## 2023-07-16 ENCOUNTER — APPOINTMENT (OUTPATIENT)
Dept: GENERAL RADIOLOGY | Facility: HOSPITAL | Age: 78
DRG: 417 | End: 2023-07-16
Payer: MEDICARE

## 2023-07-16 LAB
GLUCOSE BLDC GLUCOMTR-MCNC: 175 MG/DL (ref 70–130)
GLUCOSE BLDC GLUCOMTR-MCNC: 199 MG/DL (ref 70–130)
GLUCOSE BLDC GLUCOMTR-MCNC: 200 MG/DL (ref 70–130)
GLUCOSE BLDC GLUCOMTR-MCNC: 251 MG/DL (ref 70–130)

## 2023-07-16 PROCEDURE — 82948 REAGENT STRIP/BLOOD GLUCOSE: CPT

## 2023-07-16 PROCEDURE — 63710000001 INSULIN LISPRO (HUMAN) PER 5 UNITS: Performed by: SURGERY

## 2023-07-16 PROCEDURE — 74018 RADEX ABDOMEN 1 VIEW: CPT

## 2023-07-16 RX ADMIN — Medication 1 APPLICATION: at 08:30

## 2023-07-16 RX ADMIN — INSULIN LISPRO 2 UNITS: 100 INJECTION, SOLUTION INTRAVENOUS; SUBCUTANEOUS at 17:18

## 2023-07-16 RX ADMIN — MULTIPLE VITAMINS W/ MINERALS TAB 1 TABLET: TAB at 08:25

## 2023-07-16 RX ADMIN — APIXABAN 2.5 MG: 2.5 TABLET, FILM COATED ORAL at 20:30

## 2023-07-16 RX ADMIN — INSULIN GLARGINE-YFGN 12 UNITS: 100 INJECTION, SOLUTION SUBCUTANEOUS at 08:26

## 2023-07-16 RX ADMIN — FERROUS SULFATE TAB 325 MG (65 MG ELEMENTAL FE) 325 MG: 325 (65 FE) TAB at 08:25

## 2023-07-16 RX ADMIN — SODIUM BICARBONATE 650 MG: 650 TABLET ORAL at 08:25

## 2023-07-16 RX ADMIN — Medication 10 ML: at 20:30

## 2023-07-16 RX ADMIN — CETIRIZINE HYDROCHLORIDE 5 MG: 10 TABLET ORAL at 08:25

## 2023-07-16 RX ADMIN — SODIUM BICARBONATE 650 MG: 650 TABLET ORAL at 11:49

## 2023-07-16 RX ADMIN — HYDROCODONE BITARTRATE AND ACETAMINOPHEN 1 TABLET: 7.5; 325 TABLET ORAL at 22:51

## 2023-07-16 RX ADMIN — SODIUM BICARBONATE 650 MG: 650 TABLET ORAL at 17:18

## 2023-07-16 RX ADMIN — FAMOTIDINE 20 MG: 20 TABLET, FILM COATED ORAL at 17:18

## 2023-07-16 RX ADMIN — FAMOTIDINE 20 MG: 20 TABLET, FILM COATED ORAL at 08:25

## 2023-07-16 RX ADMIN — Medication 2000 UNITS: at 08:25

## 2023-07-16 RX ADMIN — INSULIN LISPRO 2 UNITS: 100 INJECTION, SOLUTION INTRAVENOUS; SUBCUTANEOUS at 08:26

## 2023-07-16 RX ADMIN — HYDROCODONE BITARTRATE AND ACETAMINOPHEN 1 TABLET: 7.5; 325 TABLET ORAL at 08:34

## 2023-07-16 RX ADMIN — SENNOSIDES AND DOCUSATE SODIUM 2 TABLET: 50; 8.6 TABLET ORAL at 08:25

## 2023-07-16 RX ADMIN — SODIUM BICARBONATE 650 MG: 650 TABLET ORAL at 20:30

## 2023-07-16 RX ADMIN — INSULIN LISPRO 2 UNITS: 100 INJECTION, SOLUTION INTRAVENOUS; SUBCUTANEOUS at 20:30

## 2023-07-16 RX ADMIN — Medication 10 ML: at 08:39

## 2023-07-16 RX ADMIN — INSULIN LISPRO 4 UNITS: 100 INJECTION, SOLUTION INTRAVENOUS; SUBCUTANEOUS at 11:49

## 2023-07-16 RX ADMIN — APIXABAN 2.5 MG: 2.5 TABLET, FILM COATED ORAL at 08:25

## 2023-07-16 NOTE — PLAN OF CARE
Goal Outcome Evaluation:      Patient A&Ox4. Turned q2. Barrier cream applied to bottom and a wound consult placed. Dressing changed on right foot. Up to BSC with assist x2. Very weak. 2 BM's overnight. Complaints of pain once and PRN tylenol given. VSS.

## 2023-07-16 NOTE — CASE MANAGEMENT/SOCIAL WORK
Continued Stay Note  [facility]     Patient Name: Kalyn Mejia  MRN: [id number]  Today's Date: 7/16/2023    Admit Date: 7/10/2023    Plan: Home with family to assist with OP HD vs SNF   Discharge Plan       Row Name 07/16/23 1037       Plan    Plan Home with family to assist with OP HD vs SNF    Patient/Family in Agreement with Plan unable to assess    Plan Comments spoke with staff RN who states pt's family has expressed concerns about pt going home at [place] vs going to SNF. Noted that pt did refuse PT on Friday. Pt will need to participate in PT to have a skillable need for SNF. Faxed a copy of [facility] SNF list to [facility] for staff RN to give to pt for her and family to review for SNF choices. Called to speak with pt's sister Harika about DC plans and left a generic VMM. CCP will continue to follow.......JW                   Discharge Codes    No documentation.                 Expected Discharge Date and Time       Expected Discharge Date Expected Discharge Time    Jul 17, 2023               Gertrude Del Toro, RN

## 2023-07-16 NOTE — PLAN OF CARE
Goal Outcome Evaluation:  Plan of Care Reviewed With: patient           Outcome Evaluation: Pt is A&Ox4, on RA this shift. Still awaiting PT to evaluate, message left with PT department. Pt lives in [place] and may need help being accepted into rehab centers in [place] if she qualifies. Q2 turns, eating just fine. HD patient.

## 2023-07-16 NOTE — PROGRESS NOTES
"    DAILY PROGRESS NOTE  [facility]    Patient Identification:  Name: Kalyn Mejia  Age: 78 y.o.  Sex: female  :  1945  MRN: [id number]         Primary Care Physician: Provider, No Known    Subjective:  Interval History: Reports abdominal pain but clinically looks better.  No nausea no vomiting and has had return of bowel function with multiple BMs.    Objective: Family x1 at bedside.  Case discussed with managing RN    Scheduled Meds:apixaban, 2.5 mg, Oral, Q12H  cadexomer iodine, 1 application , Topical, Daily  cetirizine, 5 mg, Oral, Daily  cholecalciferol, 2,000 Units, Oral, Daily  epoetin joceline/joceline-epbx, 5,000 Units, Intravenous, Once per day on Tue Thu Sat  famotidine, 20 mg, Oral, BID AC  ferrous sulfate, 325 mg, Oral, Every Other Day  insulin glargine, 12 Units, Subcutaneous, Daily  insulin lispro, 2-7 Units, Subcutaneous, 4x Daily AC & at Bedtime  multivitamin with minerals, 1 tablet, Oral, Daily  senna-docusate sodium, 2 tablet, Oral, BID  sodium bicarbonate, 650 mg, Oral, 4x Daily  sodium chloride, 10 mL, Intravenous, Q12H      Continuous Infusions:sodium chloride, 9 mL/hr, Last Rate: 9 mL/hr (23 1604)  sodium chloride, 30 mL/hr, Last Rate: 30 mL/hr (23 1659)        Vital signs in last 24 hours:  Temp:  [96.6 °F (35.9 °C)-98.4 °F (36.9 °C)] 98.2 °F (36.8 °C)  Heart Rate:  [67-73] 70  Resp:  [16-18] 16  BP: (130-156)/(56-71) 141/71    Intake/Output:    Intake/Output Summary (Last 24 hours) at 2023 0926  Last data filed at 2023 0500  Gross per 24 hour   Intake 120 ml   Output 0 ml   Net 120 ml       Exam:  /71 (BP Location: Left arm, Patient Position: Lying)   Pulse 70   Temp 98.2 °F (36.8 °C) (Oral)   Resp 16   Ht 157.5 cm (62\")   Wt 56 kg (123 lb 7.3 oz)   LMP  (LMP Unknown)   SpO2 94%   BMI 22.58 kg/m²     General Appearance:    Alert, cooperative, looks clinically better, does not appear in pain, alert and oriented x3                         " Throat:   Oral mucosa pink and moist                           Neck:   No JVD                         Lungs:    Clear to auscultation bilaterally, respirations unlabored                          Heart:    Regular rate and rhythm, S1 and S2 normal                  Abdomen:     Soft, CDI, +BS, I do not feel any changes on abdominal exam present today versus yesterday and certainly no rebound or guarding                 Extremities: Generalized weakness though moving all, no cyanosis or edema                        Pulses:   Pulses palpable in lower extremities                            Skin:   Skin is warm and dry, nonjaundiced                  Neurologic:   CNII-XII intact       Data Review:  Labs in chart were reviewed.    Assessment:  Active Hospital Problems    Diagnosis  POA    **Acute cholecystitis [K81.0]  Yes    Diastolic CHF, chronic [I50.32]  Yes    Calculus of gallbladder and bile duct with acute cholecystitis, with obstruction [K80.63]  Unknown    Anemia [D64.9]  Yes    COPD (chronic obstructive pulmonary disease) [J44.9]  Yes    DM (diabetes mellitus) [E11.9]  Yes    ESRD (end stage renal disease) [N18.6]  Yes    History of colon cancer [Z85.038]  Yes    HTN (hypertension) [I10]  Yes    PAF (paroxysmal atrial fibrillation) [I48.0]  Yes    Peripheral neuropathy [G62.9]  Yes      Resolved Hospital Problems   No resolved problems to display.       Plan:    PT was unavailable to evaluate patient 7/15/2023    Patient medically stable for discharge from my perspective -reports increased abdominal pain though I cannot see it on exam and otherwise patient has had improved return of gut function and bowel movements with no worsening nausea or vomiting so I am not so sure that her abdominal pain is indeed worse but pain is intangible and I will check a KUB to ensure no complicating postoperative features    Blood sugars improved with increasing Lantus    ESRD/renal    Rickie Marv Bruce MD  7/16/2023  09:26  EDT

## 2023-07-16 NOTE — DISCHARGE PLACEMENT REQUEST
"Kalyn Mccall (78 y.o. Female)       Date of Birth   1945    Social Security Number       Address   [address] [address]    Home Phone   [phone]    MRN   [id number]       Hinduism   None    Marital Status                               Admission Date   7/10/23    Admission Type   Emergency    Admitting Provider   James Kowalski MD    Attending Provider   Rickie Bruce MD    Department, Room/Bed   [address], S611/1       Discharge Date       Discharge Disposition       Discharge Destination                                 Attending Provider: Rickie Bruce MD    Allergies: Ativan [Lorazepam], Sulfa Antibiotics, Vancomycin    Isolation: None   Infection: None   Code Status: CPR    Ht: 157.5 cm (62\")   Wt: 56 kg (123 lb 7.3 oz)    Admission Cmt: None   Principal Problem: Acute cholecystitis [K81.0]                   Active Insurance as of 7/10/2023       Primary Coverage       Payor Plan Insurance Group Employer/Plan Group    MEDICARE MEDICARE A & B        Payor Plan Address Payor Plan Phone Number Payor Plan Fax Number Effective Dates    [address] [phone]  1/1/2010 - None Entered    [facility] [id number]         Subscriber Name Subscriber Birth Date Member ID       KALYN MCCALL 1945 [id number]               Secondary Coverage       Payor Plan Insurance Group Employer/Plan Group    [facility] SUPP [id number]       Payor Plan Address Payor Plan Phone Number Payor Plan Fax Number Effective Dates    [address]   4/1/2017 - None Entered    [facility] [id number]         Subscriber Name Subscriber Birth Date Member ID       KALYN MCCALL 1945 [id number]                     Emergency Contacts        (Rel.) Home Phone Work Phone Mobile Phone    Lopez,Harika (Sister) -- -- [phone]    Taisha Guzman (Sister) -- -- [phone]    More Polanco (Daughter) -- -- [phone]    " Dr. Sanket Lopez -- -- [phone]

## 2023-07-17 LAB
GLUCOSE BLDC GLUCOMTR-MCNC: 188 MG/DL (ref 70–130)
GLUCOSE BLDC GLUCOMTR-MCNC: 234 MG/DL (ref 70–130)
GLUCOSE BLDC GLUCOMTR-MCNC: 295 MG/DL (ref 70–130)
GLUCOSE BLDC GLUCOMTR-MCNC: 332 MG/DL (ref 70–130)
GLUCOSE BLDC GLUCOMTR-MCNC: 597 MG/DL (ref 70–130)

## 2023-07-17 PROCEDURE — 82948 REAGENT STRIP/BLOOD GLUCOSE: CPT

## 2023-07-17 PROCEDURE — 86704 HEP B CORE ANTIBODY TOTAL: CPT | Performed by: HOSPITALIST

## 2023-07-17 PROCEDURE — 25010000002 ONDANSETRON PER 1 MG: Performed by: SURGERY

## 2023-07-17 PROCEDURE — 97110 THERAPEUTIC EXERCISES: CPT

## 2023-07-17 PROCEDURE — 63710000001 INSULIN LISPRO (HUMAN) PER 5 UNITS: Performed by: SURGERY

## 2023-07-17 RX ORDER — HEPARIN SODIUM 1000 [USP'U]/ML
4000 INJECTION, SOLUTION INTRAVENOUS; SUBCUTANEOUS AS NEEDED
Status: CANCELLED | OUTPATIENT
Start: 2023-07-17

## 2023-07-17 RX ORDER — CARVEDILOL 3.12 MG/1
3.12 TABLET ORAL 2 TIMES DAILY WITH MEALS
Status: DISCONTINUED | OUTPATIENT
Start: 2023-07-17 | End: 2023-07-18 | Stop reason: HOSPADM

## 2023-07-17 RX ORDER — BUMETANIDE 1 MG/1
1 TABLET ORAL 2 TIMES DAILY
Status: DISCONTINUED | OUTPATIENT
Start: 2023-07-17 | End: 2023-07-18 | Stop reason: HOSPADM

## 2023-07-17 RX ADMIN — SENNOSIDES AND DOCUSATE SODIUM 2 TABLET: 50; 8.6 TABLET ORAL at 09:04

## 2023-07-17 RX ADMIN — SODIUM BICARBONATE 650 MG: 650 TABLET ORAL at 20:15

## 2023-07-17 RX ADMIN — INSULIN LISPRO 3 UNITS: 100 INJECTION, SOLUTION INTRAVENOUS; SUBCUTANEOUS at 17:54

## 2023-07-17 RX ADMIN — FAMOTIDINE 20 MG: 20 TABLET, FILM COATED ORAL at 17:53

## 2023-07-17 RX ADMIN — APIXABAN 2.5 MG: 2.5 TABLET, FILM COATED ORAL at 20:15

## 2023-07-17 RX ADMIN — CARVEDILOL 3.12 MG: 3.12 TABLET, FILM COATED ORAL at 17:53

## 2023-07-17 RX ADMIN — SODIUM BICARBONATE 650 MG: 650 TABLET ORAL at 13:36

## 2023-07-17 RX ADMIN — APIXABAN 2.5 MG: 2.5 TABLET, FILM COATED ORAL at 09:04

## 2023-07-17 RX ADMIN — HYDROCODONE BITARTRATE AND ACETAMINOPHEN 1 TABLET: 7.5; 325 TABLET ORAL at 20:15

## 2023-07-17 RX ADMIN — INSULIN LISPRO 2 UNITS: 100 INJECTION, SOLUTION INTRAVENOUS; SUBCUTANEOUS at 22:27

## 2023-07-17 RX ADMIN — HYDROCODONE BITARTRATE AND ACETAMINOPHEN 1 TABLET: 7.5; 325 TABLET ORAL at 06:23

## 2023-07-17 RX ADMIN — SODIUM BICARBONATE 650 MG: 650 TABLET ORAL at 17:53

## 2023-07-17 RX ADMIN — INSULIN LISPRO 4 UNITS: 100 INJECTION, SOLUTION INTRAVENOUS; SUBCUTANEOUS at 13:36

## 2023-07-17 RX ADMIN — CETIRIZINE HYDROCHLORIDE 5 MG: 10 TABLET ORAL at 09:04

## 2023-07-17 RX ADMIN — Medication 1 APPLICATION: at 09:05

## 2023-07-17 RX ADMIN — INSULIN GLARGINE-YFGN 12 UNITS: 100 INJECTION, SOLUTION SUBCUTANEOUS at 09:03

## 2023-07-17 RX ADMIN — FAMOTIDINE 20 MG: 20 TABLET, FILM COATED ORAL at 06:23

## 2023-07-17 RX ADMIN — BUMETANIDE 1 MG: 1 TABLET ORAL at 20:15

## 2023-07-17 RX ADMIN — SENNOSIDES AND DOCUSATE SODIUM 2 TABLET: 50; 8.6 TABLET ORAL at 20:15

## 2023-07-17 RX ADMIN — Medication 10 ML: at 20:15

## 2023-07-17 RX ADMIN — MULTIPLE VITAMINS W/ MINERALS TAB 1 TABLET: TAB at 09:04

## 2023-07-17 RX ADMIN — Medication 2000 UNITS: at 09:05

## 2023-07-17 RX ADMIN — Medication 10 ML: at 09:05

## 2023-07-17 RX ADMIN — ONDANSETRON 4 MG: 2 INJECTION INTRAMUSCULAR; INTRAVENOUS at 06:34

## 2023-07-17 RX ADMIN — SODIUM BICARBONATE 650 MG: 650 TABLET ORAL at 09:04

## 2023-07-17 NOTE — THERAPY TREATMENT NOTE
Patient Name: Kalyn Mejia  : 1945    MRN: [id number]                              Today's Date: 2023       Admit Date: 7/10/2023    Visit Dx:     ICD-10-CM ICD-9-CM   1. Abdominal pain, acute, right upper quadrant  R10.11 789.01     338.19   2. Calculus of gallbladder and bile duct with acute cholecystitis, with obstruction  K80.63 574.61   3. ESRD on dialysis  N18.6 585.6    Z99.2 V45.11   4. Acute cholecystitis  K81.0 575.0     Patient Active Problem List   Diagnosis    HTN (hypertension)    ESRD (end stage renal disease)    Peripheral neuropathy    DM (diabetes mellitus)    Anemia    COPD (chronic obstructive pulmonary disease)    PAF (paroxysmal atrial fibrillation)    History of colon cancer    Penetrating foot wound    Acute cholecystitis    Diastolic CHF, chronic    Calculus of gallbladder and bile duct with acute cholecystitis, with obstruction     Past Medical History:   Diagnosis Date    Cancer     CHF (congestive heart failure)     Diabetes mellitus     DVT (deep venous thrombosis)     Gastroparesis     GERD (gastroesophageal reflux disease)     Hyperlipidemia     Hypertension     Kidney transplant failure     Neuropathy     Osteoporosis     Pulmonary nodules     Renal failure     Rheumatoid arthritis     Spinal stenosis     Stroke      Past Surgical History:   Procedure Laterality Date    CHOLECYSTECTOMY N/A 2023    Procedure: CHOLECYSTECTOMY LAPAROSCOPIC WITH DAVINCI ROBOT with cholangiogram;  Surgeon: Rolando Ivey MD;  Location: [facility] OR;  Service: Robotics - DaVinci;  Laterality: N/A;    ERCP N/A 2023    Procedure: ENDOSCOPIC RETROGRADE CHOLANGIOPANCREATOGRAPHY WITH SPHINCTEROTOMY AND BALLOON STONE SWEEP;  Surgeon: Pro Hooker MD;  Location: [facility] ENDOSCOPY;  Service: Gastroenterology;  Laterality: N/A;  COMMON BILE DUCT STONE    INCISION AND DRAINAGE LEG Left 2023    Procedure: LEFT INCISION AND DRAINAGE FOOT;  Surgeon: Justice Rosario MD;   Location: [facility] MAIN OR;  Service: Vascular;  Laterality: Left;      General Information       [facility] Name 07/17/23 1629          Physical Therapy Time and Intention    Document Type therapy note (daily note)  -     Mode of Treatment individual therapy;physical therapy  -[facility] Name 07/17/23 1629          General Information    Patient Profile Reviewed yes  -     Existing Precautions/Restrictions fall  -[facility] Name 07/17/23 1629          Living Environment    People in Home child(david), adult  dtr is currently out of the country in [place]  -[facility] Name 07/17/23 1629          Cognition    Orientation Status (Cognition) oriented x 4  -[facility] Name 07/17/23 1629          Safety Issues, Functional Mobility    Safety Issues Affecting Function (Mobility) problem-solving  -     Impairments Affecting Function (Mobility) balance;endurance/activity tolerance;strength  -               User Key  (r) = Recorded By, (t) = Taken By, (c) = Cosigned By      Initials Name Provider Type     Stefanie Deng PTA Physical Therapist Assistant                   Mobility       Row Name 07/17/23 1630          Bed Mobility    Supine-Sit [place] (Bed Mobility) moderate assist (50% patient effort)  -     Sit-Supine [place] (Bed Mobility) not tested  -     Assistive Device (Bed Mobility) head of bed elevated  -[facility] Name 07/17/23 1630          Bed-Chair Transfer    Bed-Chair [place] (Transfers) 2 person assist;moderate assist (50% patient effort)  -     Assistive Device (Bed-Chair Transfers) --  HHA-2  -[facility] Name 07/17/23 1630          Sit-Stand Transfer    Sit-Stand [place] (Transfers) 2 person assist;moderate assist (50% patient effort)  -     Assistive Device (Sit-Stand Transfers) walker, front-wheeled  -     Comment, (Sit-Stand Transfer) blocked knees , perf STS x2, 2nd attempt w/o AD and perf pivot-tfer into recliner  -       Row Name 07/17/23 1630           Gait/Stairs (Locomotion)    [place] Level (Gait) unable to assess  -     Comment, (Gait/Stairs) pt states she has not ambulated in a while  -               User Key  (r) = Recorded By, (t) = Taken By, (c) = Cosigned By      Initials Name Provider Type    Stefanie Zelaya PTA Physical Therapist Assistant                   Obj/Interventions    No documentation.                  Goals/Plan    No documentation.                  Clinical Impression       Row Name 07/17/23 1636          Pain    Pre/Posttreatment Pain Comment some pain in L foot, but no number given  -     Pain Intervention(s) Repositioned;Elevated  -       Row Name 07/17/23 1636          Plan of Care Review    Plan of Care Reviewed With patient  -     Progress improving  -     Outcome Evaluation Pt agreed to PT session, reports wkness in LEs and hx of knee buckling; pt tolerated supine to sit w/MOD assist, STS x2 w/MOD 2, pivot to chair with MOD 2; pt educ offered on safety and also blocked knees to avoid buckling; pt too fatigued to add exer to session , but plan to add to next visit; plans SNU [place] that will provide HD  -[facility] Name 07/17/23 1636          Therapy Assessment/Plan (PT)    Rehab Potential (PT) fair, will monitor progress closely  -     Criteria for Skilled Interventions Met (PT) yes  -       Row Name 07/17/23 1636          Vital Signs    O2 Delivery Pre Treatment room air  -[facility] Name 07/17/23 1636          Positioning and Restraints    Pre-Treatment Position in bed  -     Post Treatment Position chair  -     In Chair reclined;call light within reach;encouraged to call for assist;exit alarm on;notified nsg  -               User Key  (r) = Recorded By, (t) = Taken By, (c) = Cosigned By      Initials Name Provider Type    Stefanie Zelaya PTA Physical Therapist Assistant                   Outcome Measures       Row Name 07/17/23 1642          How much help from another person do you currently  need...    Turning from your back to your side while in flat bed without using bedrails? 3  -JM     Moving from lying on back to sitting on the side of a flat bed without bedrails? 2  -JM     Moving to and from a bed to a chair (including a wheelchair)? 2  -JM     Standing up from a chair using your arms (e.g., wheelchair, bedside chair)? 2  -JM     Climbing 3-5 steps with a railing? 1  -JM     To walk in hospital room? 1  -JM     AM-PAC 6 Clicks Score (PT) 11  -JM     Highest level of mobility 4 --> Transferred to chair/commode  -               User Key  (r) = Recorded By, (t) = Taken By, (c) = Cosigned By      Initials Name Provider Type    Stefanie Zelaya PTA Physical Therapist Assistant                                 Physical Therapy Education       Title: PT OT SLP Therapies (Done)       Topic: Physical Therapy (Done)       Point: Mobility training (Done)       Learning Progress Summary             Patient Acceptance, E,TB,D, VU by  at 7/17/2023 1642    Acceptance, E, NR by AR at 7/13/2023 1557                         Point: Home exercise program (Done)       Learning Progress Summary             Patient Acceptance, E,TB,D, VU by  at 7/17/2023 1642    Acceptance, E, NR by AR at 7/13/2023 1557                         Point: Body mechanics (Done)       Learning Progress Summary             Patient Acceptance, E,TB,D, VU by  at 7/17/2023 1642    Acceptance, E, NR by AR at 7/13/2023 1557                         Point: Precautions (Done)       Learning Progress Summary             Patient Acceptance, E,TB,D, VU by  at 7/17/2023 1642    Acceptance, E, NR by AR at 7/13/2023 1557                                         User Key       Initials Effective Dates Name Provider Type Discipline     03/07/18 -  Stefanie Deng PTA Physical Therapist Assistant PT    AR 06/16/21 -  Malu Mittal PT Physical Therapist PT                  PT Recommendation and Plan     Plan of Care Reviewed With:  patient  Progress: improving  Outcome Evaluation: Pt agreed to PT session, reports wkness in LEs and hx of knee buckling; pt tolerated supine to sit w/MOD assist, STS x2 w/MOD 2, pivot to chair with MOD 2; pt educ offered on safety and also blocked knees to avoid buckling; pt too fatigued to add exer to session , but plan to add to next visit; plans SNU at [place] that will provide HD     Time Calculation:    PT Charges       Row Name 07/17/23 1643             Time Calculation    Start Time 1530  -      Stop Time 1607  -      Time Calculation (min) 37 min  -SHAD      PT Received On 07/17/23  -SHAD      PT - Next Appointment 07/18/23  -SHAD                User Key  (r) = Recorded By, (t) = Taken By, (c) = Cosigned By      Initials Name Provider Type    Stefanie Zelaya PTA Physical Therapist Assistant                  Therapy Charges for Today       Code Description Service Date Service Provider Modifiers Qty    99181896849 HC PT THER PROC EA 15 MIN 7/17/2023 Stefanie Deng PTA GP 2    21392617693 HC PT THER SUPP EA 15 MIN 7/17/2023 Stefanie Deng PTA GP 2            PT G-Codes  AM-PAC 6 Clicks Score (PT): 11  PT Discharge Summary  Anticipated Discharge Disposition (PT): skilled nursing facility (dtr lives in [place])    Stefanie Deng PTA  7/17/2023

## 2023-07-17 NOTE — PLAN OF CARE
Goal Outcome Evaluation:  Plan of Care Reviewed With: patient      Progress: no change  Outcome Evaluation: All needs met. Turns as tolerated. Cream applied to bottom. Dressing changed to L posterior foot. VSS. Oriented x 4. RA while awake and 1L NC for sleep. SR on the monitor. Norco PRN. Plans for HD today.

## 2023-07-17 NOTE — CASE MANAGEMENT/SOCIAL WORK
Continued Stay Note  [facility]     Patient Name: Kalyn Mejia  MRN: [id number]  Today's Date: 7/17/2023    Admit Date: 7/10/2023    Plan: SNF - referrals pending   Discharge Plan       Row Name 07/17/23 0346       Plan    Plan SNF - referrals pending    Patient/Family in Agreement with Plan yes    Plan Comments Spoke with patient and sister Taisha at bedside.  Patient still hoping to return home ([place], [place]) and have rehab nearer home.  Family are making arrangements to transport her.  Call to [facility] and awaiting return call, [facility] and awaiting return call, [place] [facility] - per [facility], they do not do HD,  [place] - they do not do HD.  Call to [facility] - spoke with [facility] and they do not do HD, [facility] ([place]) they do not do HD.  Left message for [facility] with request for return call.  Told patient that may need to stay in [place] for a few days for rehab.  No beds at [facility], [facility] or [facility].  Referral to [facility] bed should be available.  CCP continues to follow.  Merry SLADE                 Expected Discharge Date and Time       Expected Discharge Date Expected Discharge Time    Jul 18, 2023               Becky S. [place], RN

## 2023-07-17 NOTE — NURSING NOTE
CWOCN- consult for bilateral buttocks/coccyx. Reviewed chart and assessed patient. Patient with 3 small areas of DTI noted at buttocks and coccyx. Buttocks both about 2.5x2.5cm and coccyx 1.2x0.5cm. Patient reports that it started hurting after being in the OR. The skin appears improved from the photo- there is less purple to the skin, it is more red/maroon. Patient says it is tender but the bubble cushion helps- staff is using the bubble cushion for turning. Patient is trying to stay off her buttocks. Barrier ointment has been applied. I think she may get more relief with a silicone border dressing so I placed one to decrease friction/shear/pressure. Explained use of the dressing and cushion and advised that she should use it when she travels home. She verbalized understanding.   CWOCN will follow up later in the week if she remains inpatient.        07/17/23 1524   Wound 07/15/23 2028 coccyx   Placement Date/Time: 07/15/23 2028   Location: coccyx   Pressure Injury Stage DTPI   Dressing Appearance dressing loose   Base maroon/purple   Periwound intact;blanchable;pink   Periwound Temperature warm   Periwound Skin Turgor soft   Wound Length (cm) 2.5 cm   Wound Width (cm) 2.5 cm   Wound Surface Area (cm^2) 6.25 cm^2   Drainage Amount none   Care, Wound cleansed with  (green wipes)   Dressing Care dressing applied;border dressing;silicone   Periwound Care dry periwound area maintained

## 2023-07-17 NOTE — PROGRESS NOTES
"    DAILY PROGRESS NOTE  [facility]    Patient Identification:  Name: Kalyn Mejia  Age: 78 y.o.  Sex: female  :  1945  MRN: [id number]         Primary Care Physician: Provider, No Known    Subjective:  Interval History: Tolerating p.o.  No nausea no vomiting.  No complaints of abdominal pain and her KUB was completely unremarkable.  No new chest pain or troubles breathing    Objective: Conversational pleasant and mentally at baseline.  No family currently at bedside    Scheduled Meds:apixaban, 2.5 mg, Oral, Q12H  cadexomer iodine, 1 application , Topical, Daily  cetirizine, 5 mg, Oral, Daily  cholecalciferol, 2,000 Units, Oral, Daily  epoetin joceline/joceline-epbx, 5,000 Units, Intravenous, Once per day on Tue Thu Sat  famotidine, 20 mg, Oral, BID AC  ferrous sulfate, 325 mg, Oral, Every Other Day  insulin glargine, 12 Units, Subcutaneous, Daily  insulin lispro, 2-7 Units, Subcutaneous, 4x Daily AC & at Bedtime  multivitamin with minerals, 1 tablet, Oral, Daily  senna-docusate sodium, 2 tablet, Oral, BID  sodium bicarbonate, 650 mg, Oral, 4x Daily  sodium chloride, 10 mL, Intravenous, Q12H      Continuous Infusions:sodium chloride, 9 mL/hr, Last Rate: 9 mL/hr (23 1604)  sodium chloride, 30 mL/hr, Last Rate: 30 mL/hr (23 1659)        Vital signs in last 24 hours:  Temp:  [97.4 °F (36.3 °C)-98.4 °F (36.9 °C)] 97.4 °F (36.3 °C)  Heart Rate:  [69-73] 71  Resp:  [16-18] 18  BP: (135-175)/(59-69) 175/69    Intake/Output:    Intake/Output Summary (Last 24 hours) at 2023 1119  Last data filed at 2023 0416  Gross per 24 hour   Intake 480 ml   Output --   Net 480 ml       Exam:  /69 (BP Location: Left arm, Patient Position: Lying)   Pulse 71   Temp 97.4 °F (36.3 °C) (Oral)   Resp 18   Ht 157.5 cm (62\")   Wt 53.8 kg (118 lb 11.2 oz)   LMP  (LMP Unknown)   SpO2 94%   BMI 21.71 kg/m²     General Appearance:    Alert, cooperative, no distress, AAOx3                         " Throat:   Oral mucosa pink and moist                           Neck:   No JVD                         Lungs:    Decreased bases otherwise clear to auscultation bilaterally, respirations unlabored                          Heart:    Regular rate and rhythm, S1 and S2 normal                  Abdomen:     Soft, nontender, bowel sounds active, lap CDI                 Extremities: Generalized weakness, no cyanosis or edema                        Pulses:   Pulses palpable in lower extremities                               Data Review:  Labs in chart were reviewed.    Assessment:  Active Hospital Problems    Diagnosis  POA    **Acute cholecystitis [K81.0]  Yes    Diastolic CHF, chronic [I50.32]  Yes    Calculus of gallbladder and bile duct with acute cholecystitis, with obstruction [K80.63]  Unknown    Anemia [D64.9]  Yes    COPD (chronic obstructive pulmonary disease) [J44.9]  Yes    DM (diabetes mellitus) [E11.9]  Yes    ESRD (end stage renal disease) [N18.6]  Yes    History of colon cancer [Z85.038]  Yes    HTN (hypertension) [I10]  Yes    PAF (paroxysmal atrial fibrillation) [I48.0]  Yes    Peripheral neuropathy [G62.9]  Yes      Resolved Hospital Problems   No resolved problems to display.       Plan:    Medically ready at any time    Case discussed at length with CCP -disposition issues as patient requesting SNF in [place] and CCP well aware of needs and will have to work with what ever restraints we have with location availability as well as what insurance will cover    ESRD per HD    Rickie Bruce MD  7/17/2023  11:19 EDT

## 2023-07-17 NOTE — PLAN OF CARE
Goal Outcome Evaluation:  Plan of Care Reviewed With: patient        Progress: improving  Outcome Evaluation: Pt agreed to PT session, reports wkness in LEs and hx of knee buckling; pt tolerated supine to sit w/MOD assist, STS x2 w/MOD 2, pivot to chair with MOD 2; pt educ offered on safety and also blocked knees to avoid buckling; pt too fatigued to add exer to session , but plan to add to next visit; plans SNU at [place] that will provide HD      Anticipated Discharge Disposition (PT): skilled nursing facility (dtr lives in [place])

## 2023-07-17 NOTE — PROGRESS NOTES
"   LOS: 6 days     Chief Complaint/ Reason for encounter: ESRD, dialysis management    Subjective   07/12/23 : Patient is doing well today with no new complaints  N.p.o. for surgery  No shortness of breath chest pain or edema  Voiding well with no dysuria  Admitted yesterday with abdominal pain nausea and vomiting and acute cholecystitis    7/13: Complaining of some postop pain, status post lap katelyn and now needs ERCP    7/14: She had dialysis yesterday morning, tolerated well then ERCP with findings of multiple biliary stones status post sphincterotomy and stone extraction    7/15: Seen on dialysis, tolerating treatment well.  She looks and feels much better and states her appetite is returning  No UF today, 3K bath.  Blood flow rate 350, dialysate flow rate 500.  Arterial and venous pressure around 140.  Discussed orders with dialysis RN, Uma Wheeler    7/16 doing well, sitting up in bed putting on make up     Medical history reviewed:  History of Present Illness    Subjective    History taken from: Patient and chart    Vital Signs  Temp:  [98.2 °F (36.8 °C)-98.4 °F (36.9 °C)] 98.2 °F (36.8 °C)  Heart Rate:  [69-73] 73  Resp:  [16] 16  BP: (135-154)/(56-71) 154/68       Wt Readings from Last 1 Encounters:   07/16/23 0500 56 kg (123 lb 7.3 oz)   07/15/23 0304 56.3 kg (124 lb 1.9 oz)   07/14/23 0605 57.5 kg (126 lb 12.2 oz)   07/13/23 1655 56.2 kg (124 lb)   07/13/23 1100 54.9 kg (121 lb 0.5 oz)   07/13/23 0500 56.3 kg (124 lb 1.9 oz)   07/12/23 0753 59.9 kg (132 lb)   07/12/23 0500 60 kg (132 lb 4.4 oz)   07/10/23 1007 55.3 kg (122 lb)       Objective:  Vital signs: (most recent): Blood pressure 154/68, pulse 73, temperature 98.2 °F (36.8 °C), temperature source Oral, resp. rate 16, height 157.5 cm (62\"), weight 56 kg (123 lb 7.3 oz), SpO2 94 %.              Objective:  General Appearance:  Comfortable, well-appearing, in no acute distress and not in pain.  Awake, alert, oriented  HEENT: Mucous membranes moist, no " injury, oropharynx clear  Lungs:  Normal effort and normal respiratory rate.  Breath sounds clear to auscultation.  No  respiratory distress.  No rales, decreased breath sounds or rhonchi.    Heart: Normal rate.  Regular rhythm.  S1, S2 normal.  No murmur.   Abdomen: Abdomen is soft.  Bowel sounds are normal, no abdominal tenderness.  There is no rebound or guarding  Extremities: No edema of bilateral lower extremities  Neurological: No focal motor or sensory deficits, pupils reactive  Skin:  Warm and dry.  No rash or cyanosis.   Right lower arm avf  +thrill       Results Review:    Intake/Output:     Intake/Output Summary (Last 24 hours) at 7/16/2023 2219  Last data filed at 7/16/2023 1920  Gross per 24 hour   Intake 540 ml   Output --   Net 540 ml           DATA:  Radiology and Labs:  The following labs independently reviewed by me. Additional labs ordered for tomorrow a.m.  Interval notes, chart personally reviewed by me.   Discussed with patient/family    Risk/ complexity of medical care/ medical decision making moderate risk, postop lap katelyn    Labs:   Recent Results (from the past 24 hour(s))   POC Glucose Once    Collection Time: 07/16/23  6:29 AM    Specimen: Blood   Result Value Ref Range    Glucose 175 (H) 70 - 130 mg/dL   POC Glucose Once    Collection Time: 07/16/23 11:19 AM    Specimen: Blood   Result Value Ref Range    Glucose 251 (H) 70 - 130 mg/dL   POC Glucose Once    Collection Time: 07/16/23  4:32 PM    Specimen: Blood   Result Value Ref Range    Glucose 200 (H) 70 - 130 mg/dL   POC Glucose Once    Collection Time: 07/16/23  7:27 PM    Specimen: Blood   Result Value Ref Range    Glucose 199 (H) 70 - 130 mg/dL       Radiology:  Pertinent radiology studies were reviewed as described above      Medications have been reviewed separately in chart overview      ASSESSMENT:  ESRD, will keep on a TTS dialysis schedule this admission due to surgery today  Cholecystitis, cholecystectomy planned for  today  Anemia of CKD  Hyperphosphatemia   HTN  Elevated transaminase levels due to acute cholecystitis  Metabolic acidosis  Choledocholithiasis status post ERCP/sphincterotomy/stone extraction      Plan:  Next HD Tuesday   Continue on tts   Continue sodium bicarb supplements   Holding BP meds and diuretics for now   Continue vitamin D    IV antibiotics, renally dosed for GFR less than 15  Follow-up a.m. labs      Carrol Carroll MD   [facility]   Office phone number: [phone]  Answering service phone number: [phone]    07/16/23  22:19 EDT

## 2023-07-17 NOTE — PROGRESS NOTES
"   LOS: 7 days     Chief Complaint/ Reason for encounter: ESRD, dialysis management    Subjective   07/12/23 : Patient is doing well today with no new complaints  N.p.o. for surgery  No shortness of breath chest pain or edema  Voiding well with no dysuria  Admitted yesterday with abdominal pain nausea and vomiting and acute cholecystitis    7/13: Complaining of some postop pain, status post lap katelyn and now needs ERCP    7/14: She had dialysis yesterday morning, tolerated well then ERCP with findings of multiple biliary stones status post sphincterotomy and stone extraction    7/15: Seen on dialysis, tolerating treatment well.  She looks and feels much better and states her appetite is returning  No UF today, 3K bath.  Blood flow rate 350, dialysate flow rate 500.  Arterial and venous pressure around 140.  Discussed orders with dialysis RN, Uma Wheeler    7/16 doing well, sitting up in bed putting on make up     7/17: She looks and feels better denies complaints  Eating and drinking better.  Looking into rehab in [place] at discharge    Medical history reviewed:  History of Present Illness    Subjective    History taken from: Patient and chart    Vital Signs  Temp:  [97.4 °F (36.3 °C)-98.4 °F (36.9 °C)] 97.4 °F (36.3 °C)  Heart Rate:  [69-73] 71  Resp:  [16-18] 18  BP: (135-175)/(59-69) 175/69       Wt Readings from Last 1 Encounters:   07/17/23 0636 53.8 kg (118 lb 11.2 oz)   07/16/23 0500 56 kg (123 lb 7.3 oz)   07/15/23 0304 56.3 kg (124 lb 1.9 oz)   07/14/23 0605 57.5 kg (126 lb 12.2 oz)   07/13/23 1655 56.2 kg (124 lb)   07/13/23 1100 54.9 kg (121 lb 0.5 oz)   07/13/23 0500 56.3 kg (124 lb 1.9 oz)   07/12/23 0753 59.9 kg (132 lb)   07/12/23 0500 60 kg (132 lb 4.4 oz)   07/10/23 1007 55.3 kg (122 lb)       Objective:  Vital signs: (most recent): Blood pressure 175/69, pulse 71, temperature 97.4 °F (36.3 °C), temperature source Oral, resp. rate 18, height 157.5 cm (62\"), weight 53.8 kg (118 lb 11.2 oz), SpO2 " 94 %.              Objective:  General Appearance:  Comfortable, well-appearing, in no acute distress and not in pain.  Awake, alert, oriented  HEENT: Mucous membranes moist, no injury, oropharynx clear  Lungs:  Normal effort and normal respiratory rate.  Breath sounds clear to auscultation.  No  respiratory distress.  No rales, decreased breath sounds or rhonchi.    Heart: Normal rate.  Regular rhythm.  S1, S2 normal.  No murmur.   Abdomen: Abdomen is soft.  Bowel sounds are normal, no abdominal tenderness.  There is no rebound or guarding  Extremities: No edema of bilateral lower extremities  Neurological: No focal motor or sensory deficits, pupils reactive  Skin:  Warm and dry.  No rash or cyanosis.   Right lower arm avf  +thrill       Results Review:    Intake/Output:     Intake/Output Summary (Last 24 hours) at 7/17/2023 1252  Last data filed at 7/17/2023 0416  Gross per 24 hour   Intake 480 ml   Output --   Net 480 ml           DATA:  Radiology and Labs:  The following labs independently reviewed by me. Additional labs ordered for tomorrow a.m.  Interval notes, chart personally reviewed by me.   Discussed with patient/family    Risk/ complexity of medical care/ medical decision making moderate risk, postop lap katelyn    Labs:   Recent Results (from the past 24 hour(s))   POC Glucose Once    Collection Time: 07/16/23  4:32 PM    Specimen: Blood   Result Value Ref Range    Glucose 200 (H) 70 - 130 mg/dL   POC Glucose Once    Collection Time: 07/16/23  7:27 PM    Specimen: Blood   Result Value Ref Range    Glucose 199 (H) 70 - 130 mg/dL   POC Glucose Once    Collection Time: 07/17/23  6:32 AM    Specimen: Blood   Result Value Ref Range    Glucose 188 (H) 70 - 130 mg/dL   POC Glucose Once    Collection Time: 07/17/23 11:21 AM    Specimen: Blood   Result Value Ref Range    Glucose 332 (H) 70 - 130 mg/dL       Radiology:  Pertinent radiology studies were reviewed as described above      Medications have been reviewed  separately in chart overview      ASSESSMENT:  ESRD, will keep on a TTS dialysis schedule this admission.  Volume electrolytes stable  Cholecystitis, status post lap katelyn  Anemia of CKD  Hyperphosphatemia   HTN  Elevated transaminase levels due to acute cholecystitis, improved  Metabolic acidosis, stable  Choledocholithiasis status post ERCP/sphincterotomy/stone extraction      Plan:  Plan dialysis tomorrow and keep on Tuesday Thursday Saturday schedule for now  Volume and electrolytes have been at goal  Recheck chemistries in a.m.  Continue sodium bicarb supplements   Epogen with HD for anemia  Discussed with Dr. Bruce.  Possible discharge to rehab tomorrow so will request a.m. dialysis  Discussed with RN, Uma Wheeler with [facility]  Resume BP meds and oral diuretics  Continue vitamin D    Follow-up a.m. labs      Marv Carroll MD   [facility]   Office phone number: [phone]  Answering service phone number: [phone]    07/17/23  12:52 EDT

## 2023-07-18 VITALS
WEIGHT: 126.54 LBS | BODY MASS INDEX: 23.29 KG/M2 | HEIGHT: 62 IN | HEART RATE: 70 BPM | RESPIRATION RATE: 16 BRPM | SYSTOLIC BLOOD PRESSURE: 149 MMHG | DIASTOLIC BLOOD PRESSURE: 70 MMHG | TEMPERATURE: 96.9 F | OXYGEN SATURATION: 99 %

## 2023-07-18 PROBLEM — I50.21 ACUTE HFREF (HEART FAILURE WITH REDUCED EJECTION FRACTION): Status: ACTIVE | Noted: 2023-07-18

## 2023-07-18 LAB
ALBUMIN SERPL-MCNC: 2.7 G/DL (ref 3.5–5.2)
ALBUMIN/GLOB SERPL: 1 G/DL
ALP SERPL-CCNC: 796 U/L (ref 39–117)
ALT SERPL W P-5'-P-CCNC: 44 U/L (ref 1–33)
ANION GAP SERPL CALCULATED.3IONS-SCNC: 10 MMOL/L (ref 5–15)
AST SERPL-CCNC: 15 U/L (ref 1–32)
BILIRUB SERPL-MCNC: 1 MG/DL (ref 0–1.2)
BUN SERPL-MCNC: 36 MG/DL (ref 8–23)
BUN/CREAT SERPL: 7.4 (ref 7–25)
CALCIUM SPEC-SCNC: 10.1 MG/DL (ref 8.6–10.5)
CHLORIDE SERPL-SCNC: 98 MMOL/L (ref 98–107)
CO2 SERPL-SCNC: 25 MMOL/L (ref 22–29)
CREAT SERPL-MCNC: 4.88 MG/DL (ref 0.57–1)
EGFRCR SERPLBLD CKD-EPI 2021: 8.6 ML/MIN/1.73
GLOBULIN UR ELPH-MCNC: 2.7 GM/DL
GLUCOSE BLDC GLUCOMTR-MCNC: 103 MG/DL (ref 70–130)
GLUCOSE BLDC GLUCOMTR-MCNC: 147 MG/DL (ref 70–130)
GLUCOSE BLDC GLUCOMTR-MCNC: 94 MG/DL (ref 70–130)
GLUCOSE SERPL-MCNC: 126 MG/DL (ref 65–99)
HBV CORE AB SERPL QL IA: NEGATIVE
POTASSIUM SERPL-SCNC: 5 MMOL/L (ref 3.5–5.2)
PROT SERPL-MCNC: 5.4 G/DL (ref 6–8.5)
SODIUM SERPL-SCNC: 133 MMOL/L (ref 136–145)

## 2023-07-18 PROCEDURE — 82948 REAGENT STRIP/BLOOD GLUCOSE: CPT

## 2023-07-18 PROCEDURE — 80053 COMPREHEN METABOLIC PANEL: CPT | Performed by: INTERNAL MEDICINE

## 2023-07-18 RX ORDER — HYDROCODONE BITARTRATE AND ACETAMINOPHEN 7.5; 325 MG/1; MG/1
1 TABLET ORAL EVERY 8 HOURS PRN
Qty: 10 TABLET | Refills: 0 | Status: SHIPPED | OUTPATIENT
Start: 2023-07-18 | End: 2023-07-18 | Stop reason: SDUPTHER

## 2023-07-18 RX ORDER — FAMOTIDINE 20 MG/1
20 TABLET, FILM COATED ORAL
Start: 2023-07-18

## 2023-07-18 RX ORDER — SODIUM BICARBONATE 650 MG/1
650 TABLET ORAL 2 TIMES DAILY
Status: DISCONTINUED | OUTPATIENT
Start: 2023-07-18 | End: 2023-07-18 | Stop reason: HOSPADM

## 2023-07-18 RX ORDER — HYDROCODONE BITARTRATE AND ACETAMINOPHEN 7.5; 325 MG/1; MG/1
1 TABLET ORAL EVERY 8 HOURS PRN
Qty: 10 TABLET | Refills: 0 | Status: SHIPPED | OUTPATIENT
Start: 2023-07-18 | End: 2023-07-23

## 2023-07-18 RX ORDER — INSULIN LISPRO 100 [IU]/ML
2-7 INJECTION, SOLUTION INTRAVENOUS; SUBCUTANEOUS
Refills: 12
Start: 2023-07-18

## 2023-07-18 RX ADMIN — Medication 2000 UNITS: at 08:29

## 2023-07-18 RX ADMIN — HYDROCODONE BITARTRATE AND ACETAMINOPHEN 1 TABLET: 7.5; 325 TABLET ORAL at 17:16

## 2023-07-18 RX ADMIN — FERROUS SULFATE TAB 325 MG (65 MG ELEMENTAL FE) 325 MG: 325 (65 FE) TAB at 08:29

## 2023-07-18 RX ADMIN — CETIRIZINE HYDROCHLORIDE 5 MG: 10 TABLET ORAL at 08:29

## 2023-07-18 RX ADMIN — FAMOTIDINE 20 MG: 20 TABLET, FILM COATED ORAL at 06:47

## 2023-07-18 RX ADMIN — INSULIN GLARGINE-YFGN 12 UNITS: 100 INJECTION, SOLUTION SUBCUTANEOUS at 08:28

## 2023-07-18 RX ADMIN — BUMETANIDE 1 MG: 1 TABLET ORAL at 08:29

## 2023-07-18 RX ADMIN — HYDROCODONE BITARTRATE AND ACETAMINOPHEN 1 TABLET: 7.5; 325 TABLET ORAL at 09:16

## 2023-07-18 RX ADMIN — MULTIPLE VITAMINS W/ MINERALS TAB 1 TABLET: TAB at 08:29

## 2023-07-18 RX ADMIN — Medication 10 ML: at 08:30

## 2023-07-18 RX ADMIN — APIXABAN 2.5 MG: 2.5 TABLET, FILM COATED ORAL at 08:28

## 2023-07-18 RX ADMIN — HYDROCODONE BITARTRATE AND ACETAMINOPHEN 1 TABLET: 7.5; 325 TABLET ORAL at 01:44

## 2023-07-18 RX ADMIN — CARVEDILOL 3.12 MG: 3.12 TABLET, FILM COATED ORAL at 15:22

## 2023-07-18 RX ADMIN — SENNOSIDES AND DOCUSATE SODIUM 2 TABLET: 50; 8.6 TABLET ORAL at 08:29

## 2023-07-18 RX ADMIN — Medication 1 APPLICATION: at 08:30

## 2023-07-18 NOTE — DISCHARGE SUMMARY
[facility]    Date of Discharge:  7/18/2023    PCP: Provider, No Known    Discharge Diagnosis:   Active Hospital Problems    Diagnosis  POA    **Acute cholecystitis [K81.0]  Yes    Acute HFrEF (heart failure with reduced ejection fraction) [I50.21]  Yes    Diastolic CHF, chronic [I50.32]  Yes    Calculus of gallbladder and bile duct with acute cholecystitis, with obstruction [K80.63]  Unknown    Anemia [D64.9]  Yes    COPD (chronic obstructive pulmonary disease) [J44.9]  Yes    DM (diabetes mellitus) [E11.9]  Yes    ESRD (end stage renal disease) [N18.6]  Yes    History of colon cancer [Z85.038]  Yes    HTN (hypertension) [I10]  Yes    PAF (paroxysmal atrial fibrillation) [I48.0]  Yes    Peripheral neuropathy [G62.9]  Yes      Resolved Hospital Problems   No resolved problems to display.     Procedure(s):  ENDOSCOPIC RETROGRADE CHOLANGIOPANCREATOGRAPHY WITH SPHINCTEROTOMY AND BALLOON STONE SWEEP     Consults       Date and Time Order Name Status Description    7/11/2023  4:59 PM Inpatient Pulmonology Consult Completed     7/11/2023  7:51 AM Inpatient Gastroenterology Consult      7/10/2023  4:21 PM Inpatient Cardiology Consult Completed     7/10/2023  3:28 PM Inpatient Nephrology Consult Completed     7/10/2023 12:23 PM [facility] (on-call MD unless specified) Details      7/10/2023 12:22 PM Surgery (on-call MD unless specified) Completed     7/4/2023 10:24 PM Inpatient Vascular Surgery Consult Completed     7/4/2023 10:20 PM Inpatient Nephrology Consult Completed           Hospital Course  78 y.o. female initially admitted for abdominal pain which was secondary to acute cholecystitis/choledocholithiasis.  Patient was seen in consultation by surgery as well as GI.  Patient went to the OR on 7/12/2023 with  performed laparoscopic cholecystectomy with cholangiogram and then Dr. Hooker performed ERCP on 7/13/2023 where he also performed sphincterotomy with  balloon extraction.  She does have baseline anemia of chronic kidney disease but had further worsening of her blood counts due to acute blood loss postoperatively.  Hemoglobin is stabilized by discharge at a level of 10.4 upon last check.  Her LFTs have drifted down and she is tolerated dietary advancement.  Her leukocytosis is resolved and we did keep her on Zosyn though that has been discontinued postoperatively.  Pain is controlled with minimal use of Norco she is requiring just a couple doses daily.  Her anticoagulation was briefly held for the above procedures though resumed given her past history of PAF which is rate controlled.  Nephrology assisted with HD for ESRD.  She is euvolemic on discharge and no signs of volume overload.  Her blood sugars are much better controlled at this juncture as well and further adjustments can be made to her insulins based on dietary intake.  All questions have been answered to the patient have even discussed case along the way with family at bedside and have stayed in close contact with staff on a daily basis with multidisciplinary rounds and [facility] has done a great job trying to assist patient with SNF as she normally lives in [place] but she will be going to [facility] temporarily for subacute rehab.  All questions answered the patient she is thankful for the care she received while here as well as amenable to the above plan.    Temp:  [97.7 °F (36.5 °C)-98.4 °F (36.9 °C)] 97.7 °F (36.5 °C)  Heart Rate:  [70-73] 73  Resp:  [16-18] 16  BP: (137-176)/(56-60) 150/58  Body mass index is 23.15 kg/m².    Physical Exam  HENT:      Head: Normocephalic.      Nose: Nose normal.      Mouth/Throat:      Mouth: Mucous membranes are moist.      Pharynx: Oropharynx is clear.   Eyes:      General: No scleral icterus.     Conjunctiva/sclera: Conjunctivae normal.   Cardiovascular:      Rate and Rhythm: Normal rate and regular rhythm.   Pulmonary:      Effort: Pulmonary effort is normal. No  respiratory distress.      Breath sounds: Normal breath sounds.   Abdominal:      General: Bowel sounds are normal. There is no distension.      Palpations: Abdomen is soft.      Tenderness: There is no abdominal tenderness.      Comments: Laparoscopic scars CDI   Musculoskeletal:         General: No swelling.   Skin:     General: Skin is warm and dry.      Coloration: Skin is not jaundiced.   Neurological:      Mental Status: She is alert and oriented to person, place, and time. Mental status is at baseline.      Motor: Weakness present.   Psychiatric:         Mood and Affect: Mood normal.         Behavior: Behavior normal.         Thought Content: Thought content normal.         Judgment: Judgment normal.     Disposition: Skilled Nursing Facility (DC - External)       Discharge Medications        New Medications        Instructions Start Date   cadexomer iodine 0.9 % gel  Commonly known as: IODOSORB   1 application , Topical, Daily   Start Date: July 19, 2023     famotidine 20 MG tablet  Commonly known as: PEPCID   20 mg, Oral, 2 Times Daily Before Meals      HYDROcodone-acetaminophen 7.5-325 MG per tablet  Commonly known as: NORCO   1 tablet, Oral, Every 8 Hours PRN      insulin lispro 100 UNIT/ML injection  Commonly known as: HUMALOG/ADMELOG   2-7 Units, Subcutaneous, 4 Times Daily Before Meals & Nightly             Changes to Medications        Instructions Start Date   apixaban 2.5 MG tablet tablet  Commonly known as: ELIQUIS  What changed: when to take this   2.5 mg, Oral, Daily, Resume on Monday.      insulin glargine 100 UNIT/ML injection  Commonly known as: LANTUS, SEMGLEE  What changed: Another medication with the same name was removed. Continue taking this medication, and follow the directions you see here.   11 Units, Subcutaneous, Daily      pravastatin 40 MG tablet  Commonly known as: PRAVACHOL  What changed: when to take this   40 mg, Oral, Daily             Continue These Medications         Instructions Start Date   b complex-C-E-zinc tablet   1 tablet, Oral, Daily      bumetanide 1 MG tablet  Commonly known as: BUMEX   1 mg, Oral, Daily      carvedilol 6.25 MG tablet  Commonly known as: COREG   6.25 mg, Oral, 2 Times Daily With Meals      cetirizine 10 MG tablet  Commonly known as: zyrTEC   10 mg, Oral, Daily      ferrous sulfate 325 (65 FE) MG tablet   325 mg, Oral, Every Other Day      Vitamin D3 50 MCG (2000 UT) capsule   2,000 Units, Oral, Daily             Stop These Medications      albuterol sulfate  (90 Base) MCG/ACT inhaler  Commonly known as: PROVENTIL HFA;VENTOLIN HFA;PROAIR HFA     Biotin 61898 MCG tablet     Insulin Aspart 100 UNIT/ML injection  Commonly known as: novoLOG     omeprazole 40 MG capsule  Commonly known as: priLOSEC     senna 8.6 MG tablet  Commonly known as: SENOKOT     VELTASSA PO               Additional Instructions for the Follow-ups that You Need to Schedule       Discharge Follow-up with PCP   As directed       Currently Documented PCP:    Provider, No Known    PCP Phone Number:    [phone]     Follow Up Details: PCP post rehab.  Surgery renal and any additional consults per the recommendations                Contact information for follow-up providers       Rolando Ivey MD Follow up in 2 week(s).    Specialty: General Surgery  Contact information:  [address] [facility] [address]  [place] [id number] [phone]               Provider, No Known .    Why: PCP post rehab.  Surgery renal and any additional consults per the recommendations  Contact information:  [facility] [id number] [phone]               Edward Mcdonnell MD .    Specialty: Family Medicine  Why: PCP post rehab.  Surgery renal and any additional consults per the recommendations  Contact information:  [address] [facility] [address]  [place] [id number] [phone]                       Contact information for after-discharge care       Destination       [facility] .    Service: Skilled Nursing  Contact information:  [address] [facility] [address] [phone]                                No future appointments.     Rickie Bruce MD  [facility]  07/18/23    Discharge time spent greater than 30 minutes.

## 2023-07-18 NOTE — NURSING NOTE
Pre Dialysis V/S 149/70 , 70 , 16 , 96.9 , and pre wt - 52.2 kg. Dialysis was completed. Patient had tolerated hd well. Post V/S 177/70 , 74 , 16 , 97.4 , and post wt - 51.2 kg.

## 2023-07-18 NOTE — PLAN OF CARE
Goal Outcome Evaluation:           Progress: improving  Outcome Evaluation: A&Ox4. RA. HD today, 1L removed. Discharge to [facility], transport at 1700. No other complaints this shift. VSS.

## 2023-07-18 NOTE — NURSING NOTE
Pre Dialysis V/S 104/34 , 75 , 18 , 97.1 , and pre wt - 91.5 kg. Dialysis was completed. No UF removal per patient request due to feeling sick with previous treatments. Patient was informed of importance of uf removal.  Patient had verbalized understanding. Md notified. Post V/S 117/32 , 75 , 16 , 97.4 , and post wt- 91.8 kg.

## 2023-07-18 NOTE — CASE MANAGEMENT/SOCIAL WORK
Continued Stay Note  [facility]     Patient Name: Kalyn Mejia  MRN: [id number]  Today's Date: 7/18/2023    Admit Date: 7/10/2023    Plan: [facility] via [facility]   Discharge Plan       Row Name 07/18/23 1214       Plan    Plan [facility] via [facility]    Patient/Family in Agreement with Plan yes    Plan Comments Per [facility] has skilled bed available and HD chair available.  Spoke with patient at bedside and she is agreeable.  Spoke with sister Taisha by telephone and she is agreeable.  Ambulance disclaimer given.  [facility] scheduled for 5:00 p.m. Packet to RN.  Taisha also states they found a facility in [place] for rehab and would like referral.  Call to [facility] [phone] and spoke with Chava.  They do not have HD chair available.  They will follow patient in rehab here and anticipate transfer to [place] when HD chair available and patient is stable for transfer/car trip.  Information faxed to [phone].  Merry SLADE                    Expected Discharge Date and Time       Expected Discharge Date Expected Discharge Time    Jul 18, 2023               Becky S. [place], RN

## 2023-07-18 NOTE — PROGRESS NOTES
"Enter Query Response Below      Query Response: Chronic diastolic heart failure only, Acute HFrEF not clinically supported              If applicable, please update the problem list.       Patient: Kalyn Mejia        : 1945  Account: [id number]           Admit Date:         How to Respond to this query:       a. Click New Note     b. Answer query within the yellow box.                c. Update the Problem List, if applicable.      If you have any questions about this query contact me at: [email or web address]         78 year old woman with PMH of ESRD, diastolic CHF, COPD, DM1, pulmonary hypertension, admitted 7/10 with acute cholecystitis/choledocholithiasis.   DC Summary notes an active hospital problem of: \"Acute HFrEF.\"  A diagnosis of chronic diastolic CHF is noted from the H&P through to the DC Summary.  Echo  notes:  calculated LVEF 73%, LV systolic function is hyperdynamic, LV diastolic function indeterminate.  CXR  showed: \"Pulmonary vasculature is less congested... No pleural effusion.\"  Patient noted to be euvolemic on Pulmonology note  and Hospitalist notes , , 7/15 and DC Summary.  Treatment includes dialysis, po Bumex 1mg x5 days.     Please document if after study, the CHF is determined to be:  - Chronic diastolic heart failure only, Acute HFrEF not clinically supported  - Acute HFrEF supported with the following additional clinical indicators: ____________  - Other, please specify  - Unable to determine          By submitting this query, we are merely seeking further clarification of documentation to accurately reflect all conditions that you are monitoring, evaluating, treating or that extend the hospitalization or utilize additional resources of care. Please utilize your independent clinical judgment when addressing the question(s) above.     This query and your response, once completed, will be entered into the legal medical record.    Sincerely, "   Rose Marie Armenta RN, BSN  [email or web address]  Clinical Documentation Integrity Program

## 2023-07-18 NOTE — PROGRESS NOTES
LOS: 8 days     Chief Complaint/ Reason for encounter: ESRD, dialysis management    Subjective   07/12/23 : Patient is doing well today with no new complaints  N.p.o. for surgery  No shortness of breath chest pain or edema  Voiding well with no dysuria  Admitted yesterday with abdominal pain nausea and vomiting and acute cholecystitis    7/13: Complaining of some postop pain, status post lap katelyn and now needs ERCP    7/14: She had dialysis yesterday morning, tolerated well then ERCP with findings of multiple biliary stones status post sphincterotomy and stone extraction    7/15: Seen on dialysis, tolerating treatment well.  She looks and feels much better and states her appetite is returning  No UF today, 3K bath.  Blood flow rate 350, dialysate flow rate 500.  Arterial and venous pressure around 140.  Discussed orders with dialysis RN, Uma Wheeler    7/16 doing well, sitting up in bed putting on make up     7/17: She looks and feels better denies complaints  Eating and drinking better.  Looking into rehab in [place] at discharge    7/18: She looks and feels well denies new complaints  Eating better no nausea, no abdominal pain  No shortness of breath or edema    Medical history reviewed:  History of Present Illness    Subjective    History taken from: Patient and chart    Vital Signs  Temp:  [97.7 °F (36.5 °C)-98.4 °F (36.9 °C)] 97.7 °F (36.5 °C)  Heart Rate:  [70-73] 73  Resp:  [16-18] 16  BP: (137-176)/(56-60) 150/58       Wt Readings from Last 1 Encounters:   07/18/23 0441 57.4 kg (126 lb 8.7 oz)   07/17/23 0636 53.8 kg (118 lb 11.2 oz)   07/16/23 0500 56 kg (123 lb 7.3 oz)   07/15/23 0304 56.3 kg (124 lb 1.9 oz)   07/14/23 0605 57.5 kg (126 lb 12.2 oz)   07/13/23 1655 56.2 kg (124 lb)   07/13/23 1100 54.9 kg (121 lb 0.5 oz)   07/13/23 0500 56.3 kg (124 lb 1.9 oz)   07/12/23 0753 59.9 kg (132 lb)   07/12/23 0500 60 kg (132 lb 4.4 oz)   07/10/23 1007 55.3 kg (122 lb)       Objective:  Vital signs: (most  "recent): Blood pressure 150/58, pulse 73, temperature 97.7 °F (36.5 °C), temperature source Oral, resp. rate 16, height 157.5 cm (62\"), weight 57.4 kg (126 lb 8.7 oz), SpO2 99 %.              Objective:  General Appearance:  Comfortable, well-appearing, in no acute distress and not in pain.  Awake, alert, oriented  HEENT: Mucous membranes moist, no injury, oropharynx clear  Lungs:  Normal effort and normal respiratory rate.  Breath sounds clear to auscultation.  No  respiratory distress.  No rales, decreased breath sounds or rhonchi.    Heart: Normal rate.  Regular rhythm.  S1, S2 normal.  No murmur.   Abdomen: Abdomen is soft.  Bowel sounds are normal, no abdominal tenderness.  There is no rebound or guarding  Extremities: No edema of bilateral lower extremities  Neurological: No focal motor or sensory deficits, pupils reactive  Skin:  Warm and dry.  No rash or cyanosis.   Right lower arm avf  +thrill       Results Review:    Intake/Output:     Intake/Output Summary (Last 24 hours) at 7/18/2023 0815  Last data filed at 7/18/2023 0647  Gross per 24 hour   Intake 480 ml   Output --   Net 480 ml           DATA:  Radiology and Labs:  The following labs independently reviewed by me. Additional labs ordered for tomorrow a.m.  Interval notes, chart personally reviewed by me.   Discussed with patient/family    Risk/ complexity of medical care/ medical decision making moderate risk, postop lap katelyn    Labs:   Recent Results (from the past 24 hour(s))   POC Glucose Once    Collection Time: 07/17/23 11:21 AM    Specimen: Blood   Result Value Ref Range    Glucose 332 (H) 70 - 130 mg/dL   Hepatitis B Core Antibody, Total    Collection Time: 07/17/23  4:05 PM    Specimen: Blood   Result Value Ref Range    Hep B Core Total Ab Negative Negative   POC Glucose Once    Collection Time: 07/17/23  4:12 PM    Specimen: Blood   Result Value Ref Range    Glucose 597 (C) 70 - 130 mg/dL   POC Glucose Once    Collection Time: 07/17/23  4:15 " PM    Specimen: Blood   Result Value Ref Range    Glucose 295 (H) 70 - 130 mg/dL   POC Glucose Once    Collection Time: 07/17/23  8:51 PM    Specimen: Blood   Result Value Ref Range    Glucose 234 (H) 70 - 130 mg/dL   POC Glucose Once    Collection Time: 07/18/23  6:09 AM    Specimen: Blood   Result Value Ref Range    Glucose 147 (H) 70 - 130 mg/dL   Comprehensive Metabolic Panel    Collection Time: 07/18/23  6:28 AM    Specimen: Blood   Result Value Ref Range    Glucose 126 (H) 65 - 99 mg/dL    BUN 36 (H) 8 - 23 mg/dL    Creatinine 4.88 (H) 0.57 - 1.00 mg/dL    Sodium 133 (L) 136 - 145 mmol/L    Potassium 5.0 3.5 - 5.2 mmol/L    Chloride 98 98 - 107 mmol/L    CO2 25.0 22.0 - 29.0 mmol/L    Calcium 10.1 8.6 - 10.5 mg/dL    Total Protein 5.4 (L) 6.0 - 8.5 g/dL    Albumin 2.7 (L) 3.5 - 5.2 g/dL    ALT (SGPT) 44 (H) 1 - 33 U/L    AST (SGOT) 15 1 - 32 U/L    Alkaline Phosphatase 796 (H) 39 - 117 U/L    Total Bilirubin 1.0 0.0 - 1.2 mg/dL    Globulin 2.7 gm/dL    A/G Ratio 1.0 g/dL    BUN/Creatinine Ratio 7.4 7.0 - 25.0    Anion Gap 10.0 5.0 - 15.0 mmol/L    eGFR 8.6 (L) >60.0 mL/min/1.73       Radiology:  Pertinent radiology studies were reviewed as described above      Medications have been reviewed separately in chart overview      ASSESSMENT:  ESRD, will keep on a TTS dialysis schedule this admission.  Volume electrolytes stable  Cholecystitis, status post lap katelyn  Anemia of CKD  Hyperphosphatemia   HTN  Elevated transaminase levels due to acute cholecystitis, improved  Metabolic acidosis, stable  Choledocholithiasis status post ERCP/sphincterotomy/stone extraction      Plan:  Awaiting HD this a.m. and continue Tuesday Thursday Saturday schedule for now  Volume and electrolytes remain at goal  Check chemistries on dialysis days  Decreased frequency of sodium bicarb supplements   Epogen with HD for anemia  BP a little better after resuming Coreg and Bumex, continue both at current dose for now  Continue vitamin  D  Stable for discharge to rehab at any time from a renal standpoint after dialysis today once a bed is available        Marv Carroll MD   [facility]   Office phone number: [phone]  Answering service phone number: [phone]    07/18/23  08:15 EDT

## 2023-07-31 NOTE — ANESTHESIA POSTPROCEDURE EVALUATION
Patient: Kalyn Mejia    Procedure Summary       Date: 07/13/23 Room / [facility] / [facility]    Anesthesia Start: 1841 Anesthesia Stop: 1917    Procedure: ENDOSCOPIC RETROGRADE CHOLANGIOPANCREATOGRAPHY WITH SPHINCTEROTOMY AND BALLOON STONE SWEEP Diagnosis:       Calculus of gallbladder and bile duct with acute cholecystitis, with obstruction      (Calculus of gallbladder and bile duct with acute cholecystitis, with obstruction [K80.63])    Surgeons: Pro Hooker MD Provider: Dudley Dillon MD    Anesthesia Type: MAC ASA Status: 3            Anesthesia Type: MAC    Vitals  Vitals Value Taken Time   /52 07/13/23 1935   Temp     Pulse 63 07/13/23 1935   Resp 16 07/13/23 1935   SpO2 98 % 07/13/23 1935           Post Anesthesia Care and Evaluation      Comments: I was not called for signout in this patient and was not present in the hospital when they were signed out. I did not evaluate the patient postoperatively, nor was I requested to do so.

## 2023-08-17 ENCOUNTER — HOSPITAL ENCOUNTER (OUTPATIENT)
Dept: GENERAL RADIOLOGY | Facility: HOSPITAL | Age: 78
Discharge: HOME | End: 2023-08-17
Payer: MEDICARE

## 2023-08-17 ENCOUNTER — OFFICE VISIT (OUTPATIENT)
Dept: SURGERY | Facility: CLINIC | Age: 78
End: 2023-08-17
Payer: MEDICARE

## 2023-08-17 ENCOUNTER — LAB (OUTPATIENT)
Dept: LAB | Facility: HOSPITAL | Age: 78
End: 2023-08-17
Payer: MEDICARE

## 2023-08-17 VITALS
WEIGHT: 126.54 LBS | HEIGHT: 62 IN | DIASTOLIC BLOOD PRESSURE: 60 MMHG | SYSTOLIC BLOOD PRESSURE: 128 MMHG | BODY MASS INDEX: 23.29 KG/M2

## 2023-08-17 DIAGNOSIS — K81.0 ACUTE CHOLECYSTITIS: Primary | ICD-10-CM

## 2023-08-17 DIAGNOSIS — K81.0 ACUTE CHOLECYSTITIS: ICD-10-CM

## 2023-08-17 LAB
ALBUMIN SERPL-MCNC: 3.7 G/DL (ref 3.5–5.2)
ALBUMIN/GLOB SERPL: 1.3 G/DL
ALP SERPL-CCNC: 218 U/L (ref 39–117)
ALT SERPL W P-5'-P-CCNC: 24 U/L (ref 1–33)
AMORPH URATE CRY URNS QL MICRO: ABNORMAL /HPF
ANION GAP SERPL CALCULATED.3IONS-SCNC: 15.2 MMOL/L (ref 5–15)
AST SERPL-CCNC: 20 U/L (ref 1–32)
BACTERIA UR QL AUTO: ABNORMAL /HPF
BASOPHILS # BLD AUTO: 0.09 10*3/MM3 (ref 0–0.2)
BASOPHILS NFR BLD AUTO: 1 % (ref 0–1.5)
BILIRUB SERPL-MCNC: 0.5 MG/DL (ref 0–1.2)
BILIRUB UR QL STRIP: NEGATIVE
BUN SERPL-MCNC: 28 MG/DL (ref 8–23)
BUN/CREAT SERPL: 8.5 (ref 7–25)
CALCIUM SPEC-SCNC: 10.4 MG/DL (ref 8.6–10.5)
CHLORIDE SERPL-SCNC: 94 MMOL/L (ref 98–107)
CLARITY UR: ABNORMAL
CO2 SERPL-SCNC: 24.8 MMOL/L (ref 22–29)
COLOR UR: YELLOW
CREAT SERPL-MCNC: 3.29 MG/DL (ref 0.57–1)
DEPRECATED RDW RBC AUTO: 50.4 FL (ref 37–54)
EGFRCR SERPLBLD CKD-EPI 2021: 13.8 ML/MIN/1.73
EOSINOPHIL # BLD AUTO: 0.23 10*3/MM3 (ref 0–0.4)
EOSINOPHIL NFR BLD AUTO: 2.6 % (ref 0.3–6.2)
ERYTHROCYTE [DISTWIDTH] IN BLOOD BY AUTOMATED COUNT: 15.1 % (ref 12.3–15.4)
GLOBULIN UR ELPH-MCNC: 2.8 GM/DL
GLUCOSE SERPL-MCNC: 84 MG/DL (ref 65–99)
GLUCOSE UR STRIP-MCNC: NEGATIVE MG/DL
HCT VFR BLD AUTO: 29.4 % (ref 34–46.6)
HGB BLD-MCNC: 9.4 G/DL (ref 12–15.9)
HGB UR QL STRIP.AUTO: ABNORMAL
HYALINE CASTS UR QL AUTO: ABNORMAL /LPF
IMM GRANULOCYTES # BLD AUTO: 0.06 10*3/MM3 (ref 0–0.05)
IMM GRANULOCYTES NFR BLD AUTO: 0.7 % (ref 0–0.5)
KETONES UR QL STRIP: ABNORMAL
LEUKOCYTE ESTERASE UR QL STRIP.AUTO: ABNORMAL
LYMPHOCYTES # BLD AUTO: 0.87 10*3/MM3 (ref 0.7–3.1)
LYMPHOCYTES NFR BLD AUTO: 9.8 % (ref 19.6–45.3)
MCH RBC QN AUTO: 29.2 PG (ref 26.6–33)
MCHC RBC AUTO-ENTMCNC: 32 G/DL (ref 31.5–35.7)
MCV RBC AUTO: 91.3 FL (ref 79–97)
MONOCYTES # BLD AUTO: 1.11 10*3/MM3 (ref 0.1–0.9)
MONOCYTES NFR BLD AUTO: 12.4 % (ref 5–12)
NEUTROPHILS NFR BLD AUTO: 6.56 10*3/MM3 (ref 1.7–7)
NEUTROPHILS NFR BLD AUTO: 73.5 % (ref 42.7–76)
NITRITE UR QL STRIP: NEGATIVE
NRBC BLD AUTO-RTO: 0.1 /100 WBC (ref 0–0.2)
PH UR STRIP.AUTO: 6.5 [PH] (ref 5–8)
PLATELET # BLD AUTO: 190 10*3/MM3 (ref 140–450)
PMV BLD AUTO: 10.3 FL (ref 6–12)
POTASSIUM SERPL-SCNC: 4.4 MMOL/L (ref 3.5–5.2)
PROT SERPL-MCNC: 6.5 G/DL (ref 6–8.5)
PROT UR QL STRIP: ABNORMAL
RBC # BLD AUTO: 3.22 10*6/MM3 (ref 3.77–5.28)
RBC # UR STRIP: ABNORMAL /HPF
REF LAB TEST METHOD: ABNORMAL
SODIUM SERPL-SCNC: 134 MMOL/L (ref 136–145)
SP GR UR STRIP: 1.01 (ref 1–1.03)
SQUAMOUS #/AREA URNS HPF: ABNORMAL /HPF
UROBILINOGEN UR QL STRIP: ABNORMAL
WBC # UR STRIP: ABNORMAL /HPF
WBC NRBC COR # BLD: 8.92 10*3/MM3 (ref 3.4–10.8)

## 2023-08-17 PROCEDURE — 1160F RVW MEDS BY RX/DR IN RCRD: CPT | Performed by: SURGERY

## 2023-08-17 PROCEDURE — 74018 RADEX ABDOMEN 1 VIEW: CPT

## 2023-08-17 PROCEDURE — 3074F SYST BP LT 130 MM HG: CPT | Performed by: SURGERY

## 2023-08-17 PROCEDURE — 36415 COLL VENOUS BLD VENIPUNCTURE: CPT

## 2023-08-17 PROCEDURE — 80053 COMPREHEN METABOLIC PANEL: CPT

## 2023-08-17 PROCEDURE — 1159F MED LIST DOCD IN RCRD: CPT | Performed by: SURGERY

## 2023-08-17 PROCEDURE — 99024 POSTOP FOLLOW-UP VISIT: CPT | Performed by: SURGERY

## 2023-08-17 PROCEDURE — 3078F DIAST BP <80 MM HG: CPT | Performed by: SURGERY

## 2023-08-17 PROCEDURE — 85025 COMPLETE CBC W/AUTO DIFF WBC: CPT

## 2023-08-17 PROCEDURE — 81001 URINALYSIS AUTO W/SCOPE: CPT

## 2023-08-17 RX ORDER — CALCIUM CARBONATE/VITAMIN D3 500-10/5ML
2 LIQUID (ML) ORAL
COMMUNITY
Start: 2015-07-30

## 2023-08-17 NOTE — PROGRESS NOTES
ASSESSMENT/PLAN:    Ms. Mejia is a 78-year-old lady status post laparoscopic cholecystectomy with cholangiogram on 7/12/2023.  She underwent ERCP with sphincterotomy on 7/13/23.  Recent symptoms of foul-smelling diarrhea have abated prior to the C. difficile test being collected.  Recommend C. difficile testing if recurrent symptoms occur.  Given her ongoing pain and mild encephalopathy, I have ordered a CBC, CMP, UA, and KUB.  I will call her and Dr. Lopez once these have resulted.      CC:     Chief Complaint   Patient presents with    Post-op Gallbladder        HPI:    78-year-old lady status post laparoscopic cholecystectomy with cholangiogram on 7/12/2023 followed by ERCP for choledocholithiasis on 7/13/2023.  She was maintained on Zosyn in the hospital.  Since discharge she has had ongoing upper abdominal pain across her abdomen.  She also had episode of foul-smelling diarrhea within the last week which resolved prior to a specimen being tested for C. difficile.  She does report a prior C. difficile infection approximately a year and a half ago.  Dr. Clive Lopez is with her today and he reports that she is demonstrating some mild confusion and word finding difficulty which is out of character for her.  For instance, in the office today she struggles to correctly pronounce the word diarrhea initially.  After several attempts that she was able to say it correctly.  She denies any fevers.  She does report that she regularly makes urine and denies any dysuria at present.  She has an ulcer overlying the left plantar surface of her foot which was debrided by Dr. Rosario on 7/5/2023.  She reports this has been slow to heal.    ENDOSCOPY:   ERCP on 7/13/2023 with common bile duct stone    RADIOLOGY:   KUB ordered for today    SOCIAL HISTORY:   Social Determinants of Health     Tobacco Use: Medium Risk    Smoking Tobacco Use: Former    Smokeless Tobacco Use: Former    Passive Exposure: Not on file   Alcohol Use:  Not At Risk    Frequency of Alcohol Consumption: 2-4 times a month    Average Number of Drinks: 1 or 2    Frequency of Binge Drinking: Never   Financial Resource Strain: Not on file   Food Insecurity: No Food Insecurity    Worried About Running Out of Food in the Last Year: Never true    Ran Out of Food in the Last Year: Never true   Transportation Needs: Not on file   Physical Activity: Not on file   Stress: Not on file   Social Connections: Not on file   Intimate Partner Violence: Not on file   Depression: Not on file   Housing Stability: Not on file        FAMILY HISTORY:    Family History   Problem Relation Age of Onset    Malig Hyperthermia Neg Hx         OTHER SURGERY:  Past Surgical History:   Procedure Laterality Date    CHOLECYSTECTOMY N/A 7/12/2023    Procedure: CHOLECYSTECTOMY LAPAROSCOPIC WITH DAVINCI ROBOT with cholangiogram;  Surgeon: Rolando Ivey MD;  Location: [facility];  Service: Robotics - DaVinci;  Laterality: N/A;    ERCP N/A 7/13/2023    Procedure: ENDOSCOPIC RETROGRADE CHOLANGIOPANCREATOGRAPHY WITH SPHINCTEROTOMY AND BALLOON STONE SWEEP;  Surgeon: Pro Hooker MD;  Location: [facility] ENDOSCOPY;  Service: Gastroenterology;  Laterality: N/A;  COMMON BILE DUCT STONE    INCISION AND DRAINAGE LEG Left 7/5/2023    Procedure: LEFT INCISION AND DRAINAGE FOOT;  Surgeon: Justice Rosario MD;  Location: [facility];  Service: Vascular;  Laterality: Left;        PAST MEDICAL HISTORY:    Past Medical History:   Diagnosis Date    Cancer     CHF (congestive heart failure)     Diabetes mellitus     DVT (deep venous thrombosis)     Gastroparesis     GERD (gastroesophageal reflux disease)     Hyperlipidemia     Hypertension     Kidney transplant failure     Neuropathy     Osteoporosis     Pulmonary nodules     Renal failure     Rheumatoid arthritis     Spinal stenosis     Stroke         MEDICATIONS:   Current Outpatient Medications on File Prior to Visit   Medication Sig Dispense Refill     apixaban (ELIQUIS) 2.5 MG tablet tablet Take 1 tablet by mouth Daily. Resume on Monday. (Patient taking differently: Take 1 tablet by mouth Every Night. Resume on Monday.) 60 tablet     bumetanide (BUMEX) 1 MG tablet Take 1 tablet by mouth Daily.      cadexomer iodine (IODOSORB) 0.9 % gel Apply 1 application  topically to the appropriate area as directed Daily.      carvedilol (COREG) 6.25 MG tablet Take 1 tablet by mouth 2 (Two) Times a Day With Meals.      cetirizine (zyrTEC) 10 MG tablet Take 1 tablet by mouth Daily.      Cholecalciferol (Vitamin D3) 50 MCG (2000 UT) capsule Take 1 capsule by mouth Daily.      famotidine (PEPCID) 20 MG tablet Take 1 tablet by mouth 2 (Two) Times a Day Before Meals.      ferrous sulfate 325 (65 FE) MG tablet Take 1 tablet by mouth Every Other Day.      insulin glargine (LANTUS, SEMGLEE) 100 UNIT/ML injection Inject 11 Units under the skin into the appropriate area as directed Daily.      insulin lispro (HUMALOG/ADMELOG) 100 UNIT/ML injection Inject 2-7 Units under the skin into the appropriate area as directed 4 (Four) Times a Day Before Meals & at Bedtime.  12    Magnesium Oxide 400 MG capsule Take 2 tablets by mouth.      Methoxy PEG-Epoetin Beta (MIRCERA IJ) 50 mcg Every 14 (Fourteen) Days.      Multiple Vitamins-Minerals (b complex-C-E-zinc) tablet Take 1 tablet by mouth Daily.      pravastatin (PRAVACHOL) 40 MG tablet Take 1 tablet by mouth daily. (Patient taking differently: Take 1 tablet by mouth Every Night.) 30 tablet 3     No current facility-administered medications on file prior to visit.        ALLERGIES:   Allergies   Allergen Reactions    Ativan [Lorazepam] Unknown - High Severity    Sulfa Antibiotics Nausea And Vomiting    Vancomycin Itching        PHYSICAL EXAM:   Constitutional: Well-developed well-nourished, no acute distress  Neck: Supple, no palpable mass, trachea midline  Respiratory: Symmetric Excursion, normal inspiratory effort  Cardiovascular: Well  perfused, no visible jugular venous distention  Gastrointestinal: Soft, incisions well approximated, some mild tenderness in the upper abdomen but no concerning signs on examination  Foot: Plantar surface of left foot with clean-based ulcer notes approximately 1.5 to 2 cm in diameter, no expressible purulence from this    Peter Austin M.D.  General and Endoscopic Surgery  [facility]    [address], [address]  [place], [place], [address]  P: [phone]  F: [phone]

## 2025-01-29 NOTE — PLAN OF CARE
Goal Outcome Evaluation:      New admit. Pt A/O x4, on 2L NC, NSR on monitor. Dressing to left foot changed per wound care nurse recommendations. Surgery, nephrology, and cardiology consulted. Cardiac clearance obtained. Plans for tentative surgery on Wednesday. Clear liquid diet. Pain meds as needed. VSS.                    97.9

## (undated) DEVICE — SUT MNCRYL PLS ANTIB UD 4/0 PS2 18IN

## (undated) DEVICE — COLUMN DRAPE

## (undated) DEVICE — TISSUE RETRIEVAL SYSTEM: Brand: INZII RETRIEVAL SYSTEM

## (undated) DEVICE — DRSNG WND BORDR/ADHS NONADHR/GZ LF 2X2IN STRL

## (undated) DEVICE — GOWN,NON-REINFORCED,SIRUS,SET IN SLV,XL: Brand: MEDLINE

## (undated) DEVICE — GLV SURG BIOGEL LTX PF 8 1/2

## (undated) DEVICE — EXTENSION SET, MALE LUER LOCK ADAPTER WITH RETRACTABLE COLLAR

## (undated) DEVICE — UNDYED BRAIDED (POLYGLACTIN 910), SYNTHETIC ABSORBABLE SUTURE: Brand: COATED VICRYL

## (undated) DEVICE — TUBING, SUCTION, 1/4" X 10', STRAIGHT: Brand: MEDLINE

## (undated) DEVICE — STRIP,CLOSURE,WOUND,MEDI-STRIP,1/2X4: Brand: MEDLINE

## (undated) DEVICE — SENSR O2 OXIMAX FNGR A/ 18IN NONSTR

## (undated) DEVICE — SYR LUERLOK 30CC

## (undated) DEVICE — BITEBLOCK OMNI BLOC

## (undated) DEVICE — SPHINCTEROTOME: Brand: HYDRATOME RX 44

## (undated) DEVICE — BLADELESS OBTURATOR: Brand: WECK VISTA

## (undated) DEVICE — CATH IV INSYTE AUTOGARD 14G 1 1/2IN ORNG

## (undated) DEVICE — KT ORCA ORCAPOD DISP STRL

## (undated) DEVICE — DRP C/ARM 41X74IN

## (undated) DEVICE — SINGLE USE DISTAL COVER MAJ-2315: Brand: SINGLE USE DISTAL COVER

## (undated) DEVICE — LOU LAP CHOLE: Brand: MEDLINE INDUSTRIES, INC.

## (undated) DEVICE — LAPAROSCOPIC SMOKE FILTRATION SYSTEM: Brand: PALL LAPAROSHIELD® PLUS LAPAROSCOPIC SMOKE FILTRATION SYSTEM

## (undated) DEVICE — RETRIEVAL BALLOON CATHETER: Brand: EXTRACTOR™ PRO RX

## (undated) DEVICE — ARM DRAPE

## (undated) DEVICE — SEAL

## (undated) DEVICE — CANN O2 ETCO2 FITS ALL CONN CO2 SMPL A/ 7IN DISP LF

## (undated) DEVICE — ADAPT CLN BIOGUARD AIR/H2O DISP

## (undated) DEVICE — 3M™ STERI-STRIP™ COMPOUND BENZOIN TINCTURE 40 BAGS/CARTON 4 CARTONS/CASE C1544: Brand: 3M™ STERI-STRIP™

## (undated) DEVICE — APPL CHLORAPREP HI/LITE 26ML ORNG

## (undated) DEVICE — SOL ANTISTICK CAUTRY ELECTROLUBE LF

## (undated) DEVICE — DEV LK WIREGUIDE FUSN OLYMP SCP

## (undated) DEVICE — ERBE NESSY®PLATE 170 SPLIT; 168CM²; CABLE 3M: Brand: ERBE

## (undated) DEVICE — LN SMPL CO2 SHTRM SD STREAM W/M LUER

## (undated) DEVICE — Device

## (undated) DEVICE — CATH URETRL SOF/FLEX OPN/END 4F 70CM

## (undated) DEVICE — STPCK 3WY D201 DISCOFIX

## (undated) DEVICE — LAPAROVUE VISIBILITY SYSTEM LAPAROSCOPIC SOLUTIONS: Brand: LAPAROVUE

## (undated) DEVICE — THE STERILE LIGHT HANDLE COVER IS USED WITH STERIS SURGICAL LIGHTING AND VISUALIZATION SYSTEMS.

## (undated) DEVICE — PATIENT RETURN ELECTRODE, SINGLE-USE, CONTACT QUALITY MONITORING, ADULT, WITH 9FT CORD, FOR PATIENTS WEIGING OVER 33LBS. (15KG): Brand: MEGADYNE

## (undated) DEVICE — DRAPE,REIN 53X77,STERILE: Brand: MEDLINE